# Patient Record
Sex: FEMALE | Race: WHITE | HISPANIC OR LATINO | Employment: FULL TIME | URBAN - METROPOLITAN AREA
[De-identification: names, ages, dates, MRNs, and addresses within clinical notes are randomized per-mention and may not be internally consistent; named-entity substitution may affect disease eponyms.]

---

## 2017-02-01 ENCOUNTER — OFFICE VISIT (OUTPATIENT)
Dept: URGENT CARE | Facility: CLINIC | Age: 57
End: 2017-02-01
Payer: COMMERCIAL

## 2017-02-01 VITALS
DIASTOLIC BLOOD PRESSURE: 80 MMHG | HEART RATE: 76 BPM | HEIGHT: 65 IN | OXYGEN SATURATION: 99 % | SYSTOLIC BLOOD PRESSURE: 120 MMHG | WEIGHT: 227.5 LBS | TEMPERATURE: 98 F | BODY MASS INDEX: 37.9 KG/M2 | RESPIRATION RATE: 18 BRPM

## 2017-02-01 DIAGNOSIS — R09.81 NASAL CONGESTION: ICD-10-CM

## 2017-02-01 DIAGNOSIS — J01.00 ACUTE NON-RECURRENT MAXILLARY SINUSITIS: Primary | ICD-10-CM

## 2017-02-01 PROCEDURE — 99999 PR PBB SHADOW E&M-EST. PATIENT-LVL III: CPT | Mod: PBBFAC,,, | Performed by: NURSE PRACTITIONER

## 2017-02-01 PROCEDURE — 99214 OFFICE O/P EST MOD 30 MIN: CPT | Mod: 25,S$GLB,, | Performed by: NURSE PRACTITIONER

## 2017-02-01 PROCEDURE — 96372 THER/PROPH/DIAG INJ SC/IM: CPT | Mod: S$GLB,,, | Performed by: NURSE PRACTITIONER

## 2017-02-01 RX ORDER — AMOXICILLIN 875 MG/1
875 TABLET, FILM COATED ORAL 2 TIMES DAILY
Qty: 20 TABLET | Refills: 0 | Status: SHIPPED | OUTPATIENT
Start: 2017-02-01 | End: 2017-02-11

## 2017-02-01 RX ORDER — LORATADINE 10 MG/1
10 TABLET ORAL DAILY
Qty: 30 TABLET | Refills: 0 | Status: SHIPPED | OUTPATIENT
Start: 2017-02-01 | End: 2017-04-18 | Stop reason: SDUPTHER

## 2017-02-01 RX ORDER — BETAMETHASONE SODIUM PHOSPHATE AND BETAMETHASONE ACETATE 3; 3 MG/ML; MG/ML
3 INJECTION, SUSPENSION INTRA-ARTICULAR; INTRALESIONAL; INTRAMUSCULAR; SOFT TISSUE
Status: COMPLETED | OUTPATIENT
Start: 2017-02-01 | End: 2017-02-01

## 2017-02-01 RX ADMIN — BETAMETHASONE SODIUM PHOSPHATE AND BETAMETHASONE ACETATE 3 MG: 3; 3 INJECTION, SUSPENSION INTRA-ARTICULAR; INTRALESIONAL; INTRAMUSCULAR; SOFT TISSUE at 05:02

## 2017-02-01 NOTE — PATIENT INSTRUCTIONS
Sinusitis (Antibiotic Treatment)    The sinuses are air-filled spaces within the bones of the face. They connect to the inside of the nose. Sinusitis is an inflammation of the tissue lining the sinus cavity. Sinus inflammation can occur during a cold. It can also be due to allergies to pollens and other particles in the air. Sinusitis can cause symptoms of sinus congestion and fullness. A sinus infection causes fever, headache and facial pain. There is often green or yellow drainage from the nose or into the back of the throat (post-nasal drip). You have been given antibiotics to treat this condition.  Home care:  · Take the full course of antibiotics as instructed. Do not stop taking them, even if you feel better.  · Drink plenty of water, hot tea, and other liquids. This may help thin mucus. It also may promote sinus drainage.  · Heat may help soothe painful areas of the face. Use a towel soaked in hot water. Or,  the shower and direct the hot spray onto your face. Using a vaporizer along with a menthol rub at night may also help.   · An expectorant containing guaifenesin may help thin the mucus and promote drainage from the sinuses.  · Over-the-counter decongestants may be used unless a similar medicine was prescribed. Nasal sprays work the fastest. Use one that contains phenylephrine or oxymetazoline. First blow the nose gently. Then use the spray. Do not use these medicines more often than directed on the label or symptoms may get worse. You may also use tablets containing pseudoephedrine. Avoid products that combine ingredients, because side effects may be increased. Read labels. You can also ask the pharmacist for help. (NOTE: Persons with high blood pressure should not use decongestants. They can raise blood pressure.)  · Over-the-counter antihistamines may help if allergies contributed to your sinusitis.    · Do not use nasal rinses or irrigation during an acute sinus infection, unless told to by  your health care provider. Rinsing may spread the infection to other sinuses.  · Use acetaminophen or ibuprofen to control pain, unless another pain medicine was prescribed. (If you have chronic liver or kidney disease or ever had a stomach ulcer, talk with your doctor before using these medicines. Aspirin should never be used in anyone under 18 years of age who is ill with a fever. It may cause severe liver damage.)  · Don't smoke. This can worsen symptoms.  Follow-up care  Follow up with your healthcare provider or our staff if you are not improving within the next week.  When to seek medical advice  Call your healthcare provider if any of these occur:  · Facial pain or headache becoming more severe  · Stiff neck  · Unusual drowsiness or confusion  · Swelling of the forehead or eyelids  · Vision problems, including blurred or double vision  · Fever of 100.4ºF (38ºC) or higher, or as directed by your healthcare provider  · Seizure  · Breathing problems  · Symptoms not resolving within 10 days  © 6559-2694 [employer] [address] [place] [address], [place] [address]. All rights reserved. This information is not intended as a substitute for professional medical care. Always follow your healthcare professional's instructions.

## 2017-02-01 NOTE — PROGRESS NOTES
Subjective:       Patient ID: Mary Patterson is a 57 y.o. female.    Chief Complaint: Sinusitis    HPI Comments: Pt is a 57 year old female to clinic today with complaints of nasal congestion, sinus pressure, and fever that began 3-4 days ago. No flu vacc this season.    Sinusitis   This is a new problem. The current episode started in the past 7 days (3-4 days ago). The problem has been gradually worsening since onset. The maximum temperature recorded prior to her arrival was 101 - 101.9 F (t max 101). She is experiencing no pain. Associated symptoms include chills, congestion, headaches, sinus pressure and sneezing. Pertinent negatives include no coughing, diaphoresis, ear pain, hoarse voice, neck pain, shortness of breath, sore throat or swollen glands. (PND  ) Treatments tried: kaya seltzer plus, flonase, alieve,  The treatment provided mild relief.     Review of Systems   Constitutional: Positive for chills and fever. Negative for diaphoresis and fatigue.   HENT: Positive for congestion, postnasal drip, sinus pressure and sneezing. Negative for ear discharge, ear pain, hoarse voice, rhinorrhea and sore throat.    Eyes: Negative for pain.   Respiratory: Negative for cough, chest tightness, shortness of breath and wheezing.    Cardiovascular: Negative for chest pain and palpitations.   Gastrointestinal: Negative for abdominal pain, diarrhea, nausea and vomiting.   Genitourinary: Negative for dysuria.   Musculoskeletal: Negative for back pain, myalgias and neck pain.   Skin: Negative for rash.   Neurological: Positive for headaches. Negative for dizziness and light-headedness.       Objective:      Physical Exam   Constitutional: She is oriented to person, place, and time. She appears well-developed and well-nourished. No distress.   HENT:   Head: Normocephalic.   Right Ear: Tympanic membrane, external ear and ear canal normal. No tenderness. Tympanic membrane is not bulging.   Left Ear: Tympanic membrane,  external ear and ear canal normal. No tenderness. Tympanic membrane is not bulging.   Nose: Mucosal edema present. No rhinorrhea. Right sinus exhibits maxillary sinus tenderness. Right sinus exhibits no frontal sinus tenderness. Left sinus exhibits maxillary sinus tenderness. Left sinus exhibits no frontal sinus tenderness.   Mouth/Throat: Uvula is midline, oropharynx is clear and moist and mucous membranes are normal. No oropharyngeal exudate, posterior oropharyngeal edema or posterior oropharyngeal erythema.   Eyes: Conjunctivae and EOM are normal. Pupils are equal, round, and reactive to light.   Neck: Normal range of motion. Neck supple.   Cardiovascular: Normal rate, regular rhythm, S1 normal, S2 normal and normal heart sounds.  Exam reveals no gallop and no friction rub.    No murmur heard.  Pulmonary/Chest: Effort normal and breath sounds normal. No accessory muscle usage. No apnea, no tachypnea and no bradypnea. No respiratory distress. She has no decreased breath sounds. She has no wheezes. She has no rhonchi. She has no rales.   Abdominal: Bowel sounds are normal.   Musculoskeletal: Normal range of motion.   Lymphadenopathy:        Head (right side): No submental, no submandibular and no tonsillar adenopathy present.        Head (left side): No submental, no submandibular and no tonsillar adenopathy present.     She has no cervical adenopathy.   Neurological: She is alert and oriented to person, place, and time.   Skin: Skin is warm and dry. No rash noted. She is not diaphoretic.   Psychiatric: She has a normal mood and affect. Her speech is normal and behavior is normal.   Nursing note and vitals reviewed.      Assessment:       1. Acute non-recurrent maxillary sinusitis    2. Nasal congestion        Plan:   Acute non-recurrent maxillary sinusitis  -     amoxicillin (AMOXIL) 875 MG tablet; Take 1 tablet (875 mg total) by mouth 2 (two) times daily.  Dispense: 20 tablet; Refill: 0  -     betamethasone  acetate-betamethasone sodium phosphate injection 3 mg; Inject 0.5 mLs (3 mg total) into the muscle one time.    Nasal congestion  -     loratadine (CLARITIN) 10 mg tablet; Take 1 tablet (10 mg total) by mouth once daily.  Dispense: 30 tablet; Refill: 0      Pt refused flu swab at this time.    Patient informed of potential side effects of steroid injection including elevating blood pressure, increased blood glucose levels, increased risk of opportunistic infections, peptic ulcer disease and GI bleeding, insomnia, tremors, suppression of ones own steroid production, avascular necrosis, osteoporosis, increased intraocular pressure, etc. Patient verbalizes risk/benefit profile and agrees to steroid injection.     · Rest and increase fluids.   · May apply warm compresses as needed.   · Saline nasal spray or saline irrigation (Neti pot) to loosen nasal congestion.  · Flonase or Nasacort to reduce inflammation in the sinus cavities.  · Take antibiotics exactly as prescribed. Make sure to complete the entire course of antibiotics even if you start feeling better. This will prevent recurrence of your infection and bacterial resistance.   · Do not drive, drink alcohol, or take any other sedating medications or substances while taking cough syrup.   · Follow up with your primary care provider or with ENT if not improved within a few days or sooner for any new or worsening symptoms.   · Go to the ER for any fever that does not improve with Tylenol/Ibuprofen, neck stiffness, rash, severe headache, vision changes, shortness of breath, chest pain, severe facial pain or swelling, or for any other new and concerning symptoms.

## 2017-02-01 NOTE — MR AVS SNAPSHOT
[facility]  [address] [address]  Phone: [phone]  Fax: [phone]                  Mary Patterson   2017 4:50 PM   Office Visit    Description:  Female : 1960   Provider:  Drew Kern NP   Department:  [facility]           Reason for Visit     Sinusitis           Diagnoses this Visit        Comments    Acute non-recurrent maxillary sinusitis    -  Primary     Nasal congestion                To Do List           Future Appointments        Provider Department Dept Phone    2017 4:00 PM DEE Pablo OD [facility] - Ophthalmology [phone]      Goals (5 Years of Data)     None      Follow-Up and Disposition     Return if symptoms worsen or fail to improve.    Follow-up and Disposition History       These Medications        Disp Refills Start End    amoxicillin (AMOXIL) 875 MG tablet 20 tablet 0 2017    Take 1 tablet (875 mg total) by mouth 2 (two) times daily. - Oral    [facility]acy[facility] [address] Ph #: [phone]       loratadine (CLARITIN) 10 mg tablet 30 tablet 0 2017 3/3/2017    Take 1 tablet (10 mg total) by mouth once daily. - Oral    Pharmacy: [facility] [address] Ph #: [phone]         [facility] On [facility]     [facility] On Call Nurse Care Line -  Assistance  Registered nurses in the [facility] provide clinical advisement, health education, appointment booking, and other advisory services.  Call for this free service at [phone].             Medications           Message regarding Medications     Verify the changes and/or additions to your medication regime listed below are the same as discussed with your clinician today.  If any of these changes or additions are incorrect, please notify your healthcare provider.        START taking these NEW  medications        Refills    amoxicillin (AMOXIL) 875 MG tablet 0    Sig: Take 1 tablet (875 mg total) by mouth 2 (two) times daily.    Class: Normal    Route: Oral    loratadine (CLARITIN) 10 mg tablet 0    Sig: Take 1 tablet (10 mg total) by mouth once daily.    Class: Normal    Route: Oral      These medications were administered today        Dose Freq    betamethasone acetate-betamethasone sodium phosphate injection 3 mg 3 mg Clinic/HOD 1 time    Sig: Inject 0.5 mLs (3 mg total) into the muscle one time.    Class: Normal    Route: Intramuscular           Verify that the below list of medications is an accurate representation of the medications you are currently taking.  If none reported, the list may be blank. If incorrect, please contact your healthcare provider. Carry this list with you in case of emergency.           Current Medications     blood sugar diagnostic (ACCU-CHEK SHARIF PLUS TEST STRP) Strp 1 strip by Misc.(Non-Drug; Combo Route) route 2 (two) times daily. Accu-chek Sharif Plus Strips    lancets (ACCU-CHEK SOFTCLIX LANCETS) Misc 1 lancet by Misc.(Non-Drug; Combo Route) route 2 (two) times daily.    levothyroxine (SYNTHROID) 25 MCG tablet Take 1 tablet (25 mcg total) by mouth before breakfast.    levothyroxine (SYNTHROID, LEVOTHROID) 175 MCG tablet Take 1 tablet (175 mcg total) by mouth once daily.    SYNTHROID 200 mcg tablet Take 1 tablet (200 mcg total) by mouth every morning.    amoxicillin (AMOXIL) 875 MG tablet Take 1 tablet (875 mg total) by mouth 2 (two) times daily.    cetirizine (ZYRTEC) 10 MG tablet Take 1 tablet (10 mg total) by mouth once daily.    loratadine (CLARITIN) 10 mg tablet Take 1 tablet (10 mg total) by mouth once daily.           Clinical Reference Information           Vital Signs - Last Recorded  Most recent update: 2/1/2017  5:20 PM by Telma Guo LPN    BP Pulse Temp Resp Ht Wt    120/80 (BP Location: Right arm, Patient Position: Sitting, BP Method: Manual) 76 98 °F  "(36.7 °C) (Oral) 18 5' 5" (1.651 m) 103.2 kg (227 lb 8.2 oz)    SpO2 BMI             99% 37.86 kg/m2         Blood Pressure          Most Recent Value    BP  120/80      Allergies as of 2/1/2017     No Known Drug Allergies      Immunizations Administered on Date of Encounter - 2/1/2017     None      Administrations This Visit     betamethasone acetate-betamethasone sodium phosphate injection 3 mg     Admin Date Action Dose Route Administered By             02/01/2017 Given 3 mg Intramuscular Telma Guo LPN                      Instructions      Sinusitis (Antibiotic Treatment)    The sinuses are air-filled spaces within the bones of the face. They connect to the inside of the nose. Sinusitis is an inflammation of the tissue lining the sinus cavity. Sinus inflammation can occur during a cold. It can also be due to allergies to pollens and other particles in the air. Sinusitis can cause symptoms of sinus congestion and fullness. A sinus infection causes fever, headache and facial pain. There is often green or yellow drainage from the nose or into the back of the throat (post-nasal drip). You have been given antibiotics to treat this condition.  Home care:  · Take the full course of antibiotics as instructed. Do not stop taking them, even if you feel better.  · Drink plenty of water, hot tea, and other liquids. This may help thin mucus. It also may promote sinus drainage.  · Heat may help soothe painful areas of the face. Use a towel soaked in hot water. Or,  the shower and direct the hot spray onto your face. Using a vaporizer along with a menthol rub at night may also help.   · An expectorant containing guaifenesin may help thin the mucus and promote drainage from the sinuses.  · Over-the-counter decongestants may be used unless a similar medicine was prescribed. Nasal sprays work the fastest. Use one that contains phenylephrine or oxymetazoline. First blow the nose gently. Then use the spray. Do not " use these medicines more often than directed on the label or symptoms may get worse. You may also use tablets containing pseudoephedrine. Avoid products that combine ingredients, because side effects may be increased. Read labels. You can also ask the pharmacist for help. (NOTE: Persons with high blood pressure should not use decongestants. They can raise blood pressure.)  · Over-the-counter antihistamines may help if allergies contributed to your sinusitis.    · Do not use nasal rinses or irrigation during an acute sinus infection, unless told to by your health care provider. Rinsing may spread the infection to other sinuses.  · Use acetaminophen or ibuprofen to control pain, unless another pain medicine was prescribed. (If you have chronic liver or kidney disease or ever had a stomach ulcer, talk with your doctor before using these medicines. Aspirin should never be used in anyone under 18 years of age who is ill with a fever. It may cause severe liver damage.)  · Don't smoke. This can worsen symptoms.  Follow-up care  Follow up with your healthcare provider or our staff if you are not improving within the next week.  When to seek medical advice  Call your healthcare provider if any of these occur:  · Facial pain or headache becoming more severe  · Stiff neck  · Unusual drowsiness or confusion  · Swelling of the forehead or eyelids  · Vision problems, including blurred or double vision  · Fever of 100.4ºF (38ºC) or higher, or as directed by your healthcare provider  · Seizure  · Breathing problems  · Symptoms not resolving within 10 days  © 2460-7176 [employer]. [address] [place] [address], [place] [address]. All rights reserved. This information is not intended as a substitute for professional medical care. Always follow your healthcare professional's instructions.

## 2017-02-03 DIAGNOSIS — E11.9 TYPE 2 DIABETES MELLITUS WITHOUT COMPLICATION: ICD-10-CM

## 2017-02-21 ENCOUNTER — OFFICE VISIT (OUTPATIENT)
Dept: URGENT CARE | Facility: CLINIC | Age: 57
End: 2017-02-21
Payer: COMMERCIAL

## 2017-02-21 VITALS
WEIGHT: 223.56 LBS | BODY MASS INDEX: 37.2 KG/M2 | HEART RATE: 98 BPM | TEMPERATURE: 101 F | SYSTOLIC BLOOD PRESSURE: 130 MMHG | OXYGEN SATURATION: 97 % | DIASTOLIC BLOOD PRESSURE: 80 MMHG

## 2017-02-21 DIAGNOSIS — J10.1 INFLUENZA A: Primary | ICD-10-CM

## 2017-02-21 DIAGNOSIS — M79.10 MYALGIA: ICD-10-CM

## 2017-02-21 DIAGNOSIS — R09.81 NASAL CONGESTION: ICD-10-CM

## 2017-02-21 DIAGNOSIS — R50.9 FEVER, UNSPECIFIED FEVER CAUSE: ICD-10-CM

## 2017-02-21 LAB
CTP QC/QA: YES
FLUAV AG NPH QL: POSITIVE
FLUBV AG NPH QL: NEGATIVE

## 2017-02-21 PROCEDURE — 99999 PR PBB SHADOW E&M-EST. PATIENT-LVL III: CPT | Mod: PBBFAC,,, | Performed by: NURSE PRACTITIONER

## 2017-02-21 PROCEDURE — 1160F RVW MEDS BY RX/DR IN RCRD: CPT | Mod: S$GLB,,, | Performed by: NURSE PRACTITIONER

## 2017-02-21 PROCEDURE — 99214 OFFICE O/P EST MOD 30 MIN: CPT | Mod: 25,S$GLB,, | Performed by: NURSE PRACTITIONER

## 2017-02-21 PROCEDURE — 87804 INFLUENZA ASSAY W/OPTIC: CPT | Mod: QW,S$GLB,, | Performed by: NURSE PRACTITIONER

## 2017-02-21 RX ORDER — OSELTAMIVIR PHOSPHATE 75 MG/1
75 CAPSULE ORAL 2 TIMES DAILY
Qty: 10 CAPSULE | Refills: 0 | Status: SHIPPED | OUTPATIENT
Start: 2017-02-21 | End: 2017-02-26

## 2017-02-21 NOTE — PATIENT INSTRUCTIONS
Influenza (Adult)    Influenza is also called the flu. It is a viral illness that affects the air passages of your lungs. It is different from the common cold. The flu can easily be passed from one to person to another. It may be spread through the air by coughing and sneezing. Or it can be spread by touching the sick person and then touching your own eyes, nose, or mouth.  The flu starts 1 to 3 days after you are exposed to the flu virus. It may last for 1 to 2 weeks. You usually dont need to take antibiotics unless you have a complication. This might be an ear or sinus infection or pneumonia.  Symptoms of the flu may be mild or severe. They can include extreme tiredness (wanting to stay in bed all day), chills, fevers, muscle aches, soreness with eye movement, headache, and a dry, hacking cough.  Home care  Follow these guidelines when caring for yourself at home:  · Avoid being around cigarette smoke, whether yours or other peoples.  · Acetaminophen or ibuprofen will help ease your fever, muscle aches, and headache. Dont give aspirin to anyone younger than 18 who has the flu. Aspirin can harm the liver.  · Nausea and loss of appetite are common with the flu. Eat light meals. Drink 6 to 8 glasses of liquids every day. Good choices are water, sport drinks, soft drinks without caffeine, juices, tea, and soup. Extra fluids will also help loosen secretions in your nose and lungs.  · Over-the-counter cold medicines will not make the flu go away faster. But the medicines may help with coughing, sore throat, and congestion in your nose and sinuses. Dont use a decongestant if you have high blood pressure.  · Stay home until your fever has been gone for at least 24 hours without using medicine to reduce fever.  Follow-up care  Follow up with your healthcare provider, or as advised, if you are not getting better over the next week.  If you are 65 or older, talk with your provider about getting a pneumococcal vaccine  every 5 years. You should also get this vaccine if you have chronic asthma or COPD. All adults should get a flu vaccine every fall. Ask your provider about this.  When to seek medical advice  Call your healthcare provider right away if any of these occur:  · Cough with lots of colored sputum (mucus) or blood in your sputum  · Chest pain, shortness of breath, wheezing, or difficulty breathing  · Severe headache, or face, neck, or ear pain  · New rash with fever  · Fever of 100.4°F (38°C) or higher, or as directed by your healthcare provider  · Confusion, behavior change, or seizure  · Severe weakness or dizziness  · You get a fever or cough after getting better for a few days  Date Last Reviewed: 12/23/2014  © 6517-1182 [employer]. [address] [place] [address], [place] [address]. All rights reserved. This information is not intended as a substitute for professional medical care. Always follow your healthcare professional's instructions.

## 2017-02-21 NOTE — LETTER
February 21, 2017      [facility]  [address] [place] [address]  Phone: [phone]  Fax: [phone]       Patient: Mary Patterson   YOB: 1960  Date of Visit: 02/21/2017    To Whom It May Concern:    Mary was at [facility] on 02/21/2017. She may return to work/school on 02/27/2017 with out restrictions. If you have any questions or concerns, or if I can be of further assistance, please do not hesitate to contact me.    Sincerely,    Devora Larios, NP

## 2017-02-21 NOTE — PROGRESS NOTES
Subjective:       Patient ID: Mary Patterson is a 57 y.o. female.    Chief Complaint: Fever    HPI Comments: 56 yo female presents to Urgent Care with reports of fever and body aches for 2 days.    Fever    This is a new problem. The current episode started yesterday. The problem occurs constantly. The problem has been unchanged. The maximum temperature noted was 100 to 100.9 F. The temperature was taken using an oral thermometer. Associated symptoms include congestion, coughing and muscle aches. Pertinent negatives include no abdominal pain, chest pain, diarrhea, ear pain, headaches, nausea, rash, sleepiness, sore throat, urinary pain, vomiting or wheezing. She has tried nothing for the symptoms. The treatment provided no relief.   Risk factors: sick contacts    Risk factors: no contaminated food, no contaminated water, no hx of cancer, no immunosuppression, no occupational exposure, no recent sickness and no recent travel      Review of Systems   Constitutional: Positive for fever. Negative for activity change and fatigue.   HENT: Positive for congestion. Negative for ear pain, rhinorrhea, sinus pressure, sore throat and trouble swallowing.    Eyes: Negative for pain, discharge and visual disturbance.   Respiratory: Positive for cough. Negative for shortness of breath and wheezing.    Cardiovascular: Negative for chest pain, palpitations and leg swelling.   Gastrointestinal: Negative for abdominal pain, constipation, diarrhea, nausea and vomiting.   Endocrine: Negative for cold intolerance.   Genitourinary: Negative for difficulty urinating, dysuria, flank pain, frequency and genital sores.   Musculoskeletal: Negative for arthralgias, back pain and gait problem.   Skin: Negative for rash and wound.   Allergic/Immunologic: Negative for environmental allergies and food allergies.   Neurological: Negative for dizziness, light-headedness, numbness and headaches.   Hematological: Negative for adenopathy.    Psychiatric/Behavioral: Negative for behavioral problems.   All other systems reviewed and are negative.      Objective:      Physical Exam   Constitutional: She is oriented to person, place, and time. She appears well-developed and well-nourished.   HENT:   Head: Normocephalic.   Nose: Mucosal edema and rhinorrhea present.   Mouth/Throat: Uvula is midline and mucous membranes are normal. Posterior oropharyngeal erythema present.   Cardiovascular: Normal rate and normal heart sounds.    Pulmonary/Chest: Effort normal and breath sounds normal.   Non productive cough   Neurological: She is alert and oriented to person, place, and time.   Skin: Skin is warm and dry.   Psychiatric: She has a normal mood and affect.   Nursing note and vitals reviewed.      Assessment:       1. Influenza A    2. Fever, unspecified fever cause    3. Myalgia    4. Nasal congestion        Plan:         Mary was seen today for fever.    Diagnoses and all orders for this visit:    Influenza A  -     oseltamivir (TAMIFLU) 75 MG capsule; Take 1 capsule (75 mg total) by mouth 2 (two) times daily.    Fever, unspecified fever cause  Comments:  Tylenol OTC      Myalgia  Comments:  Tylenol OTC    Orders:  -     POCT Influenza A/B    Nasal congestion  -     phenylephrine-DM-guaifenesin (DECONEX DMX) 10- mg Tab; Take 1 tablet by mouth every 12 (twelve) hours as needed.    Follow prescribed treatment plan as directed.  Stay hydrated and rest.  Report to ER if symptoms worsen.  Follow up with PCP in 2-3 days or sooner if symptoms do not improve.

## 2017-02-21 NOTE — MR AVS SNAPSHOT
[facility]  [address] [address]  Phone: [phone]  Fax: [phone]                  Mary Patterson   2017 11:00 AM   Office Visit    Description:  Female : 1960   Provider:  Devora Larios NP   Department:  [facility]           Reason for Visit     Fever           Diagnoses this Visit        Comments    Influenza A    -  Primary     Fever, unspecified fever cause     Tylenol OTC      Myalgia     Tylenol OTC      Nasal congestion                To Do List           Goals (5 Years of Data)     None      Follow-Up and Disposition     Return if symptoms worsen or fail to improve.       These Medications        Disp Refills Start End    oseltamivir (TAMIFLU) 75 MG capsule 10 capsule 0 2017    Take 1 capsule (75 mg total) by mouth 2 (two) times daily. - Oral    [facility] [address] Ph #: [phone]       phenylephrine-DM-guaifenesin (DECONEX DMX) 10- mg Tab 20 each 0 2017 3/3/2017    Take 1 tablet by mouth every 12 (twelve) hours as needed. - Oral    [facility] [address] Ph #: [phone]         [facility] On Call     [facility] On Call Nurse Care Line -  Assistance  Registered nurses in the [facility] provide clinical advisement, health education, appointment booking, and other advisory services.  Call for this free service at [phone].             Medications           Message regarding Medications     Verify the changes and/or additions to your medication regime listed below are the same as discussed with your clinician today.  If any of these changes or additions are incorrect, please notify your healthcare provider.        START taking these NEW medications        Refills    oseltamivir (TAMIFLU) 75 MG capsule 0    Sig: Take 1 capsule (75 mg  total) by mouth 2 (two) times daily.    Class: Normal    Route: Oral    phenylephrine-DM-guaifenesin (DECONEX DMX) 10- mg Tab 0    Sig: Take 1 tablet by mouth every 12 (twelve) hours as needed.    Class: Normal    Route: Oral           Verify that the below list of medications is an accurate representation of the medications you are currently taking.  If none reported, the list may be blank. If incorrect, please contact your healthcare provider. Carry this list with you in case of emergency.           Current Medications     blood sugar diagnostic (ACCU-CHEK SHARIF PLUS TEST STRP) Strp 1 strip by Misc.(Non-Drug; Combo Route) route 2 (two) times daily. Accu-chek Sharif Plus Strips    cetirizine (ZYRTEC) 10 MG tablet Take 1 tablet (10 mg total) by mouth once daily.    lancets (ACCU-CHEK SOFTCLIX LANCETS) Misc 1 lancet by Misc.(Non-Drug; Combo Route) route 2 (two) times daily.    levothyroxine (SYNTHROID) 25 MCG tablet Take 1 tablet (25 mcg total) by mouth before breakfast.    levothyroxine (SYNTHROID, LEVOTHROID) 175 MCG tablet Take 1 tablet (175 mcg total) by mouth once daily.    loratadine (CLARITIN) 10 mg tablet Take 1 tablet (10 mg total) by mouth once daily.    oseltamivir (TAMIFLU) 75 MG capsule Take 1 capsule (75 mg total) by mouth 2 (two) times daily.    phenylephrine-DM-guaifenesin (DECONEX DMX) 10- mg Tab Take 1 tablet by mouth every 12 (twelve) hours as needed.    SYNTHROID 200 mcg tablet Take 1 tablet (200 mcg total) by mouth every morning.           Clinical Reference Information           Your Vitals Were     BP Pulse Temp    130/80 (BP Location: Left arm, Patient Position: Sitting, BP Method: Manual) 98 100.8 °F (38.2 °C) (Tympanic)    Weight SpO2 BMI    101.4 kg (223 lb 8.7 oz) 97% 37.2 kg/m2      Blood Pressure          Most Recent Value    BP  130/80      Allergies as of 2/21/2017     No Known Drug Allergies      Immunizations Administered on Date of Encounter - 2/21/2017     None       Orders Placed During Today's Visit      Normal Orders This Visit    POCT Influenza A/B       Instructions      Influenza (Adult)    Influenza is also called the flu. It is a viral illness that affects the air passages of your lungs. It is different from the common cold. The flu can easily be passed from one to person to another. It may be spread through the air by coughing and sneezing. Or it can be spread by touching the sick person and then touching your own eyes, nose, or mouth.  The flu starts 1 to 3 days after you are exposed to the flu virus. It may last for 1 to 2 weeks. You usually dont need to take antibiotics unless you have a complication. This might be an ear or sinus infection or pneumonia.  Symptoms of the flu may be mild or severe. They can include extreme tiredness (wanting to stay in bed all day), chills, fevers, muscle aches, soreness with eye movement, headache, and a dry, hacking cough.  Home care  Follow these guidelines when caring for yourself at home:  · Avoid being around cigarette smoke, whether yours or other peoples.  · Acetaminophen or ibuprofen will help ease your fever, muscle aches, and headache. Dont give aspirin to anyone younger than 18 who has the flu. Aspirin can harm the liver.  · Nausea and loss of appetite are common with the flu. Eat light meals. Drink 6 to 8 glasses of liquids every day. Good choices are water, sport drinks, soft drinks without caffeine, juices, tea, and soup. Extra fluids will also help loosen secretions in your nose and lungs.  · Over-the-counter cold medicines will not make the flu go away faster. But the medicines may help with coughing, sore throat, and congestion in your nose and sinuses. Dont use a decongestant if you have high blood pressure.  · Stay home until your fever has been gone for at least 24 hours without using medicine to reduce fever.  Follow-up care  Follow up with your healthcare provider, or as advised, if you are not getting  better over the next week.  If you are 65 or older, talk with your provider about getting a pneumococcal vaccine every 5 years. You should also get this vaccine if you have chronic asthma or COPD. All adults should get a flu vaccine every fall. Ask your provider about this.  When to seek medical advice  Call your healthcare provider right away if any of these occur:  · Cough with lots of colored sputum (mucus) or blood in your sputum  · Chest pain, shortness of breath, wheezing, or difficulty breathing  · Severe headache, or face, neck, or ear pain  · New rash with fever  · Fever of 100.4°F (38°C) or higher, or as directed by your healthcare provider  · Confusion, behavior change, or seizure  · Severe weakness or dizziness  · You get a fever or cough after getting better for a few days  Date Last Reviewed: 12/23/2014  © 4764-5627 [employer] [address] [place] [address]. All rights reserved. This information is not intended as a substitute for professional medical care. Always follow your healthcare professional's instructions.             Language Assistance Services     ATTENTION: Language assistance services are available, free of charge. Please call [phone].      ATENCIÓN: Si habla español, tiene a kearns disposición servicios gratuitos de asistencia lingüística. Llame al [phone].     JACKIE Ý: N?u b?n nói Ti?ng Vi?t, có các d?ch v? h? tr? ngôn ng? mi?n phí dành cho b?n. G?i s? [phone].         [facility] complies with applicable Federal civil rights laws and does not discriminate on the basis of race, color, national origin, age, disability, or sex.

## 2017-04-18 ENCOUNTER — HOSPITAL ENCOUNTER (OUTPATIENT)
Dept: RADIOLOGY | Facility: HOSPITAL | Age: 57
Discharge: HOME | End: 2017-04-18
Attending: NURSE PRACTITIONER
Payer: COMMERCIAL

## 2017-04-18 ENCOUNTER — OFFICE VISIT (OUTPATIENT)
Dept: URGENT CARE | Facility: CLINIC | Age: 57
End: 2017-04-18
Payer: COMMERCIAL

## 2017-04-18 VITALS
WEIGHT: 227.94 LBS | DIASTOLIC BLOOD PRESSURE: 88 MMHG | HEART RATE: 64 BPM | SYSTOLIC BLOOD PRESSURE: 132 MMHG | OXYGEN SATURATION: 98 % | BODY MASS INDEX: 36.63 KG/M2 | HEIGHT: 66 IN | TEMPERATURE: 98 F

## 2017-04-18 DIAGNOSIS — R09.81 NASAL CONGESTION: ICD-10-CM

## 2017-04-18 DIAGNOSIS — J01.00 ACUTE NON-RECURRENT MAXILLARY SINUSITIS: Primary | ICD-10-CM

## 2017-04-18 DIAGNOSIS — B37.9 ANTIBIOTIC-INDUCED YEAST INFECTION: ICD-10-CM

## 2017-04-18 DIAGNOSIS — R51.9 FACIAL PAIN: ICD-10-CM

## 2017-04-18 DIAGNOSIS — T36.95XA ANTIBIOTIC-INDUCED YEAST INFECTION: ICD-10-CM

## 2017-04-18 PROCEDURE — 99999 PR PBB SHADOW E&M-EST. PATIENT-LVL IV: CPT | Mod: PBBFAC,,, | Performed by: NURSE PRACTITIONER

## 2017-04-18 PROCEDURE — 99214 OFFICE O/P EST MOD 30 MIN: CPT | Mod: S$GLB,,, | Performed by: NURSE PRACTITIONER

## 2017-04-18 PROCEDURE — 1160F RVW MEDS BY RX/DR IN RCRD: CPT | Mod: S$GLB,,, | Performed by: NURSE PRACTITIONER

## 2017-04-18 PROCEDURE — 70220 X-RAY EXAM OF SINUSES: CPT | Mod: TC,PO

## 2017-04-18 PROCEDURE — 70220 X-RAY EXAM OF SINUSES: CPT | Mod: 26,,, | Performed by: RADIOLOGY

## 2017-04-18 RX ORDER — LORATADINE 10 MG/1
10 TABLET ORAL DAILY
Qty: 30 TABLET | Refills: 0
Start: 2017-04-18 | End: 2017-09-12 | Stop reason: ALTCHOICE

## 2017-04-18 RX ORDER — AMOXICILLIN AND CLAVULANATE POTASSIUM 875; 125 MG/1; MG/1
1 TABLET, FILM COATED ORAL 2 TIMES DAILY
Qty: 20 TABLET | Refills: 0 | Status: SHIPPED | OUTPATIENT
Start: 2017-04-18 | End: 2017-04-28

## 2017-04-18 RX ORDER — FLUCONAZOLE 150 MG/1
150 TABLET ORAL ONCE
Qty: 2 TABLET | Refills: 0 | Status: SHIPPED | OUTPATIENT
Start: 2017-04-18 | End: 2017-04-18

## 2017-04-18 NOTE — MR AVS SNAPSHOT
[facility]  [address] [address]  Phone: [phone]  Fax: [phone]                  Mary Patterson   2017 11:10 AM   Office Visit    Description:  Female : 1960   Provider:  HOLGER Rondon   Department:  [place] [facility]           Reason for Visit     Facial Pain           Diagnoses this Visit        Comments    Acute non-recurrent maxillary sinusitis    -  Primary     Facial pain         Nasal congestion                To Do List           Goals (5 Years of Data)     None      Follow-Up and Disposition     Return if symptoms worsen or fail to improve.       These Medications        Disp Refills Start End    amoxicillin-clavulanate 875-125mg (AUGMENTIN) 875-125 mg per tablet 20 tablet 0 2017    Take 1 tablet by mouth 2 (two) times daily. - Oral    Pharmacy: [facility] [address] [place] [address] Ph #: [phone]       loratadine (CLARITIN) 10 mg tablet 30 tablet 0 2017    Take 1 tablet (10 mg total) by mouth once daily. - Oral    Pharmacy: [facility] [address] [place] [address] Ph #: [phone]         [facility] On Call     [facility] Nurse Care Line - 24 Assistance  Unless otherwise directed by your provider, please contact [facility], our nurse care line that is available for / assistance.     Registered nurses in the [facility] provide: appointment scheduling, clinical advisement, health education, and other advisory services.  Call: [phone] (toll free)               Medications           Message regarding Medications     Verify the changes and/or additions to your medication regime listed below are the same as discussed with your clinician today.  If any of these changes or additions are incorrect, please notify your healthcare provider.        START taking these NEW medications        Refills     "amoxicillin-clavulanate 875-125mg (AUGMENTIN) 875-125 mg per tablet 0    Sig: Take 1 tablet by mouth 2 (two) times daily.    Class: Normal    Route: Oral           Verify that the below list of medications is an accurate representation of the medications you are currently taking.  If none reported, the list may be blank. If incorrect, please contact your healthcare provider. Carry this list with you in case of emergency.           Current Medications     amoxicillin-clavulanate 875-125mg (AUGMENTIN) 875-125 mg per tablet Take 1 tablet by mouth 2 (two) times daily.    blood sugar diagnostic (ACCU-CHEK SHARIF PLUS TEST STRP) Strp 1 strip by Misc.(Non-Drug; Combo Route) route 2 (two) times daily. Accu-chek Sharif Plus Strips    cetirizine (ZYRTEC) 10 MG tablet Take 1 tablet (10 mg total) by mouth once daily.    lancets (ACCU-CHEK SOFTCLIX LANCETS) Misc 1 lancet by Misc.(Non-Drug; Combo Route) route 2 (two) times daily.    levothyroxine (SYNTHROID) 25 MCG tablet Take 1 tablet (25 mcg total) by mouth before breakfast.    levothyroxine (SYNTHROID, LEVOTHROID) 175 MCG tablet Take 1 tablet (175 mcg total) by mouth once daily.    loratadine (CLARITIN) 10 mg tablet Take 1 tablet (10 mg total) by mouth once daily.    SYNTHROID 200 mcg tablet Take 1 tablet (200 mcg total) by mouth every morning.           Clinical Reference Information           Your Vitals Were     BP Pulse Temp Height    132/88 (BP Location: Left arm, Patient Position: Sitting, BP Method: Manual) 64 98 °F (36.7 °C) (Tympanic) 5' 5.5" (1.664 m)    Weight SpO2 BMI    103.4 kg (227 lb 15.3 oz) 98% 37.36 kg/m2      Blood Pressure          Most Recent Value    BP  132/88      Allergies as of 4/18/2017     No Known Drug Allergies      Immunizations Administered on Date of Encounter - 4/18/2017     None      Orders Placed During Today's Visit     Future Labs/Procedures Expected by Expires    X-Ray Sinuses Min 3 Views  4/18/2017 4/18/2018      Instructions  "     Understanding Sinus Problems    You dont often think about your sinuses until theres a problem. One day you realize you cant smell dinner cooking. Or you find you often have headaches or problems breathing through your nose.  Symptoms of sinus problems  Sinus problems can cause uncomfortable symptoms. Your nose may run constantly. You might have trouble sleeping at night. You may even lose your sense of smell. Other symptoms can include:  · Nasal congestion  · Fullness in ears  · Green, yellow, or bloody drainage from the nose  · Trouble tasting food  · Frequent headaches  · Facial pain  · Cough  When sinuses are blocked  If something blocks the passages in the nose or sinuses, mucus cant drain. Mucus-filled sinuses often become infected.  · Colds cause the lining of the nose and sinuses to swell and make extra mucus. A buildup of mucus can lead to a more serious infection.  · Allergies irritate turbinates and other tissues. This causes swelling, which can cause a blockage. Over time, this irritation can also lead to sacs of swollen tissue (polyps).  · Polyps may form in both the sinuses and nose. Polyps can grow large enough to clog nasal passages and block drainage.  · A crooked (deviated) septum may block nasal passages. This is often the result of an injury.  Date Last Reviewed: 11/1/2016  © 9149-4885 [employer]. [address] [place] [address], [place] [address]. All rights reserved. This information is not intended as a substitute for professional medical care. Always follow your healthcare professional's instructions.    Follow prescribed treatment plan as directed.  Stay hydrated and rest.  Report to ER if symptoms worsen.  Follow up with PCP in 2-3 days or sooner if symptoms do not improve.             Language Assistance Services     ATTENTION: Language assistance services are available, free of charge. Please call [phone].      ATENCIÓN: Si silvia lorenzo a kearns disposición  servicios gratuitos de asistencia lingüística. Chris schwartz [phone].     [facility] Ý: N?u b?n nói Ti?ng Vi?t, có các d?ch v? h? tr? ngôn ng? mi?n phí dành cho b?n. G?i s? [phone].         [facility] complies with applicable Federal civil rights laws and does not discriminate on the basis of race, color, national origin, age, disability, or sex.

## 2017-04-18 NOTE — PATIENT INSTRUCTIONS
Understanding Sinus Problems    You dont often think about your sinuses until theres a problem. One day you realize you cant smell dinner cooking. Or you find you often have headaches or problems breathing through your nose.  Symptoms of sinus problems  Sinus problems can cause uncomfortable symptoms. Your nose may run constantly. You might have trouble sleeping at night. You may even lose your sense of smell. Other symptoms can include:  · Nasal congestion  · Fullness in ears  · Green, yellow, or bloody drainage from the nose  · Trouble tasting food  · Frequent headaches  · Facial pain  · Cough  When sinuses are blocked  If something blocks the passages in the nose or sinuses, mucus cant drain. Mucus-filled sinuses often become infected.  · Colds cause the lining of the nose and sinuses to swell and make extra mucus. A buildup of mucus can lead to a more serious infection.  · Allergies irritate turbinates and other tissues. This causes swelling, which can cause a blockage. Over time, this irritation can also lead to sacs of swollen tissue (polyps).  · Polyps may form in both the sinuses and nose. Polyps can grow large enough to clog nasal passages and block drainage.  · A crooked (deviated) septum may block nasal passages. This is often the result of an injury.  Date Last Reviewed: 11/1/2016  © 0855-5916 [employer]. [address] [place] [address], [place] [address]. All rights reserved. This information is not intended as a substitute for professional medical care. Always follow your healthcare professional's instructions.    Follow prescribed treatment plan as directed.  Stay hydrated and rest.  Report to ER if symptoms worsen.  Follow up with PCP in 2-3 days or sooner if symptoms do not improve.

## 2017-04-18 NOTE — PROGRESS NOTES
Subjective:       Patient ID: Mary Patterson is a 57 y.o. female.    Chief Complaint: Facial Pain    Facial Pain   This is a new problem. The current episode started in the past 7 days. The problem has been unchanged. Associated symptoms include congestion. Pertinent negatives include no abdominal pain, chest pain, chills, fever, nausea, numbness, rash, sore throat, vomiting or weakness. Associated symptoms comments: Sinus pressure Maxillary with pain that radiates behind ear. . Nothing aggravates the symptoms. She has tried nothing for the symptoms.     Review of Systems   Constitutional: Negative for appetite change, chills and fever.   HENT: Positive for congestion, ear pain and sinus pressure. Negative for dental problem, ear discharge, sore throat and trouble swallowing.    Eyes: Negative for visual disturbance.   Respiratory: Negative for shortness of breath.    Cardiovascular: Negative for chest pain.   Gastrointestinal: Negative for abdominal pain, diarrhea, nausea and vomiting.   Endocrine: Negative for cold intolerance, polyphagia and polyuria.   Genitourinary: Negative for decreased urine volume and dysuria.   Musculoskeletal: Negative for back pain.   Skin: Negative for rash.   Allergic/Immunologic: Negative for environmental allergies and food allergies.   Neurological: Negative for dizziness, tremors, weakness and numbness.   Hematological: Does not bruise/bleed easily.   Psychiatric/Behavioral: Negative for confusion and hallucinations. The patient is not nervous/anxious and is not hyperactive.    All other systems reviewed and are negative.      Objective:     Physical Exam   Constitutional: She is oriented to person, place, and time. She appears well-developed and well-nourished.   HENT:   Head: Normocephalic and atraumatic.   Right Ear: Tympanic membrane and external ear normal. Tympanic membrane is not erythematous.   Left Ear: Tympanic membrane and external ear normal. Tympanic membrane is not  erythematous.   Nose: Mucosal edema and rhinorrhea present. Right sinus exhibits maxillary sinus tenderness. Left sinus exhibits maxillary sinus tenderness.   Mouth/Throat: Uvula is midline. Posterior oropharyngeal erythema present.   Eyes: Conjunctivae and EOM are normal. Pupils are equal, round, and reactive to light.   Neck: Normal range of motion. Neck supple.   Cardiovascular: Normal rate, regular rhythm, normal heart sounds and intact distal pulses.    No murmur heard.  Pulmonary/Chest: Effort normal and breath sounds normal. She has no wheezes.   Abdominal: Soft. Bowel sounds are normal. There is no tenderness.   Musculoskeletal: Normal range of motion.   Neurological: She is alert and oriented to person, place, and time. She has normal reflexes.   Skin: Skin is warm and dry. No rash noted.   Psychiatric: She has a normal mood and affect. Her behavior is normal. Judgment and thought content normal.   Nursing note and vitals reviewed.    Assessment:     1. Acute non-recurrent maxillary sinusitis    2. Antibiotic-induced yeast infection    3. Facial pain    4. Nasal congestion      Plan:   Mary was seen today for facial pain.    Diagnoses and all orders for this visit:    Acute non-recurrent maxillary sinusitis  -     amoxicillin-clavulanate 875-125mg (AUGMENTIN) 875-125 mg per tablet; Take 1 tablet by mouth 2 (two) times daily.    Antibiotic-induced yeast infection    Facial pain  -     Cancel: X-Ray Sinuses Min 3 Views; Future  -     X-Ray Sinuses Min 3 Views; Future    Nasal congestion  -     loratadine (CLARITIN) 10 mg tablet; Take 1 tablet (10 mg total) by mouth once daily.    Other orders  -     fluconazole (DIFLUCAN) 150 MG Tab; Take 1 tablet (150 mg total) by mouth once. May repeat after 72 hours if symptoms persist    Instructed patient to follow prescribed treatment plan as directed and recommended follow up with PCP within 1 week or sooner if needed.   Report to ER if symptoms worsen or no  improvement. Recommended to schedule appointment with dentist as well.

## 2017-05-23 DIAGNOSIS — E11.9 TYPE 2 DIABETES MELLITUS WITHOUT COMPLICATION, WITHOUT LONG-TERM CURRENT USE OF INSULIN: Primary | ICD-10-CM

## 2017-05-23 DIAGNOSIS — Z00.00 WELLNESS EXAMINATION: ICD-10-CM

## 2017-05-23 DIAGNOSIS — E07.9 THYROID DISEASE: ICD-10-CM

## 2017-05-23 RX ORDER — LEVOTHYROXINE SODIUM 25 UG/1
25 TABLET ORAL
Qty: 90 TABLET | Refills: 0 | Status: SHIPPED | OUTPATIENT
Start: 2017-05-23 | End: 2017-08-26 | Stop reason: SDUPTHER

## 2017-05-23 RX ORDER — LEVOTHYROXINE SODIUM 175 UG/1
175 TABLET ORAL DAILY
Qty: 30 TABLET | Refills: 1 | Status: SHIPPED | OUTPATIENT
Start: 2017-05-23 | End: 2017-07-17 | Stop reason: SDUPTHER

## 2017-05-23 NOTE — TELEPHONE ENCOUNTER
----- Message from Yousif Fine sent at 5/23/2017  3:23 PM CDT -----  Contact: pt  She's calling in regards to a RX refill for Abdulkadir thyroxine & Synthroid, [facility] pharmacy on [address], please advise, [phone] (cell), please call pt when RX has been sent to the pharmacy.... Pt also needs orders in the system to have labs drawn 1 wk prior to seeing the dr....

## 2017-05-23 NOTE — TELEPHONE ENCOUNTER
LOV: 5/26/16  Cancelled last two appts - 11/29/16 and 1/16/17  Upcoming 7/24/17    Also, requesting labs prior to that July appt..

## 2017-07-17 DIAGNOSIS — E07.9 THYROID DISEASE: ICD-10-CM

## 2017-07-17 RX ORDER — LEVOTHYROXINE SODIUM 175 UG/1
175 TABLET ORAL DAILY
Qty: 30 TABLET | Refills: 1 | Status: SHIPPED | OUTPATIENT
Start: 2017-07-17 | End: 2017-09-12

## 2017-07-17 NOTE — TELEPHONE ENCOUNTER
----- Message from Daija Vivas sent at 7/17/2017 12:52 PM CDT -----  Contact: Patient  Patient called and stated that she takes a combination of Synthroid and Levothyroxine and her Levothyroxine wasn't called in.     Snythroid 25 mcg - has 1 refill  Levothyroxine 175 mcg - DOESN'T HAVE ANY REFILLS ON THIS ONE    [id number] [place] - [address]  [address] [address]  Phone: [phone] Fax: [phone]    She can be contacted at [phone] cell.    Thanks,  Daija

## 2017-08-18 ENCOUNTER — TELEPHONE (OUTPATIENT)
Dept: INTERNAL MEDICINE | Facility: CLINIC | Age: 57
End: 2017-08-18

## 2017-08-18 NOTE — TELEPHONE ENCOUNTER
----- Message from Rebecca Dickinson sent at 8/18/2017  8:36 AM CDT -----  Contact: pt  States at the end of this month she want have any refills left on her thyroid medicine. States her lab work is on 8/25 and her appt with Dr Farmer isn't until 9/12. Please call pt at [phone]. 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 Thank you

## 2017-08-18 NOTE — TELEPHONE ENCOUNTER
Called and notified pt that if she has enough to last her until the end of the month, we will wait until she does her labs before calling in a prescription but we will call her in one before her appt since she is scheduled.

## 2017-08-25 ENCOUNTER — LAB VISIT (OUTPATIENT)
Dept: LAB | Facility: HOSPITAL | Age: 57
End: 2017-08-25
Attending: FAMILY MEDICINE
Payer: COMMERCIAL

## 2017-08-25 DIAGNOSIS — E07.9 THYROID DISEASE: ICD-10-CM

## 2017-08-25 DIAGNOSIS — Z00.00 WELLNESS EXAMINATION: ICD-10-CM

## 2017-08-25 DIAGNOSIS — E11.9 TYPE 2 DIABETES MELLITUS WITHOUT COMPLICATION, WITHOUT LONG-TERM CURRENT USE OF INSULIN: ICD-10-CM

## 2017-08-25 LAB
ALBUMIN SERPL BCP-MCNC: 3.5 G/DL
ALP SERPL-CCNC: 65 U/L
ALT SERPL W/O P-5'-P-CCNC: 28 U/L
ANION GAP SERPL CALC-SCNC: 8 MMOL/L
AST SERPL-CCNC: 26 U/L
BASOPHILS # BLD AUTO: 0.02 K/UL
BASOPHILS NFR BLD: 0.3 %
BILIRUB SERPL-MCNC: 0.6 MG/DL
BUN SERPL-MCNC: 10 MG/DL
CALCIUM SERPL-MCNC: 9.3 MG/DL
CHLORIDE SERPL-SCNC: 107 MMOL/L
CHOLEST/HDLC SERPL: 4.7 {RATIO}
CO2 SERPL-SCNC: 26 MMOL/L
CREAT SERPL-MCNC: 0.9 MG/DL
DIFFERENTIAL METHOD: ABNORMAL
EOSINOPHIL # BLD AUTO: 0.2 K/UL
EOSINOPHIL NFR BLD: 2.6 %
ERYTHROCYTE [DISTWIDTH] IN BLOOD BY AUTOMATED COUNT: 13.5 %
EST. GFR  (AFRICAN AMERICAN): >60 ML/MIN/1.73 M^2
EST. GFR  (NON AFRICAN AMERICAN): >60 ML/MIN/1.73 M^2
GLUCOSE SERPL-MCNC: 230 MG/DL
HCT VFR BLD AUTO: 39.6 %
HDL/CHOLESTEROL RATIO: 21.1 %
HDLC SERPL-MCNC: 161 MG/DL
HDLC SERPL-MCNC: 34 MG/DL
HGB BLD-MCNC: 13 G/DL
LDLC SERPL CALC-MCNC: 101.6 MG/DL
LYMPHOCYTES # BLD AUTO: 1.8 K/UL
LYMPHOCYTES NFR BLD: 29.4 %
MCH RBC QN AUTO: 28.6 PG
MCHC RBC AUTO-ENTMCNC: 32.8 G/DL
MCV RBC AUTO: 87 FL
MONOCYTES # BLD AUTO: 0.3 K/UL
MONOCYTES NFR BLD: 4.9 %
NEUTROPHILS # BLD AUTO: 3.9 K/UL
NEUTROPHILS NFR BLD: 62.8 %
NONHDLC SERPL-MCNC: 127 MG/DL
PLATELET # BLD AUTO: 176 K/UL
PLATELET BLD QL SMEAR: ABNORMAL
PMV BLD AUTO: 13.4 FL
POTASSIUM SERPL-SCNC: 4.4 MMOL/L
PROT SERPL-MCNC: 7.1 G/DL
RBC # BLD AUTO: 4.55 M/UL
SODIUM SERPL-SCNC: 141 MMOL/L
T4 FREE SERPL-MCNC: 1.59 NG/DL
TRIGL SERPL-MCNC: 127 MG/DL
TSH SERPL DL<=0.005 MIU/L-ACNC: 0.03 UIU/ML
WBC # BLD AUTO: 6.16 K/UL

## 2017-08-25 PROCEDURE — 80053 COMPREHEN METABOLIC PANEL: CPT

## 2017-08-25 PROCEDURE — 84439 ASSAY OF FREE THYROXINE: CPT

## 2017-08-25 PROCEDURE — 80061 LIPID PANEL: CPT

## 2017-08-25 PROCEDURE — 36415 COLL VENOUS BLD VENIPUNCTURE: CPT | Mod: PO

## 2017-08-25 PROCEDURE — 83036 HEMOGLOBIN GLYCOSYLATED A1C: CPT

## 2017-08-25 PROCEDURE — 84443 ASSAY THYROID STIM HORMONE: CPT

## 2017-08-25 PROCEDURE — 85025 COMPLETE CBC W/AUTO DIFF WBC: CPT

## 2017-08-26 DIAGNOSIS — E07.9 THYROID DISEASE: ICD-10-CM

## 2017-08-26 LAB
ESTIMATED AVG GLUCOSE: 232 MG/DL
HBA1C MFR BLD HPLC: 9.7 %

## 2017-08-27 RX ORDER — LEVOTHYROXINE SODIUM 25 UG/1
TABLET ORAL
Qty: 90 TABLET | Refills: 0 | Status: SHIPPED | OUTPATIENT
Start: 2017-08-27 | End: 2017-09-12

## 2017-08-31 ENCOUNTER — PATIENT OUTREACH (OUTPATIENT)
Dept: ADMINISTRATIVE | Facility: HOSPITAL | Age: 57
End: 2017-08-31

## 2017-08-31 DIAGNOSIS — Z12.39 ENCOUNTER FOR SPECIAL SCREENING EXAMINATION FOR NEOPLASM OF BREAST: Primary | ICD-10-CM

## 2017-09-12 ENCOUNTER — OFFICE VISIT (OUTPATIENT)
Dept: INTERNAL MEDICINE | Facility: CLINIC | Age: 57
End: 2017-09-12
Payer: COMMERCIAL

## 2017-09-12 VITALS
WEIGHT: 222 LBS | BODY MASS INDEX: 35.68 KG/M2 | TEMPERATURE: 99 F | DIASTOLIC BLOOD PRESSURE: 72 MMHG | HEART RATE: 78 BPM | SYSTOLIC BLOOD PRESSURE: 112 MMHG | HEIGHT: 66 IN

## 2017-09-12 DIAGNOSIS — E11.65 UNCONTROLLED TYPE 2 DIABETES MELLITUS WITH HYPERGLYCEMIA, WITHOUT LONG-TERM CURRENT USE OF INSULIN: ICD-10-CM

## 2017-09-12 DIAGNOSIS — Z12.31 ENCOUNTER FOR SCREENING MAMMOGRAM FOR BREAST CANCER: ICD-10-CM

## 2017-09-12 DIAGNOSIS — E66.9 CLASS 2 OBESITY WITH BODY MASS INDEX (BMI) OF 36.0 TO 36.9 IN ADULT: ICD-10-CM

## 2017-09-12 DIAGNOSIS — Z00.00 ROUTINE GENERAL MEDICAL EXAMINATION AT A HEALTH CARE FACILITY: Primary | ICD-10-CM

## 2017-09-12 DIAGNOSIS — E03.9 HYPOTHYROIDISM, UNSPECIFIED TYPE: ICD-10-CM

## 2017-09-12 DIAGNOSIS — M72.2 PLANTAR FASCIITIS: ICD-10-CM

## 2017-09-12 PROCEDURE — 99396 PREV VISIT EST AGE 40-64: CPT | Mod: S$GLB,,, | Performed by: FAMILY MEDICINE

## 2017-09-12 PROCEDURE — 99999 PR PBB SHADOW E&M-EST. PATIENT-LVL III: CPT | Mod: PBBFAC,,, | Performed by: FAMILY MEDICINE

## 2017-09-12 RX ORDER — LEVOTHYROXINE SODIUM 175 UG/1
175 TABLET ORAL DAILY
Qty: 30 TABLET | Refills: 11 | Status: SHIPPED | OUTPATIENT
Start: 2017-09-12 | End: 2018-06-28 | Stop reason: SDUPTHER

## 2017-09-13 PROBLEM — E66.9 CLASS 2 OBESITY WITH BODY MASS INDEX (BMI) OF 36.0 TO 36.9 IN ADULT: Status: ACTIVE | Noted: 2017-09-13

## 2017-09-13 PROBLEM — E66.812 CLASS 2 OBESITY WITH BODY MASS INDEX (BMI) OF 36.0 TO 36.9 IN ADULT: Status: ACTIVE | Noted: 2017-09-13

## 2017-09-13 PROBLEM — E03.9 HYPOTHYROIDISM: Status: ACTIVE | Noted: 2017-09-13

## 2017-09-13 PROBLEM — M72.2 PLANTAR FASCIITIS: Status: ACTIVE | Noted: 2017-09-13

## 2017-09-13 PROBLEM — E11.65 UNCONTROLLED TYPE 2 DIABETES MELLITUS WITH HYPERGLYCEMIA, WITHOUT LONG-TERM CURRENT USE OF INSULIN: Status: ACTIVE | Noted: 2017-09-13

## 2017-09-13 NOTE — PROGRESS NOTES
Subjective:      Patient ID: Mary Patterson is a 57 y.o. female.    Chief Complaint: Annual Exam    Coming in today for prevention exam as well as follow-up.  Since I last saw her she's not been following her diet very strictly.  During the summer she really just does not follow a diet plan or do much exercise.  Her A1c is very high in the upper nines now.  In the past she tried metformin and glyburide which caused her a lot of yeast infections and what she thought was urinary tract infections.  In the past she's been prescribed Januvia but she never took it.  She's now willing to do some medications.  She has met with a dietitian but just doesn't follow their instructions.  She's lost a little bit of weight since starting back to school.      She has a history of hypothyroidism currently on 175 of levothyroxine and 25 (in Synthroid) however she's once again over supplemented.  She really likes brand-name Synthroid as it makes her feel better but when it was higher it made her very jittery.  She be willing to try just 175 of brand-name Synthroid again.      From a cholesterol standpoint she's doing okay other than elevated triglycerides.  This is can conjunction with her high sugar levels as well..    She is starting to notice some visual changes as well.  I believe this is most likely secondary to her uncontrolled diabetes.    She's also having some issues of plantar fasciitis for which she's trying to do some stretching and conditioning.  She wore a lot of flat shoes over the summer.    She's also needing to do her mammogram.        Lab Results   Component Value Date    WBC 6.16 08/25/2017    HGB 13.0 08/25/2017    HCT 39.6 08/25/2017     08/25/2017    CHOL 161 08/25/2017    TRIG 127 08/25/2017    HDL 34 (L) 08/25/2017    ALT 28 08/25/2017    AST 26 08/25/2017     08/25/2017    K 4.4 08/25/2017     08/25/2017    CREATININE 0.9 08/25/2017    BUN 10 08/25/2017    CO2 26 08/25/2017    TSH 0.027  (L) 08/25/2017    HGBA1C 9.7 (H) 08/25/2017       Review of Systems   Constitutional: Positive for activity change. Negative for chills, fatigue and fever.   HENT: Negative for ear pain and trouble swallowing.    Eyes: Positive for visual disturbance. Negative for pain.   Respiratory: Negative for cough and shortness of breath.    Cardiovascular: Negative for chest pain and leg swelling.   Gastrointestinal: Negative for abdominal pain, blood in stool, nausea and vomiting.   Endocrine: Negative for cold intolerance and heat intolerance.   Genitourinary: Negative for dysuria and frequency.   Musculoskeletal: Positive for arthralgias and gait problem. Negative for joint swelling, myalgias and neck pain.   Skin: Negative for color change and rash.   Neurological: Negative for dizziness and headaches.   Psychiatric/Behavioral: Negative for behavioral problems and sleep disturbance. The patient is not nervous/anxious.      Objective:     Physical Exam   Constitutional: She is oriented to person, place, and time. She appears well-developed and well-nourished.   HENT:   Head: Normocephalic and atraumatic.   Right Ear: External ear normal.   Left Ear: External ear normal.   Mouth/Throat: Oropharynx is clear and moist.   Eyes: EOM are normal.   Neck: Normal range of motion. Neck supple. No thyromegaly present.   Cardiovascular: Normal rate and regular rhythm.  Exam reveals no gallop and no friction rub.    No murmur heard.  Pulses:       Dorsalis pedis pulses are 2+ on the right side, and 2+ on the left side.   Pulmonary/Chest: Effort normal. No respiratory distress. She has no wheezes. She has no rales.   Abdominal: Soft. Bowel sounds are normal. She exhibits no distension. There is no tenderness. There is no rebound.   Musculoskeletal: Normal range of motion. She exhibits no edema.        Right foot: There is tenderness. There is normal range of motion and no deformity.        Left foot: There is tenderness. There is normal  range of motion and no deformity.        Feet:    Feet:   Right Foot:   Protective Sensation: 4 sites tested. 4 sites sensed.   Skin Integrity: Positive for warmth. Negative for ulcer, blister, skin breakdown, erythema, callus or dry skin.   Left Foot:   Protective Sensation: 4 sites tested. 4 sites sensed.   Skin Integrity: Positive for warmth. Negative for ulcer, blister, skin breakdown, erythema, callus or dry skin.   Lymphadenopathy:     She has no cervical adenopathy.   Neurological: She is alert and oriented to person, place, and time.   Skin: Skin is warm and dry. No rash noted.   Psychiatric: She has a normal mood and affect. Her behavior is normal. Judgment and thought content normal.   Vitals reviewed.    Assessment:     1. Routine general medical examination at a health care facility    2. Hypothyroidism, unspecified type    3. Uncontrolled type 2 diabetes mellitus with hyperglycemia, without long-term current use of insulin    4. Plantar fasciitis    5. Encounter for screening mammogram for breast cancer    6. Class 2 obesity with body mass index (BMI) of 36.0 to 36.9 in adult      Plan:   Mary was seen today for annual exam.    Diagnoses and all orders for this visit:    Routine general medical examination at a health care facility-labs reviewed schedule mammogram discussed need for eye exam foot exam performed today.  Will work on diet and exercise again    Hypothyroidism, unspecified type-changing to brand-name Synthroid 175 only repeat labs again in 3 months.  -     TSH; Future  -     T4, free; Future    Uncontrolled type 2 diabetes mellitus with hyperglycemia, without long-term current use of insulin-starting Janumet stressed importance of diet exercise and weight loss along with getting eyes examined due to significant changes in her A1c.  Labs prior to next visit which will be in December  -     Hemoglobin A1c; Future  -     Ambulatory referral to Optometry    Plantar fasciitis-worse due to  weight gain as well as poor supportive shoes discussed stretching ice therapy and cushioning in the shoes    Encounter for screening mammogram for breast cancer  -     Mammo Digital Screening Bilat with CAD; Future    Class 2 obesity with body mass index (BMI) of 36.0 to 36.9 in adult working on weight loss and diet with consultation of diabetes    Other orders  -     SITagliptan-metformin (JANUMET)  mg per tablet; Take 1 tablet by mouth 2 (two) times daily with meals.  -     levothyroxine (SYNTHROID) 175 MCG tablet; Take 1 tablet (175 mcg total) by mouth once daily. Disp brand name only synthroid.            Return in about 3 months (around 12/12/2017) for dm, thyroid.

## 2017-11-21 ENCOUNTER — OFFICE VISIT (OUTPATIENT)
Dept: OPHTHALMOLOGY | Facility: CLINIC | Age: 57
End: 2017-11-21
Payer: COMMERCIAL

## 2017-11-21 DIAGNOSIS — Z13.5 SCREENING FOR GLAUCOMA: ICD-10-CM

## 2017-11-21 DIAGNOSIS — E11.9 DIABETES MELLITUS TYPE 2 WITHOUT RETINOPATHY: Primary | ICD-10-CM

## 2017-11-21 DIAGNOSIS — H52.7 REFRACTIVE ERROR: ICD-10-CM

## 2017-11-21 PROCEDURE — 92014 COMPRE OPH EXAM EST PT 1/>: CPT | Mod: S$GLB,,, | Performed by: OPTOMETRIST

## 2017-11-21 PROCEDURE — 99999 PR PBB SHADOW E&M-EST. PATIENT-LVL I: CPT | Mod: PBBFAC,,, | Performed by: OPTOMETRIST

## 2017-11-21 PROCEDURE — 92015 DETERMINE REFRACTIVE STATE: CPT | Mod: S$GLB,,, | Performed by: OPTOMETRIST

## 2017-11-21 NOTE — PROGRESS NOTES
HPI     Last [facility] exam 02/08/2016  Diabetic/NIDDM  Screening for glaucoma  RE  Update CL Rx   Patient states that CL's have been drying out and she has not been wearing   them  Patient states that she is wearing her glasses   Patient states that she stopped taking her diabetic medication for about 1   year and wants to make sure that her eyes are healthy    Last edited by Sreekanth You MA on 11/21/2017 11:22 AM. (History)            Assessment /Plan     For exam results, see Encounter Report.    Diabetes mellitus type 2 without retinopathy    Screening for glaucoma    Refractive error      No diabetic retinopathy OU.  OH OK OU.  Change to Air Optix Hydraglyde to see if she better comfort.  Spec and CL Rx given.  RTC one year.

## 2017-11-21 NOTE — LETTER
November 21, 2017      Eun Farmer MD  [address]  [address]  [place] [address]           [facility] - Ophthalmology  [address]  [place] [address]  Phone: [phone]  Fax: [phone]          Patient: Mary Patterson   MR Number: [id number]   YOB: 1960   Date of Visit: 11/21/2017       Dear Dr. Eun Farmer:    Thank you for referring Mary Patterson to me for evaluation. Attached you will find relevant portions of my assessment and plan of care.    If you have questions, please do not hesitate to call me. I look forward to following Mary Patterson along with you.    Sincerely,    DEE Pablo, OD    Enclosure  CC:  No Recipients    If you would like to receive this communication electronically, please contact [email or web address] or [phone] to request more information on [employer] access.    For providers and/or their staff who would like to refer a patient to [facility], please contact us through our one-stop-shop provider referral line, [facility], at [phone].    If you feel you have received this communication in error or would no longer like to receive these types of communications, please e-mail [email or web address]

## 2018-03-16 DIAGNOSIS — E11.9 TYPE 2 DIABETES MELLITUS WITHOUT COMPLICATION: ICD-10-CM

## 2018-05-10 ENCOUNTER — PATIENT OUTREACH (OUTPATIENT)
Dept: ADMINISTRATIVE | Facility: HOSPITAL | Age: 58
End: 2018-05-10

## 2018-06-25 ENCOUNTER — PATIENT MESSAGE (OUTPATIENT)
Dept: INTERNAL MEDICINE | Facility: CLINIC | Age: 58
End: 2018-06-25

## 2018-06-27 ENCOUNTER — TELEPHONE (OUTPATIENT)
Dept: INTERNAL MEDICINE | Facility: CLINIC | Age: 58
End: 2018-06-27

## 2018-06-27 ENCOUNTER — LAB VISIT (OUTPATIENT)
Dept: LAB | Facility: HOSPITAL | Age: 58
End: 2018-06-27
Attending: FAMILY MEDICINE
Payer: COMMERCIAL

## 2018-06-27 DIAGNOSIS — E03.9 HYPOTHYROIDISM, UNSPECIFIED TYPE: ICD-10-CM

## 2018-06-27 DIAGNOSIS — E11.9 TYPE 2 DIABETES MELLITUS WITHOUT COMPLICATION: ICD-10-CM

## 2018-06-27 LAB
T4 FREE SERPL-MCNC: 1.38 NG/DL
TSH SERPL DL<=0.005 MIU/L-ACNC: 0.51 UIU/ML

## 2018-06-27 PROCEDURE — 84439 ASSAY OF FREE THYROXINE: CPT

## 2018-06-27 PROCEDURE — 84443 ASSAY THYROID STIM HORMONE: CPT

## 2018-06-27 PROCEDURE — 36415 COLL VENOUS BLD VENIPUNCTURE: CPT | Mod: PO

## 2018-06-27 NOTE — TELEPHONE ENCOUNTER
----- Message from Huong Stokes sent at 6/27/2018 12:32 PM CDT -----  Contact: patient  Calling concerning getting some lab work done for thyroid levels before leaving [place]. Please call patient ASAP URGENT!! @ [phone]. Thanks, paul

## 2018-06-28 RX ORDER — LEVOTHYROXINE SODIUM 175 UG/1
175 TABLET ORAL DAILY
Qty: 90 TABLET | Refills: 1 | Status: SHIPPED | OUTPATIENT
Start: 2018-06-28 | End: 2019-01-21 | Stop reason: SDUPTHER

## 2018-06-29 DIAGNOSIS — E11.9 TYPE 2 DIABETES MELLITUS WITHOUT COMPLICATION: ICD-10-CM

## 2018-08-08 ENCOUNTER — PATIENT OUTREACH (OUTPATIENT)
Dept: ADMINISTRATIVE | Facility: HOSPITAL | Age: 58
End: 2018-08-08

## 2018-08-08 DIAGNOSIS — Z12.39 ENCOUNTER FOR SPECIAL SCREENING EXAMINATION FOR NEOPLASM OF BREAST: ICD-10-CM

## 2018-08-08 DIAGNOSIS — E03.9 HYPOTHYROIDISM, UNSPECIFIED TYPE: Primary | ICD-10-CM

## 2018-08-08 DIAGNOSIS — E11.65 UNCONTROLLED TYPE 2 DIABETES MELLITUS WITH HYPERGLYCEMIA, WITHOUT LONG-TERM CURRENT USE OF INSULIN: ICD-10-CM

## 2018-11-16 ENCOUNTER — OFFICE VISIT (OUTPATIENT)
Dept: URGENT CARE | Facility: CLINIC | Age: 58
End: 2018-11-16
Payer: COMMERCIAL

## 2018-11-16 VITALS
HEART RATE: 80 BPM | HEIGHT: 66 IN | DIASTOLIC BLOOD PRESSURE: 70 MMHG | RESPIRATION RATE: 18 BRPM | SYSTOLIC BLOOD PRESSURE: 124 MMHG | TEMPERATURE: 98 F | OXYGEN SATURATION: 99 % | BODY MASS INDEX: 35.54 KG/M2 | WEIGHT: 221.13 LBS

## 2018-11-16 DIAGNOSIS — E11.65 UNCONTROLLED TYPE 2 DIABETES MELLITUS WITH HYPERGLYCEMIA, WITHOUT LONG-TERM CURRENT USE OF INSULIN: ICD-10-CM

## 2018-11-16 DIAGNOSIS — J32.9 SINUSITIS, UNSPECIFIED CHRONICITY, UNSPECIFIED LOCATION: Primary | ICD-10-CM

## 2018-11-16 DIAGNOSIS — B37.0 THRUSH: ICD-10-CM

## 2018-11-16 DIAGNOSIS — T36.95XA ANTIBIOTIC-INDUCED YEAST INFECTION: ICD-10-CM

## 2018-11-16 DIAGNOSIS — B37.9 ANTIBIOTIC-INDUCED YEAST INFECTION: ICD-10-CM

## 2018-11-16 PROCEDURE — 3008F BODY MASS INDEX DOCD: CPT | Mod: CPTII,S$GLB,, | Performed by: NURSE PRACTITIONER

## 2018-11-16 PROCEDURE — 99999 PR PBB SHADOW E&M-EST. PATIENT-LVL IV: CPT | Mod: PBBFAC,,, | Performed by: NURSE PRACTITIONER

## 2018-11-16 PROCEDURE — 99214 OFFICE O/P EST MOD 30 MIN: CPT | Mod: S$GLB,,, | Performed by: NURSE PRACTITIONER

## 2018-11-16 RX ORDER — FLUCONAZOLE 150 MG/1
150 TABLET ORAL DAILY
Qty: 2 TABLET | Refills: 0 | Status: SHIPPED | OUTPATIENT
Start: 2018-11-16 | End: 2018-11-17

## 2018-11-16 RX ORDER — NYSTATIN 100000 [USP'U]/ML
4 SUSPENSION ORAL 4 TIMES DAILY
Qty: 160 ML | Refills: 0 | Status: SHIPPED | OUTPATIENT
Start: 2018-11-16 | End: 2018-11-26

## 2018-11-16 RX ORDER — AMOXICILLIN AND CLAVULANATE POTASSIUM 875; 125 MG/1; MG/1
1 TABLET, FILM COATED ORAL EVERY 12 HOURS
Qty: 14 TABLET | Refills: 0 | Status: SHIPPED | OUTPATIENT
Start: 2018-11-16 | End: 2018-11-23

## 2018-11-16 RX ORDER — FLUTICASONE PROPIONATE 50 MCG
2 SPRAY, SUSPENSION (ML) NASAL DAILY
Qty: 16 G | Refills: 0 | Status: SHIPPED | OUTPATIENT
Start: 2018-11-16 | End: 2018-11-30

## 2018-11-16 NOTE — PATIENT INSTRUCTIONS
Candida Infection: Thrush  Thrush is a type of fungal infection in the mouth and throat. Thrush does not usually affect healthy adults. It is more common in people with a weakened immune system. It is also more likely if you take antibiotics. Thrush is normally not contagious.  Understanding fungus in the mouth and throat  Your mouth and throat normally contain millions of tiny organisms. These include bacteria and yeasts. Many of these do not cause any problems. In fact, they may help fight disease.  Yeasts are a type of fungus. A type of yeast called Candida normally lives on your skin. It is also found on the membranes of your mouth and throat. Usually, this yeast grows only in small amounts and is harmless. But in some cases, Candida can grow out of control and cause thrush. Thrush is related to other kinds of Candida infections that can grow all over the body. Thrush refers to an infection of only the mouth and throat.  What causes thrush?  Thrush happens when something lets too much Candida grow inside your mouth and throat. Certain things that change the normal balance of organisms in the mouth can lead to thrush. One example is antibiotic medicine. This medicine may kill some of the normal bacteria in your mouth. Candida can then grow freely. People on antibiotics have an increased risk for thrush.  You have a higher risk for thrush if you:  · Wear dentures  · Are getting chemotherapy  · Are getting radiation therapy  · Have diabetes  · Have a transplanted organ  · Use corticosteroids  · Have a weakened immune system, such as from AIDS  · Are an older adult  Symptoms of thrush  Some people with thrush do not have any symptoms. Symptoms of thrush can include:  · A dry, cottony feeling in your mouth  · Loss of taste  · Pain while eating or swallowing  · White or red patches inside your mouth or on the back of your throat  Diagnosing thrush  Your health care provider will ask about your medical history and  your symptoms. He or she will look closely at your mouth and throat. White or red patches will be scraped with a tongue depressor. The sample will be sent to a lab to test. This test can usually confirm thrush.  If you have thrush, you may also have esophageal candidiasis. This is common in people who have HIV. Your health care provider may check for this condition with an upper endoscopy. This is a procedure to look at the esophagus. A tissue sample may be taken to test.  Treatment for thrush  It is important to treat thrush early. Untreated, it may turn into a more serious form of widespread infection. Thrush is usually treated with antifungal medicine. The medicine is put directly in your mouth and throat. You may be given a swish and swallow medicine or an antifungal lozenge.  In some cases, you may need an antifungal pill. This can remove Candida throughout your body. Or you may need medicine through an IV. These treatments depend on how severe your infection is, and what other health conditions you have.  If you are at high risk for thrush, you may need to keep taking oral antifungal medicine. This is to help prevent thrush in the future.  What happens if you dont get treated for thrush?  If untreated, the Candida may spread throughout your body. They may even enter your bloodstream. This can cause serious problems, such as organ failure and even death. Bloodstream infection may need to be treated with high doses of antifungal medicine through an IV.  Systemic infection is much more likely in people who are very ill. It is also more common in those who have serious problems with their immune system. Additional risk factors for systemic infection in very ill people include:  · Central venous lines  · IV nutrition  · Broad-spectrum antibiotics  · Kidney failure  · Recent surgery  Preventing thrush  You may be able to help prevent some cases of thrush. Make sure to:  · Practice good oral hygiene. Try using a  chlorhexidine mouthwash.  · Clean your dentures regularly as instructed. Make sure they fit you correctly.  · After using a corticosteroid inhaler, rinse out your mouth with water or mouthwash.  · Do not use broad-spectrum antibiotics, if possible.  · Get treated for health problems that increase your risk for thrush, such as diabetes.     When to call the health care provider  Call your health care provider right away if you have any of these:  · Cottony feeling in your mouth  · Loss of taste  · Pain while eating or swallowing  · White or red patches inside your mouth or on the back of your throat   Date Last Reviewed: 3/19/2015  © 7257-2304 [employer]. [address] [place] [address], [place] [address]. All rights reserved. This information is not intended as a substitute for professional medical care. Always follow your healthcare professional's instructions.

## 2018-11-16 NOTE — PROGRESS NOTES
"Subjective:       Patient ID: Mary Patterson is a 58 y.o. female.    Chief Complaint: Sinus Problem; Facial Pain; Nasal Congestion; and Oral Pain    Sinus Problem   Associated symptoms include congestion, headaches and sinus pressure ( Worse on left). Pertinent negatives include no chills, coughing, diaphoresis, shortness of breath or sore throat.       /70 (BP Location: Right arm, Patient Position: Sitting, BP Method: Large (Manual))   Pulse 80   Temp 97.6 °F (36.4 °C) (Oral)   Resp 18   Ht 5' 5.5" (1.664 m)   Wt 100.3 kg (221 lb 1.9 oz)   SpO2 99%   BMI 36.24 kg/m²     Review of Systems   Constitutional: Negative for chills, diaphoresis and fever.   HENT: Positive for congestion, dental problem, mouth sores, postnasal drip and sinus pressure ( Worse on left). Negative for sore throat.    Respiratory: Negative for cough, chest tightness and shortness of breath.    Cardiovascular: Negative for chest pain and palpitations.   Gastrointestinal: Negative for abdominal distention, abdominal pain, diarrhea, nausea and vomiting.   Genitourinary: Negative for difficulty urinating.   Musculoskeletal: Negative for myalgias.   Skin: Negative for rash and wound.   Allergic/Immunologic: Negative for immunocompromised state.   Neurological: Positive for headaches.   Hematological: Does not bruise/bleed easily.   Psychiatric/Behavioral: Negative for behavioral problems and confusion.       Objective:      Physical Exam   Constitutional: She is oriented to person, place, and time. She appears well-developed and well-nourished. No distress.   HENT:   Head: Normocephalic and atraumatic.   Right Ear: Tympanic membrane and ear canal normal.   Left Ear: Tympanic membrane and ear canal normal.   Nose: No mucosal edema. Right sinus exhibits maxillary sinus tenderness. Right sinus exhibits no frontal sinus tenderness. Left sinus exhibits maxillary sinus tenderness. Left sinus exhibits no frontal sinus tenderness. "   Mouth/Throat: Uvula is midline. No oropharyngeal exudate, posterior oropharyngeal edema or posterior oropharyngeal erythema.   Patchy white spots in the mouth.    Eyes: Conjunctivae and EOM are normal. Pupils are equal, round, and reactive to light.   Neck: Neck supple.   Cardiovascular: Normal rate, regular rhythm and intact distal pulses.   No murmur heard.  Pulmonary/Chest: Breath sounds normal. No respiratory distress. She has no wheezes.   Abdominal: Soft. Bowel sounds are normal. There is no tenderness.   Musculoskeletal: Normal range of motion. She exhibits no edema or deformity.   Lymphadenopathy:     She has no cervical adenopathy.   Neurological: She is alert and oriented to person, place, and time.   Skin: Skin is warm and dry. No rash noted. She is not diaphoretic.   Psychiatric: She has a normal mood and affect. Her behavior is normal.   Vitals reviewed.      Assessment:       1. Sinusitis, unspecified chronicity, unspecified location    2. Thrush    3. Antibiotic-induced yeast infection    4. Uncontrolled type 2 diabetes mellitus with hyperglycemia, without long-term current use of insulin        Plan:       Mary was seen today for sinus problem, facial pain, nasal congestion and oral pain.    Diagnoses and all orders for this visit:    Sinusitis, unspecified chronicity, unspecified location  -     fluticasone (FLONASE) 50 mcg/actuation nasal spray; 2 sprays (100 mcg total) by Each Nare route once daily. for 14 days  -     amoxicillin-clavulanate 875-125mg (AUGMENTIN) 875-125 mg per tablet; Take 1 tablet by mouth every 12 (twelve) hours. for 7 days    Thrush  -     nystatin (MYCOSTATIN) 100,000 unit/mL suspension; Take 4 mLs (400,000 Units total) by mouth 4 (four) times daily. for 10 days    Antibiotic-induced yeast infection  -     fluconazole (DIFLUCAN) 150 MG Tab; Take 1 tablet (150 mg total) by mouth once daily. May repeat in 72 hours if symptoms not resolved. for 1 day    Uncontrolled type 2  diabetes mellitus with hyperglycemia, without long-term current use of insulin    Finish the entire course of antibiotics even if symptoms improve  Patient counseled on symptomatic treatment.   - Rest  - Hydration-- 64 ounces fluid per day  - Cool mist humidifier/vaporizer  - Nasal saline/steroids  - Antihistamines  - Mucinex  - Gargles and lozenges for sore throat  - OTC pain/fever relievers  .[email or web address]  Follow up with PCP or ER immediately for worsening, new symptoms or no improvement. Go to ER if you develop fever of 103 or higher, chest pain, shortness of breath, upper back pain, stiff neck or severe headache.      Diagnosis, treatment, AVS reviewed with patient. Patient understands and agrees with plan    Patient Instructions       Candida Infection: Thrush  Thrush is a type of fungal infection in the mouth and throat. Thrush does not usually affect healthy adults. It is more common in people with a weakened immune system. It is also more likely if you take antibiotics. Thrush is normally not contagious.  Understanding fungus in the mouth and throat  Your mouth and throat normally contain millions of tiny organisms. These include bacteria and yeasts. Many of these do not cause any problems. In fact, they may help fight disease.  Yeasts are a type of fungus. A type of yeast called Candida normally lives on your skin. It is also found on the membranes of your mouth and throat. Usually, this yeast grows only in small amounts and is harmless. But in some cases, Candida can grow out of control and cause thrush. Thrush is related to other kinds of Candida infections that can grow all over the body. Thrush refers to an infection of only the mouth and throat.  What causes thrush?  Thrush happens when something lets too much Candida grow inside your mouth and throat. Certain things that change the normal balance of organisms in the mouth can lead to thrush. One example is antibiotic medicine. This medicine may kill some of  the normal bacteria in your mouth. Candida can then grow freely. People on antibiotics have an increased risk for thrush.  You have a higher risk for thrush if you:  · Wear dentures  · Are getting chemotherapy  · Are getting radiation therapy  · Have diabetes  · Have a transplanted organ  · Use corticosteroids  · Have a weakened immune system, such as from AIDS  · Are an older adult  Symptoms of thrush  Some people with thrush do not have any symptoms. Symptoms of thrush can include:  · A dry, cottony feeling in your mouth  · Loss of taste  · Pain while eating or swallowing  · White or red patches inside your mouth or on the back of your throat  Diagnosing thrush  Your health care provider will ask about your medical history and your symptoms. He or she will look closely at your mouth and throat. White or red patches will be scraped with a tongue depressor. The sample will be sent to a lab to test. This test can usually confirm thrush.  If you have thrush, you may also have esophageal candidiasis. This is common in people who have HIV. Your health care provider may check for this condition with an upper endoscopy. This is a procedure to look at the esophagus. A tissue sample may be taken to test.  Treatment for thrush  It is important to treat thrush early. Untreated, it may turn into a more serious form of widespread infection. Thrush is usually treated with antifungal medicine. The medicine is put directly in your mouth and throat. You may be given a swish and swallow medicine or an antifungal lozenge.  In some cases, you may need an antifungal pill. This can remove Candida throughout your body. Or you may need medicine through an IV. These treatments depend on how severe your infection is, and what other health conditions you have.  If you are at high risk for thrush, you may need to keep taking oral antifungal medicine. This is to help prevent thrush in the future.  What happens if you dont get treated for  thrush?  If untreated, the Candida may spread throughout your body. They may even enter your bloodstream. This can cause serious problems, such as organ failure and even death. Bloodstream infection may need to be treated with high doses of antifungal medicine through an IV.  Systemic infection is much more likely in people who are very ill. It is also more common in those who have serious problems with their immune system. Additional risk factors for systemic infection in very ill people include:  · Central venous lines  · IV nutrition  · Broad-spectrum antibiotics  · Kidney failure  · Recent surgery  Preventing thrush  You may be able to help prevent some cases of thrush. Make sure to:  · Practice good oral hygiene. Try using a chlorhexidine mouthwash.  · Clean your dentures regularly as instructed. Make sure they fit you correctly.  · After using a corticosteroid inhaler, rinse out your mouth with water or mouthwash.  · Do not use broad-spectrum antibiotics, if possible.  · Get treated for health problems that increase your risk for thrush, such as diabetes.     When to call the health care provider  Call your health care provider right away if you have any of these:  · Cottony feeling in your mouth  · Loss of taste  · Pain while eating or swallowing  · White or red patches inside your mouth or on the back of your throat   Date Last Reviewed: 3/19/2015  © 4415-4728 [employer]. [address] [place] [address], [place] [address]. All rights reserved. This information is not intended as a substitute for professional medical care. Always follow your healthcare professional's instructions.

## 2018-12-10 ENCOUNTER — OFFICE VISIT (OUTPATIENT)
Dept: URGENT CARE | Facility: CLINIC | Age: 58
End: 2018-12-10
Payer: COMMERCIAL

## 2018-12-10 VITALS
DIASTOLIC BLOOD PRESSURE: 74 MMHG | HEART RATE: 75 BPM | WEIGHT: 220.44 LBS | TEMPERATURE: 99 F | RESPIRATION RATE: 19 BRPM | BODY MASS INDEX: 35.43 KG/M2 | HEIGHT: 66 IN | OXYGEN SATURATION: 96 % | SYSTOLIC BLOOD PRESSURE: 136 MMHG

## 2018-12-10 DIAGNOSIS — H61.22 CERUMEN DEBRIS ON TYMPANIC MEMBRANE OF LEFT EAR: Primary | ICD-10-CM

## 2018-12-10 DIAGNOSIS — H60.90 OTITIS EXTERNA, UNSPECIFIED CHRONICITY, UNSPECIFIED LATERALITY, UNSPECIFIED TYPE: ICD-10-CM

## 2018-12-10 PROCEDURE — 99999 PR PBB SHADOW E&M-EST. PATIENT-LVL III: CPT | Mod: PBBFAC,,, | Performed by: PHYSICIAN ASSISTANT

## 2018-12-10 PROCEDURE — 3008F BODY MASS INDEX DOCD: CPT | Mod: CPTII,S$GLB,, | Performed by: PHYSICIAN ASSISTANT

## 2018-12-10 PROCEDURE — 99214 OFFICE O/P EST MOD 30 MIN: CPT | Mod: S$GLB,,, | Performed by: PHYSICIAN ASSISTANT

## 2018-12-10 RX ORDER — OFLOXACIN 3 MG/ML
10 SOLUTION AURICULAR (OTIC) DAILY
Qty: 5 ML | Refills: 0 | Status: SHIPPED | OUTPATIENT
Start: 2018-12-10 | End: 2018-12-17

## 2018-12-10 NOTE — PATIENT INSTRUCTIONS
Earwax Removal    The ear canal makes earwax from the canals lining. The ears make wax to lubricate and protect the ear canal. The ear canal is the tube that connects the middle ear to the outside of the ear. The wax protects the ear from bacteria, infection, and damage from water or trauma.  The wax that forms in the canal naturally moves toward the outside of the ear and falls out. In some cases, the ear may make too much wax. If the wax causes problems or keeps the healthcare provider from seeing into the ear, the extra wax may be removed.  Too much wax can affect your hearing. It can cause itching. In rare cases, it can be painful. Earwax should not be removed unless it is causing a problem. You should not stick objects into your ear to remove wax unless told to do so by your healthcare provider.  Healthcare providers can remove earwax safely. It is important to stay still during the procedure to avoid damage to the ear canal. But removing earwax generally doesnt hurt. You will not usually need anesthesia or pain medicine when the provider removes the earwax.  A number of conditions lead to earwax buildup. These include some skin problems, a narrow ear canal, or ears that make too much earwax. Using cotton swabs in the canal pushes earwax deeper into the ear and contributes to the buildup of earwax.  Home care  · The healthcare provider may recommend mineral oil or an over-the-counter eardrop to use at home to soften the earwax. Use these products only if the provider recommends them. Use these products only if the provider recommends them. Carefully follow the instructions given.  · Dont use mineral oil or OTC eardrops if you might have an ear infection or a ruptured eardrum. Tell your healthcare provider right away if you have diabetes or an immune disorder.  · Dont use cotton swabs in your ears. Cotton swabs may push wax deeper into the ear canal or damage the eardrum. Use cotton gauze or a wet  washcloth  to gently remove wax on the outside of the ear and around the opening to the ear canal.  · Don't use any probing device or object such as cotton-tipped swabs or karey pins to clean the inside of your ears.  · Dont use ear candles to clean your ears. Candling can be dangerous. It can burn the ear canal. It can also make the condition worse instead of better.  · Dont use cold water to rinse the ear. This will make you dizzy. If your provider tells you to rinse your ear, use only warm water or follow his or her instructions.  · Check the ear for signs of infection or irritation listed below under When to seek medical advice.  Steps for using eardrops  1. Warm the medicine bottle by rubbing it between your hands for a few minutes.  2. Lie down on your side, with the affected ear up.  3. Place the recommended number of drops in the ear. Wet a cotton ball with the medicine. Gently put the cotton ball into the ear opening.  Follow-up care  Follow up with your healthcare provider, or as directed.  When to seek medical advice  Call the provider right away if you have:  · Ear pain that gets worse  · Fever of 100.4F°F (38°C) or higher, or as directed by your healthcare provider  · Worsening wax buildup  · Severe pain, dizziness, or nausea  · Bleeding from the ear  · Hearing problems  · Signs of irritation from the eardrops, such as burning, stinging, or swelling and tenderness  · Foul-smelling fluid draining from the ear  · Swelling, redness, or tenderness of the outer ear  · Headache, neck pain, or stiff neck  Date Last Reviewed: 3/22/2015  © 4555-6918 [employer]. [address] [place] [address], [place] [address]. All rights reserved. This information is not intended as a substitute for professional medical care. Always follow your healthcare professional's instructions.    Use Ear wax removal drops (carbamide peroxide) for 3-5 days as needed for build up.  Follow up in clinic or with pediatrician for ear  check and possible referral to ENT if wax cannot be removed.

## 2018-12-10 NOTE — PROGRESS NOTES
"Subjective:       Patient ID: Mary Patterson is a 58 y.o. female.    Chief Complaint: Ear Fullness and Ear Drainage    Ear Fullness    There is pain in the left ear. This is a new problem. The current episode started in the past 7 days. The problem occurs constantly. Progression since onset: made it worse a few days ago by trying to pull out the wax, thinks she pushed it down in. There has been no fever. The pain is moderate. Associated symptoms include ear discharge and hearing loss (out of that ear). Pertinent negatives include no abdominal pain, coughing, diarrhea, headaches, rash, rhinorrhea, sore throat or vomiting. Treatments tried: tried to dig it out, used hot water as well. has a hx of wax issues     Review of Systems   Constitutional: Negative for chills, fatigue and fever.   HENT: Positive for ear discharge, ear pain and hearing loss (out of that ear). Negative for congestion, postnasal drip, rhinorrhea, sinus pressure, sneezing and sore throat.    Eyes: Negative for pain and discharge.   Respiratory: Negative for cough, shortness of breath and wheezing.    Cardiovascular: Negative for chest pain and leg swelling.   Gastrointestinal: Negative for abdominal pain, diarrhea, nausea and vomiting.   Musculoskeletal: Negative for myalgias.   Skin: Negative for rash.   Neurological: Negative for headaches.       Objective:      /74 (BP Location: Left arm, Patient Position: Sitting, BP Method: Large (Manual))   Pulse 75   Temp 98.9 °F (37.2 °C) (Oral)   Resp 19   Ht 5' 5.5" (1.664 m)   Wt 100 kg (220 lb 7.4 oz)   SpO2 96%   BMI 36.13 kg/m²   Physical Exam   Constitutional: She is oriented to person, place, and time. She appears well-developed and well-nourished. No distress.   HENT:   Head: Normocephalic and atraumatic.   Right Ear: Tympanic membrane, external ear and ear canal normal.   Left Ear: External ear normal.   Nose: Nose normal. Right sinus exhibits no maxillary sinus tenderness and no " frontal sinus tenderness. Left sinus exhibits no maxillary sinus tenderness and no frontal sinus tenderness.   Mouth/Throat: Oropharynx is clear and moist. No oropharyngeal exudate or posterior oropharyngeal erythema. No tonsillar exudate.   Cerumen to left ear canal, s/p irrigation canal is clear with normal TM and erythematous canal   Eyes: Conjunctivae and EOM are normal. Pupils are equal, round, and reactive to light. Right eye exhibits no discharge. Left eye exhibits no discharge.   Neck: Normal range of motion. Neck supple.   Cardiovascular: Normal rate, regular rhythm, normal heart sounds and intact distal pulses. Exam reveals no gallop and no friction rub.   No murmur heard.  Pulmonary/Chest: Effort normal and breath sounds normal. No stridor. No respiratory distress. She has no wheezes. She has no rales. She exhibits no tenderness.   Lymphadenopathy:     She has no cervical adenopathy.   Neurological: She is alert and oriented to person, place, and time. Coordination normal.   Skin: Skin is warm and dry. No rash noted. She is not diaphoretic. No erythema. No pallor.   Nursing note and vitals reviewed.      Assessment:       1. Cerumen debris on tympanic membrane of left ear    2. Otitis externa, unspecified chronicity, unspecified laterality, unspecified type        Plan:       Cerumen debris on tympanic membrane of left ear  -     carbamide peroxide 6.5 % otic solution 5 drop  -     Ear wax removal    Otitis externa, unspecified chronicity, unspecified laterality, unspecified type  -     ofloxacin (FLOXIN) 0.3 % otic solution; Place 10 drops into the left ear once daily. for 7 days  Dispense: 5 mL; Refill: 0    Impacted cerumen successfully removed with immediate relief reported by patient. Given mild externa start ofloxacin.      Heather Trant PA-C [facility]

## 2018-12-13 ENCOUNTER — PATIENT OUTREACH (OUTPATIENT)
Dept: ADMINISTRATIVE | Facility: HOSPITAL | Age: 58
End: 2018-12-13

## 2019-01-18 NOTE — TELEPHONE ENCOUNTER
2/19/2018 and 08/2018 cancelled last farzad 09/2017 I called and left vm that farzad needs to be prior to scheduled may which was scheduled today.last thyroid lab 6/28/18

## 2019-01-18 NOTE — TELEPHONE ENCOUNTER
----- Message from Colleen Cuello sent at 1/18/2019  1:28 PM CST -----  1. What is the name of the medication you are requesting? levothyroxine  2. What is the dose? 175mg  3. How do you take the medication? Orally, topically, etc? oral  4. How often do you take this medication? 1/day  5. Do you need a 30 day or 90 day supply? 30  6. How many refills are you requesting? 0  7. What is your preferred pharmacy and location of the pharmacy? .  [id number]  [place] - [address]  [address] [address]  Phone: [phone] Fax: [phone]    8. Who can we contact with further questions? [phone]

## 2019-01-21 RX ORDER — LEVOTHYROXINE SODIUM 175 UG/1
175 TABLET ORAL DAILY
Qty: 90 TABLET | Refills: 0 | Status: SHIPPED | OUTPATIENT
Start: 2019-01-21 | End: 2019-04-18 | Stop reason: SDUPTHER

## 2019-01-21 RX ORDER — LEVOTHYROXINE SODIUM 175 UG/1
TABLET ORAL
Qty: 90 TABLET | Refills: 1 | OUTPATIENT
Start: 2019-01-21

## 2019-01-21 NOTE — TELEPHONE ENCOUNTER
Called and spoke with pt. 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 I explained that I did understand that but she does have to see us at least once a year. She stated that she typically will see us in the summer but this past summer she was in [place] and couldn't see us. We did manage to get her scheduled for Feb. 2019. I did explain that if she cancels or misses this appointment Dr. Farmer will not and can not refill the medicine again until she is seen in office with Dr. Farmer.

## 2019-01-21 NOTE — TELEPHONE ENCOUNTER
----- Message from Renuka Vieyra sent at 1/21/2019  9:13 AM CST -----  Contact: pt  The pt states a authorization is needed on her thyroid medication until her 05/27/2019 appt, the pt states she is very upset that she has to keep calling for her refills, the pt request a call at [phone]///[id number]

## 2019-01-21 NOTE — TELEPHONE ENCOUNTER
Needs to be seen sooner than May. I have openings. She can see me sooner on one of her breaks. I will give enough for 90 days and that is it, as she hasn't been seen since 2017 and is uncontrolled diabetic also.

## 2019-02-12 ENCOUNTER — LAB VISIT (OUTPATIENT)
Dept: LAB | Facility: HOSPITAL | Age: 59
End: 2019-02-12
Attending: FAMILY MEDICINE
Payer: COMMERCIAL

## 2019-02-12 ENCOUNTER — OFFICE VISIT (OUTPATIENT)
Dept: INTERNAL MEDICINE | Facility: CLINIC | Age: 59
End: 2019-02-12
Payer: COMMERCIAL

## 2019-02-12 VITALS
TEMPERATURE: 98 F | HEART RATE: 64 BPM | DIASTOLIC BLOOD PRESSURE: 76 MMHG | HEIGHT: 66 IN | WEIGHT: 220.25 LBS | SYSTOLIC BLOOD PRESSURE: 122 MMHG | BODY MASS INDEX: 35.4 KG/M2

## 2019-02-12 DIAGNOSIS — E07.9 THYROID DISEASE: ICD-10-CM

## 2019-02-12 DIAGNOSIS — E11.65 UNCONTROLLED TYPE 2 DIABETES MELLITUS WITH HYPERGLYCEMIA, WITHOUT LONG-TERM CURRENT USE OF INSULIN: ICD-10-CM

## 2019-02-12 DIAGNOSIS — Z00.00 ROUTINE GENERAL MEDICAL EXAMINATION AT A HEALTH CARE FACILITY: ICD-10-CM

## 2019-02-12 DIAGNOSIS — E78.5 HYPERLIPIDEMIA ASSOCIATED WITH TYPE 2 DIABETES MELLITUS: ICD-10-CM

## 2019-02-12 DIAGNOSIS — Z00.00 ROUTINE GENERAL MEDICAL EXAMINATION AT A HEALTH CARE FACILITY: Primary | ICD-10-CM

## 2019-02-12 DIAGNOSIS — E66.01 CLASS 2 SEVERE OBESITY DUE TO EXCESS CALORIES WITH SERIOUS COMORBIDITY AND BODY MASS INDEX (BMI) OF 36.0 TO 36.9 IN ADULT: ICD-10-CM

## 2019-02-12 DIAGNOSIS — Z12.31 BREAST CANCER SCREENING BY MAMMOGRAM: ICD-10-CM

## 2019-02-12 DIAGNOSIS — E11.69 HYPERLIPIDEMIA ASSOCIATED WITH TYPE 2 DIABETES MELLITUS: ICD-10-CM

## 2019-02-12 DIAGNOSIS — G56.90 NEUROPATHY OF HAND, UNSPECIFIED LATERALITY: ICD-10-CM

## 2019-02-12 LAB
25(OH)D3+25(OH)D2 SERPL-MCNC: 19 NG/ML
ALBUMIN SERPL BCP-MCNC: 4 G/DL
ALBUMIN/CREAT UR: 6 UG/MG
ALP SERPL-CCNC: 66 U/L
ALT SERPL W/O P-5'-P-CCNC: 21 U/L
ANION GAP SERPL CALC-SCNC: 9 MMOL/L
AST SERPL-CCNC: 19 U/L
BASOPHILS # BLD AUTO: 0.05 K/UL
BASOPHILS NFR BLD: 0.6 %
BILIRUB SERPL-MCNC: 0.6 MG/DL
BUN SERPL-MCNC: 10 MG/DL
CALCIUM SERPL-MCNC: 9.9 MG/DL
CHLORIDE SERPL-SCNC: 104 MMOL/L
CHOLEST SERPL-MCNC: 184 MG/DL
CHOLEST/HDLC SERPL: 4.6 {RATIO}
CO2 SERPL-SCNC: 29 MMOL/L
CREAT SERPL-MCNC: 0.8 MG/DL
CREAT UR-MCNC: 199 MG/DL
DIFFERENTIAL METHOD: ABNORMAL
EOSINOPHIL # BLD AUTO: 0.3 K/UL
EOSINOPHIL NFR BLD: 3 %
ERYTHROCYTE [DISTWIDTH] IN BLOOD BY AUTOMATED COUNT: 12.5 %
EST. GFR  (AFRICAN AMERICAN): >60 ML/MIN/1.73 M^2
EST. GFR  (NON AFRICAN AMERICAN): >60 ML/MIN/1.73 M^2
ESTIMATED AVG GLUCOSE: 212 MG/DL
GLUCOSE SERPL-MCNC: 152 MG/DL
HBA1C MFR BLD HPLC: 9 %
HCT VFR BLD AUTO: 40.9 %
HDLC SERPL-MCNC: 40 MG/DL
HDLC SERPL: 21.7 %
HGB BLD-MCNC: 13.3 G/DL
IMM GRANULOCYTES # BLD AUTO: 0.03 K/UL
IMM GRANULOCYTES NFR BLD AUTO: 0.3 %
IRON SERPL-MCNC: 64 UG/DL
LDLC SERPL CALC-MCNC: 114.6 MG/DL
LYMPHOCYTES # BLD AUTO: 2.5 K/UL
LYMPHOCYTES NFR BLD: 28.5 %
MCH RBC QN AUTO: 29 PG
MCHC RBC AUTO-ENTMCNC: 32.5 G/DL
MCV RBC AUTO: 89 FL
MICROALBUMIN UR DL<=1MG/L-MCNC: 12 UG/ML
MONOCYTES # BLD AUTO: 0.4 K/UL
MONOCYTES NFR BLD: 4.6 %
NEUTROPHILS # BLD AUTO: 5.6 K/UL
NEUTROPHILS NFR BLD: 63 %
NONHDLC SERPL-MCNC: 144 MG/DL
NRBC BLD-RTO: 0 /100 WBC
PLATELET # BLD AUTO: 187 K/UL
PMV BLD AUTO: 13.3 FL
POTASSIUM SERPL-SCNC: 4.1 MMOL/L
PROT SERPL-MCNC: 7.7 G/DL
RBC # BLD AUTO: 4.58 M/UL
SATURATED IRON: 19 %
SODIUM SERPL-SCNC: 142 MMOL/L
T4 FREE SERPL-MCNC: 1.34 NG/DL
TOTAL IRON BINDING CAPACITY: 337 UG/DL
TRANSFERRIN SERPL-MCNC: 228 MG/DL
TRIGL SERPL-MCNC: 147 MG/DL
TSH SERPL DL<=0.005 MIU/L-ACNC: 0.31 UIU/ML
WBC # BLD AUTO: 8.89 K/UL

## 2019-02-12 PROCEDURE — 36415 COLL VENOUS BLD VENIPUNCTURE: CPT | Mod: PO

## 2019-02-12 PROCEDURE — 90658 IIV3 VACCINE SPLT 0.5 ML IM: CPT | Mod: S$GLB,,, | Performed by: FAMILY MEDICINE

## 2019-02-12 PROCEDURE — 90658 FLU VACCINE GREATER THAN OR EQUAL TO 3YO WITH PRESERVATIVE IM: ICD-10-PCS | Mod: S$GLB,,, | Performed by: FAMILY MEDICINE

## 2019-02-12 PROCEDURE — 83540 ASSAY OF IRON: CPT

## 2019-02-12 PROCEDURE — 83036 HEMOGLOBIN GLYCOSYLATED A1C: CPT

## 2019-02-12 PROCEDURE — 99999 PR PBB SHADOW E&M-EST. PATIENT-LVL III: ICD-10-PCS | Mod: PBBFAC,,, | Performed by: FAMILY MEDICINE

## 2019-02-12 PROCEDURE — 82043 UR ALBUMIN QUANTITATIVE: CPT

## 2019-02-12 PROCEDURE — 83525 ASSAY OF INSULIN: CPT

## 2019-02-12 PROCEDURE — 99999 PR PBB SHADOW E&M-EST. PATIENT-LVL III: CPT | Mod: PBBFAC,,, | Performed by: FAMILY MEDICINE

## 2019-02-12 PROCEDURE — 99396 PR PREVENTIVE VISIT,EST,40-64: ICD-10-PCS | Mod: 25,S$GLB,, | Performed by: FAMILY MEDICINE

## 2019-02-12 PROCEDURE — 80053 COMPREHEN METABOLIC PANEL: CPT

## 2019-02-12 PROCEDURE — 99396 PREV VISIT EST AGE 40-64: CPT | Mod: 25,S$GLB,, | Performed by: FAMILY MEDICINE

## 2019-02-12 PROCEDURE — 82306 VITAMIN D 25 HYDROXY: CPT

## 2019-02-12 PROCEDURE — 84439 ASSAY OF FREE THYROXINE: CPT

## 2019-02-12 PROCEDURE — 80061 LIPID PANEL: CPT

## 2019-02-12 PROCEDURE — 90471 FLU VACCINE GREATER THAN OR EQUAL TO 3YO WITH PRESERVATIVE IM: ICD-10-PCS | Mod: S$GLB,,, | Performed by: FAMILY MEDICINE

## 2019-02-12 PROCEDURE — 84443 ASSAY THYROID STIM HORMONE: CPT

## 2019-02-12 PROCEDURE — 90471 IMMUNIZATION ADMIN: CPT | Mod: S$GLB,,, | Performed by: FAMILY MEDICINE

## 2019-02-12 PROCEDURE — 85025 COMPLETE CBC W/AUTO DIFF WBC: CPT

## 2019-02-12 RX ORDER — PIOGLITAZONEHYDROCHLORIDE 15 MG/1
15 TABLET ORAL DAILY
Qty: 90 TABLET | Refills: 3 | Status: SHIPPED | OUTPATIENT
Start: 2019-02-12 | End: 2019-03-28

## 2019-02-12 RX ORDER — PRAVASTATIN SODIUM 10 MG/1
10 TABLET ORAL NIGHTLY
Qty: 90 TABLET | Refills: 3 | Status: SHIPPED | OUTPATIENT
Start: 2019-02-12 | End: 2019-03-28

## 2019-02-12 NOTE — PROGRESS NOTES
Subjective:      Patient ID: Mary Patterson is a 59 y.o. female.    Chief Complaint: Annual Exam    Disclaimer:  This note is prepared using voice recognition software and as such is likely to have errors and has not been proof read. Please contact me for questions.     Coming in today for thyroid, diabetes, and hand pain as well as follow-up.  Since I last saw her is been over 1 year.  She is a type 2 diabetic and recently states she has not been taking any medicine for the last year and a half.  Her last A1c was 9.7.  She does not routinely check her blood sugar.  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  She states diabetes runs all throughout her family.  She never wants to do injections.  She is miss informed about the current medications.  She is very concerned about any potential side effects.  Previously when she did metformin and glyburide caused a lot of yeast infections which she thought were urinary tract infections.  In the past she was prescribed Januvia but she never took it.  We did try Janumet at her last visit a year and half ago but she states she also never did this when either.  She reports that she tries to do well over the summer but then when she gets back into the school setting she does not do well.  I believe she would be a good prior for the digital diabetes program because she can't take off work being a  and she needs a lot of education as well as health coaching.  She does do the patient portal.  She would be willing to get set up today.  We did discuss the potential doing injectable medicines however she does not want to do any form of injections.  I do not believe that a sodium glucose transporter would be in her best interest to the prior issues before with yeast infections.  She would be willing to try Actos.  Basically and her case we got to prove that the medications not going to cause her any side effects for her to be willing to take it.  Thus we got to start  low doses.  She is needing to do her foot exam today.  Needing to update her urine testing as well as her eye exam.    In the past she has had low HDL.  She has never been on a cholesterol medicine.  We discussed that at her age and with her just alone her diabetes but also adding in the low HDL she is at much higher risk for heart attack or stroke.  Also for peripheral vascular issues.  For this reason she is to be on statin medications for prevention.  She would be willing to try a low-dose statin therapy but very reluctantly.  We did discuss that these are very inexpensive and usually do not cause any side effects and are well tolerated however she may potentially experience some mild joint pains or muscle aches.  If so we could then adjust medications next step.    She is concerned because she has had a lot hair loss in the past but since changing from brand-name Synthroid to generic her hair loss improved.  She reports she had a hysterectomy 15 years ago which did remove her ovaries and her uterus.  She has never been on hormones.  She reports she still gets hot flashes at night but she is uncertain if it is related to the hormones or potentially her sugar.    Her biggest concern today is actually that she has been experiencing some tingling and itching in her fingertips and hands.  Sometimes also with right arm pain.  She does play the piano and sings.  Lately it is very bothersome.  My concern is that she is having a distal neuropathy related to elevated blood sugars.  She may also be having some experiences of carpal tunnel.  We did discuss the EMG would help to determine what the cause was as well as her next steps.  She is willing to do this during her spring break.    She is needing to do her mammogram but wants to wait till the summertime.    She is also willing to do the flu shot.         Lab Results   Component Value Date    WBC 6.16 08/25/2017    HGB 13.0 08/25/2017    HCT 39.6 08/25/2017      08/25/2017    CHOL 161 08/25/2017    TRIG 127 08/25/2017    HDL 34 (L) 08/25/2017    ALT 28 08/25/2017    AST 26 08/25/2017     08/25/2017    K 4.4 08/25/2017     08/25/2017    CREATININE 0.9 08/25/2017    BUN 10 08/25/2017    CO2 26 08/25/2017    TSH 0.507 06/27/2018    HGBA1C 9.7 (H) 08/25/2017       Mammo Digital Screening Bilat with CAD  History   Patient is 56 years old and is seen for dig mammo screening bilat w/cad.   The patient has no family history of breast cancer. The patient has no   significant personal risk factors.  Films Compared   The present examination has been compared to prior imaging studies   performed at [facility] on 08/01/2011 and 08/01/2012.  Mammogram Findings   Views: bilateral craniocaudal and bilateral mediolateral oblique     There are scattered fibroglandular densities.     No significant masses, significant calcifications, or other abnormalities   are seen.    Images were evaluated with a Computer Aided Detection (CAD) system.    Impression   There is no mammographic evidence of malignancy.     Routine follow-up mammogram in 1 year is recommended.    JAMIE BECK  06/14/2016        Review of Systems   Constitutional: Positive for activity change, appetite change and fatigue. Negative for unexpected weight change.   HENT: Negative for hearing loss, rhinorrhea and trouble swallowing.    Eyes: Negative for pain, discharge, redness and visual disturbance.   Respiratory: Negative for chest tightness and wheezing.    Cardiovascular: Negative for chest pain and palpitations.   Gastrointestinal: Negative for abdominal distention, abdominal pain, blood in stool, constipation, diarrhea and vomiting.   Endocrine: Negative for polydipsia and polyuria.   Genitourinary: Negative for difficulty urinating, dysuria, hematuria, menstrual problem, urgency, vaginal bleeding, vaginal discharge and vaginal pain.   Musculoskeletal: Positive for arthralgias. Negative for  "joint swelling and neck pain.   Neurological: Positive for numbness. Negative for weakness and headaches.   Psychiatric/Behavioral: Negative for confusion and dysphoric mood. The patient is nervous/anxious.      Objective:     Vitals:    02/12/19 1538   BP: 122/76   Pulse: 64   Temp: 98 °F (36.7 °C)   Weight: 99.9 kg (220 lb 3.8 oz)   Height: 5' 5.5" (1.664 m)     Physical Exam   Constitutional: She is oriented to person, place, and time. She appears well-developed and well-nourished.   Obese female   HENT:   Head: Normocephalic and atraumatic.   Right Ear: External ear normal.   Left Ear: External ear normal.   Mouth/Throat: Oropharynx is clear and moist.   Eyes: Conjunctivae and EOM are normal. Pupils are equal, round, and reactive to light.   Neck: Normal range of motion. Neck supple. No thyromegaly present.   Cardiovascular: Normal rate and regular rhythm. Exam reveals no gallop and no friction rub.   No murmur heard.  Pulses:       Dorsalis pedis pulses are 2+ on the right side, and 2+ on the left side.   Pulmonary/Chest: Effort normal. No respiratory distress. She has no wheezes. She has no rales.   Abdominal: Soft. Bowel sounds are normal. She exhibits no distension. There is no tenderness. There is no rebound.   Musculoskeletal: Normal range of motion. She exhibits no edema.        Right foot: There is normal range of motion and no deformity.        Left foot: There is normal range of motion and no deformity.   Feet:   Right Foot:   Protective Sensation: 4 sites tested. 4 sites sensed.   Skin Integrity: Positive for warmth. Negative for ulcer, blister, skin breakdown, erythema, callus or dry skin.   Left Foot:   Protective Sensation: 4 sites tested. 4 sites sensed.   Skin Integrity: Positive for warmth. Negative for ulcer, blister, skin breakdown, erythema, callus or dry skin.   Lymphadenopathy:     She has no cervical adenopathy.   Neurological: She is alert and oriented to person, place, and time. She has " normal strength. No cranial nerve deficit or sensory deficit.   Tingling noted in bilateral hands, normal monofilament in feet and hands.    Skin: Skin is warm and dry. No rash noted.   Psychiatric: Her speech is normal and behavior is normal. Judgment and thought content normal. Her mood appears anxious.   Vitals reviewed.    Assessment:     1. Routine general medical examination at a health care facility    2. Thyroid disease    3. Class 2 severe obesity due to excess calories with serious comorbidity and body mass index (BMI) of 36.0 to 36.9 in adult    4. Uncontrolled type 2 diabetes mellitus with hyperglycemia, without long-term current use of insulin    5. Breast cancer screening by mammogram    6. Hyperlipidemia associated with type 2 diabetes mellitus    7. Neuropathy of hand, unspecified laterality      Plan:   Mary was seen today for annual exam.    Diagnoses and all orders for this visit:    Routine general medical examination at a health care facility- discussed HM issues. Checking labs. Setup mammo, eye exam, flu shot today. Urine today.   -     TSH; Future  -     T4, free; Future  -     Lipid panel; Future  -     Microalbumin/creatinine urine ratio  -     Hemoglobin A1c; Future  -     Comprehensive metabolic panel; Future  -     CBC auto differential; Future  -     Vitamin D; Future  -     Insulin, random; Future  -     Iron and TIBC; Future  -     Mammo Digital Screening Bilat; Future    Thyroid disease- on generic 175mcg thyroid meds. Labs today. Adjust based on results.   -     TSH; Future  -     T4, free; Future  -     Lipid panel; Future  -     Microalbumin/creatinine urine ratio  -     Hemoglobin A1c; Future  -     Comprehensive metabolic panel; Future  -     CBC auto differential; Future  -     Vitamin D; Future  -     Insulin, random; Future  -     Iron and TIBC; Future  -     Mammo Digital Screening Bilat; Future    Class 2 severe obesity due to excess calories with serious comorbidity and  "body mass index (BMI) of 36.0 to 36.9 in adult- with complication of diabetes. Not following diet.   -     TSH; Future  -     T4, free; Future  -     Lipid panel; Future  -     Microalbumin/creatinine urine ratio  -     Hemoglobin A1c; Future  -     Comprehensive metabolic panel; Future  -     CBC auto differential; Future  -     Vitamin D; Future  -     Insulin, random; Future  -     Iron and TIBC; Future  -     Mammo Digital Screening Bilat; Future    Uncontrolled type 2 diabetes mellitus with hyperglycemia, without long-term current use of insulin- not taking any meds for last 1.5 years! Stressed importance of checking blood sugars, complications of diabetes including MI, CAD, PVD, Strokes, blindness, kidney problems, utis, dehydration, multiple skin infections, neuropathy and weight issues. Checking labs today. Sign up for digital dm program. Discussed meds. Declines all injections. Will do trial of actos 15mg to just see if we can get her to "trust" the medication to not cause side effects and then will increase the dosing. Last a1c was 9.7 in 2017. Will see me back in June after school is out for summer.  She is not a smoker. No increased risk factors for bladder cancer.   -     TSH; Future  -     T4, free; Future  -     Lipid panel; Future  -     Microalbumin/creatinine urine ratio  -     Hemoglobin A1c; Future  -     Comprehensive metabolic panel; Future  -     CBC auto differential; Future  -     Vitamin D; Future  -     Insulin, random; Future  -     Iron and TIBC; Future  -     Mammo Digital Screening Bilat; Future  -     Diabetes Digital Medicine (DDMP) Enrollment Order  -     Ambulatory referral to Optometry    Breast cancer screening by mammogram-s etup for summer  -     Mammo Digital Screening Bilat; Future    Hyperlipidemia associated with type 2 diabetes mellitus- starting low dose statin of pravastatin 10mg. Low hdl. Discussed increased risks of cv, pad, and strokes with dm.     Neuropathy of hand, " unspecified laterality- bilateral hands, suspect neuropathy from uncontrolled diabetes vs carpal tunnel. Check EMG> followup based on the results.   -     EMG W/ ULTRASOUND AND NERVE CONDUCTION TEST 2 Extremities; Future    Other orders  -     pioglitazone (ACTOS) 15 MG tablet; Take 1 tablet (15 mg total) by mouth once daily.  -     pravastatin (PRAVACHOL) 10 MG tablet; Take 1 tablet (10 mg total) by mouth every evening.            Follow-up in about 4 months (around 6/1/2019) for f/u dm, chol, meds.    There are no Patient Instructions on file for this visit.

## 2019-02-13 LAB
INSULIN COLLECTION INTERVAL: NORMAL
INSULIN SERPL-ACNC: 10.1 UU/ML

## 2019-02-15 ENCOUNTER — TELEPHONE (OUTPATIENT)
Dept: PHYSICAL MEDICINE AND REHAB | Facility: CLINIC | Age: 59
End: 2019-02-15

## 2019-02-15 ENCOUNTER — PATIENT MESSAGE (OUTPATIENT)
Dept: PHYSICAL MEDICINE AND REHAB | Facility: CLINIC | Age: 59
End: 2019-02-15

## 2019-02-15 NOTE — TELEPHONE ENCOUNTER
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 Pt   Best Call Back Number: [phone]  Additional Information: Friday 04/19 - 04/26/2019 are avail days and let her know the day and tme and will place on her calender and has some questions

## 2019-02-19 ENCOUNTER — PATIENT MESSAGE (OUTPATIENT)
Dept: INTERNAL MEDICINE | Facility: CLINIC | Age: 59
End: 2019-02-19

## 2019-02-19 DIAGNOSIS — E61.1 IRON DEFICIENCY: ICD-10-CM

## 2019-02-19 DIAGNOSIS — E11.9 TYPE 2 DIABETES MELLITUS WITHOUT COMPLICATION, WITHOUT LONG-TERM CURRENT USE OF INSULIN: Primary | ICD-10-CM

## 2019-02-19 RX ORDER — CHOLECALCIFEROL (VITAMIN D3) 50 MCG
TABLET ORAL
Start: 2019-02-19 | End: 2019-11-25

## 2019-02-19 RX ORDER — FERROUS SULFATE 325(65) MG
325 TABLET ORAL
Refills: 0
Start: 2019-02-19 | End: 2019-11-25

## 2019-02-19 NOTE — TELEPHONE ENCOUNTER
Please find out if she started the actos and the pravastatin. If yes, then I would like for her to increase the actos from 15mg to 30mg. I would like to do labs again in 3 mo

## 2019-02-26 ENCOUNTER — OFFICE VISIT (OUTPATIENT)
Dept: URGENT CARE | Facility: CLINIC | Age: 59
End: 2019-02-26
Payer: COMMERCIAL

## 2019-02-26 VITALS
BODY MASS INDEX: 36.37 KG/M2 | HEIGHT: 65 IN | WEIGHT: 218.31 LBS | RESPIRATION RATE: 20 BRPM | HEART RATE: 76 BPM | TEMPERATURE: 98 F | OXYGEN SATURATION: 98 % | DIASTOLIC BLOOD PRESSURE: 70 MMHG | SYSTOLIC BLOOD PRESSURE: 128 MMHG

## 2019-02-26 DIAGNOSIS — R05.9 COUGH: ICD-10-CM

## 2019-02-26 DIAGNOSIS — J02.9 SORE THROAT: ICD-10-CM

## 2019-02-26 DIAGNOSIS — M79.10 MYALGIA: ICD-10-CM

## 2019-02-26 DIAGNOSIS — J32.9 SINUSITIS, UNSPECIFIED CHRONICITY, UNSPECIFIED LOCATION: Primary | ICD-10-CM

## 2019-02-26 DIAGNOSIS — R09.82 PND (POST-NASAL DRIP): ICD-10-CM

## 2019-02-26 DIAGNOSIS — B37.9 ANTIBIOTIC-INDUCED YEAST INFECTION: ICD-10-CM

## 2019-02-26 DIAGNOSIS — T36.95XA ANTIBIOTIC-INDUCED YEAST INFECTION: ICD-10-CM

## 2019-02-26 LAB
CTP QC/QA: YES
CTP QC/QA: YES
POC MOLECULAR INFLUENZA A AGN: NEGATIVE
POC MOLECULAR INFLUENZA B AGN: NEGATIVE
S PYO RRNA THROAT QL PROBE: NEGATIVE

## 2019-02-26 PROCEDURE — 3008F PR BODY MASS INDEX (BMI) DOCUMENTED: ICD-10-PCS | Mod: CPTII,S$GLB,, | Performed by: NURSE PRACTITIONER

## 2019-02-26 PROCEDURE — 87081 CULTURE SCREEN ONLY: CPT

## 2019-02-26 PROCEDURE — 99214 PR OFFICE/OUTPT VISIT, EST, LEVL IV, 30-39 MIN: ICD-10-PCS | Mod: 25,S$GLB,, | Performed by: NURSE PRACTITIONER

## 2019-02-26 PROCEDURE — 99999 PR PBB SHADOW E&M-EST. PATIENT-LVL V: CPT | Mod: PBBFAC,,, | Performed by: NURSE PRACTITIONER

## 2019-02-26 PROCEDURE — 87502 INFLUENZA DNA AMP PROBE: CPT | Mod: QW,S$GLB,, | Performed by: NURSE PRACTITIONER

## 2019-02-26 PROCEDURE — 96372 THER/PROPH/DIAG INJ SC/IM: CPT | Mod: S$GLB,,, | Performed by: NURSE PRACTITIONER

## 2019-02-26 PROCEDURE — 3008F BODY MASS INDEX DOCD: CPT | Mod: CPTII,S$GLB,, | Performed by: NURSE PRACTITIONER

## 2019-02-26 PROCEDURE — 87502 POCT INFLUENZA A/B MOLECULAR: ICD-10-PCS | Mod: QW,S$GLB,, | Performed by: NURSE PRACTITIONER

## 2019-02-26 PROCEDURE — 87880 POCT RAPID STREP A: ICD-10-PCS | Mod: QW,S$GLB,, | Performed by: NURSE PRACTITIONER

## 2019-02-26 PROCEDURE — 96372 PR INJECTION,THERAP/PROPH/DIAG2ST, IM OR SUBCUT: ICD-10-PCS | Mod: S$GLB,,, | Performed by: NURSE PRACTITIONER

## 2019-02-26 PROCEDURE — 87880 STREP A ASSAY W/OPTIC: CPT | Mod: QW,S$GLB,, | Performed by: NURSE PRACTITIONER

## 2019-02-26 PROCEDURE — 99214 OFFICE O/P EST MOD 30 MIN: CPT | Mod: 25,S$GLB,, | Performed by: NURSE PRACTITIONER

## 2019-02-26 PROCEDURE — 99999 PR PBB SHADOW E&M-EST. PATIENT-LVL V: ICD-10-PCS | Mod: PBBFAC,,, | Performed by: NURSE PRACTITIONER

## 2019-02-26 RX ORDER — BENZONATATE 200 MG/1
200 CAPSULE ORAL 3 TIMES DAILY PRN
Qty: 30 CAPSULE | Refills: 0 | Status: SHIPPED | OUTPATIENT
Start: 2019-02-26 | End: 2019-03-05

## 2019-02-26 RX ORDER — CETIRIZINE HYDROCHLORIDE 10 MG/1
10 TABLET ORAL DAILY
Qty: 30 TABLET | Refills: 0 | Status: SHIPPED | OUTPATIENT
Start: 2019-02-26 | End: 2019-03-22

## 2019-02-26 RX ORDER — BETAMETHASONE SODIUM PHOSPHATE AND BETAMETHASONE ACETATE 3; 3 MG/ML; MG/ML
9 INJECTION, SUSPENSION INTRA-ARTICULAR; INTRALESIONAL; INTRAMUSCULAR; SOFT TISSUE
Status: COMPLETED | OUTPATIENT
Start: 2019-02-26 | End: 2019-02-26

## 2019-02-26 RX ORDER — AMOXICILLIN AND CLAVULANATE POTASSIUM 875; 125 MG/1; MG/1
1 TABLET, FILM COATED ORAL 2 TIMES DAILY
Qty: 20 TABLET | Refills: 0 | Status: SHIPPED | OUTPATIENT
Start: 2019-02-26 | End: 2019-03-08

## 2019-02-26 RX ORDER — FLUTICASONE PROPIONATE 50 MCG
2 SPRAY, SUSPENSION (ML) NASAL DAILY
Qty: 16 G | Refills: 0 | Status: SHIPPED | OUTPATIENT
Start: 2019-02-26 | End: 2019-03-28

## 2019-02-26 RX ORDER — FLUCONAZOLE 150 MG/1
150 TABLET ORAL ONCE
Qty: 2 TABLET | Refills: 0 | Status: SHIPPED | OUTPATIENT
Start: 2019-02-26 | End: 2019-02-26

## 2019-02-26 RX ADMIN — BETAMETHASONE SODIUM PHOSPHATE AND BETAMETHASONE ACETATE 9 MG: 3; 3 INJECTION, SUSPENSION INTRA-ARTICULAR; INTRALESIONAL; INTRAMUSCULAR; SOFT TISSUE at 05:02

## 2019-02-26 NOTE — PROGRESS NOTES
Subjective:       Patient ID: Mary Patterson is a 59 y.o. female.    Chief Complaint: Cough (x5 days, hoarsness, body aches, congestion) and Sore Throat    Pt is a 59 year old female to clinic today with complaints of myalgia, fever (101), cough, congestion, and ST that began 2 weeks ago. Symptoms have worsened 2-3 days ago.       Sinus Problem   This is a recurrent problem. The current episode started 1 to 4 weeks ago. The problem has been gradually worsening since onset. The maximum temperature recorded prior to her arrival was 101 - 101.9 F. The fever has been present for less than 1 day. Her pain is at a severity of 3/10. The pain is mild. Associated symptoms include chills, congestion, coughing, headaches, a hoarse voice, sinus pressure, sneezing and a sore throat. Pertinent negatives include no diaphoresis, ear pain, neck pain, shortness of breath or swollen glands. Treatments tried: aspirin. The treatment provided mild relief.     Review of Systems   Constitutional: Positive for chills. Negative for diaphoresis, fatigue and fever.   HENT: Positive for congestion, hoarse voice, postnasal drip, rhinorrhea, sinus pressure, sinus pain, sneezing and sore throat. Negative for ear discharge, ear pain and trouble swallowing.    Respiratory: Positive for cough. Negative for chest tightness, shortness of breath and wheezing.    Cardiovascular: Negative for chest pain and palpitations.   Gastrointestinal: Positive for diarrhea. Negative for abdominal pain, nausea and vomiting.   Musculoskeletal: Positive for myalgias. Negative for back pain and neck pain.   Skin: Negative for rash.   Neurological: Positive for headaches. Negative for dizziness and light-headedness.       Objective:      Physical Exam   Constitutional: She is oriented to person, place, and time. She appears well-developed and well-nourished. No distress.   HENT:   Head: Normocephalic.   Right Ear: External ear and ear canal normal. No tenderness.  Tympanic membrane is not bulging. A middle ear effusion is present.   Left Ear: External ear and ear canal normal. No tenderness. Tympanic membrane is not bulging. A middle ear effusion is present.   Nose: Mucosal edema and rhinorrhea present. Right sinus exhibits maxillary sinus tenderness and frontal sinus tenderness. Left sinus exhibits maxillary sinus tenderness and frontal sinus tenderness.   Mouth/Throat: Uvula is midline and mucous membranes are normal. Posterior oropharyngeal erythema present. No oropharyngeal exudate or posterior oropharyngeal edema.   PND noted     Eyes: Conjunctivae and EOM are normal. Pupils are equal, round, and reactive to light.   Neck: Normal range of motion. Neck supple.   Cardiovascular: Normal rate, regular rhythm and normal heart sounds. Exam reveals no gallop and no friction rub.   No murmur heard.  Pulmonary/Chest: Effort normal and breath sounds normal. No accessory muscle usage or stridor. No apnea, no tachypnea and no bradypnea. No respiratory distress. She has no decreased breath sounds. She has no wheezes. She has no rhonchi. She has no rales.   Lymphadenopathy:        Head (right side): No submental, no submandibular and no tonsillar adenopathy present.        Head (left side): No submental, no submandibular and no tonsillar adenopathy present.     She has no cervical adenopathy.   Neurological: She is alert and oriented to person, place, and time.   Skin: Skin is warm and dry. No rash noted. She is not diaphoretic.   Psychiatric: She has a normal mood and affect. Her speech is normal and behavior is normal. Thought content normal.   Nursing note and vitals reviewed.      Assessment:       1. Sinusitis, unspecified chronicity, unspecified location    2. Sore throat    3. Myalgia    4. Cough    5. PND (post-nasal drip)    6. Antibiotic-induced yeast infection        Plan:   Sinusitis, unspecified chronicity, unspecified location  -     amoxicillin-clavulanate 875-125mg  (AUGMENTIN) 875-125 mg per tablet; Take 1 tablet by mouth 2 (two) times daily. for 10 days  Dispense: 20 tablet; Refill: 0  -     betamethasone acetate-betamethasone sodium phosphate injection 9 mg    Sore throat  -     POCT RAPID STREP A  -     Strep A culture, throat  -     benzonatate (TESSALON) 200 MG capsule; Take 1 capsule (200 mg total) by mouth 3 (three) times daily as needed for Cough.  Dispense: 30 capsule; Refill: 0  -     cetirizine (ZYRTEC) 10 MG tablet; Take 1 tablet (10 mg total) by mouth once daily.  Dispense: 30 tablet; Refill: 0  -     betamethasone acetate-betamethasone sodium phosphate injection 9 mg    Myalgia  -     POCT RAPID STREP A  -     Strep A culture, throat  -     POCT Influenza A/B Molecular    Cough  -     POCT Influenza A/B Molecular  -     benzonatate (TESSALON) 200 MG capsule; Take 1 capsule (200 mg total) by mouth 3 (three) times daily as needed for Cough.  Dispense: 30 capsule; Refill: 0  -     fluticasone (FLONASE) 50 mcg/actuation nasal spray; 2 sprays (100 mcg total) by Each Nare route once daily.  Dispense: 16 g; Refill: 0    PND (post-nasal drip)  -     cetirizine (ZYRTEC) 10 MG tablet; Take 1 tablet (10 mg total) by mouth once daily.  Dispense: 30 tablet; Refill: 0    Antibiotic-induced yeast infection  -     fluconazole (DIFLUCAN) 150 MG Tab; Take 1 tablet (150 mg total) by mouth once. May repeat after 72 hours if symptoms persist for 1 dose  Dispense: 2 tablet; Refill: 0      Patient informed of potential side effects of steroid injection including elevating blood pressure, increased blood glucose levels, increased risk of opportunistic infections, peptic ulcer disease and GI bleeding, insomnia, tremors, suppression of ones own steroid production, avascular necrosis, osteoporosis, increased intraocular pressure, etc. Patient verbalizes risk/benefit profile and agrees to steroid injection.     · Rest and increase fluids.   · May apply warm compresses as needed.   · Saline  nasal spray or saline irrigation (Neti pot) to loosen nasal congestion.  · Flonase or Nasacort to reduce inflammation in the sinus cavities.  · Take antibiotics exactly as prescribed. Make sure to complete the entire course of antibiotics even if you start feeling better. This will prevent recurrence of your infection and bacterial resistance.   · Do not drive, drink alcohol, or take any other sedating medications or substances while taking cough syrup.   · Follow up with your primary care provider or with ENT if not improved within a few days or sooner for any new or worsening symptoms.   · Go to the ER for any fever that does not improve with Tylenol/Ibuprofen, neck stiffness, rash, severe headache, vision changes, shortness of breath, chest pain, severe facial pain or swelling, or for any other new and concerning symptoms.

## 2019-02-26 NOTE — PATIENT INSTRUCTIONS
Sinusitis (Antibiotic Treatment)    The sinuses are air-filled spaces within the bones of the face. They connect to the inside of the nose. Sinusitis is an inflammation of the tissue lining the sinus cavity. Sinus inflammation can occur during a cold. It can also be due to allergies to pollens and other particles in the air. Sinusitis can cause symptoms of sinus congestion and fullness. A sinus infection causes fever, headache and facial pain. There is often green or yellow drainage from the nose or into the back of the throat (post-nasal drip). You have been given antibiotics to treat this condition.  Home care:  · Take the full course of antibiotics as instructed. Do not stop taking them, even if you feel better.  · Drink plenty of water, hot tea, and other liquids. This may help thin mucus. It also may promote sinus drainage.  · Heat may help soothe painful areas of the face. Use a towel soaked in hot water. Or,  the shower and direct the hot spray onto your face. Using a vaporizer along with a menthol rub at night may also help.   · An expectorant containing guaifenesin may help thin the mucus and promote drainage from the sinuses.  · Over-the-counter decongestants may be used unless a similar medicine was prescribed. Nasal sprays work the fastest. Use one that contains phenylephrine or oxymetazoline. First blow the nose gently. Then use the spray. Do not use these medicines more often than directed on the label or symptoms may get worse. You may also use tablets containing pseudoephedrine. Avoid products that combine ingredients, because side effects may be increased. Read labels. You can also ask the pharmacist for help. (NOTE: Persons with high blood pressure should not use decongestants. They can raise blood pressure.)  · Over-the-counter antihistamines may help if allergies contributed to your sinusitis.    · Do not use nasal rinses or irrigation during an acute sinus infection, unless told to by  your health care provider. Rinsing may spread the infection to other sinuses.  · Use acetaminophen or ibuprofen to control pain, unless another pain medicine was prescribed. (If you have chronic liver or kidney disease or ever had a stomach ulcer, talk with your doctor before using these medicines. Aspirin should never be used in anyone under 18 years of age who is ill with a fever. It may cause severe liver damage.)  · Don't smoke. This can worsen symptoms.  Follow-up care  Follow up with your healthcare provider or our staff if you are not improving within the next week.  When to seek medical advice  Call your healthcare provider if any of these occur:  · Facial pain or headache becoming more severe  · Stiff neck  · Unusual drowsiness or confusion  · Swelling of the forehead or eyelids  · Vision problems, including blurred or double vision  · Fever of 100.4ºF (38ºC) or higher, or as directed by your healthcare provider  · Seizure  · Breathing problems  · Symptoms not resolving within 10 days  Date Last Reviewed: 4/13/2015  © 4438-5868 [email or web address]. [address] [place] [address], [place] [address]. All rights reserved. This information is not intended as a substitute for professional medical care. Always follow your healthcare professional's instructions.

## 2019-03-02 LAB — BACTERIA THROAT CULT: NORMAL

## 2019-03-22 ENCOUNTER — OFFICE VISIT (OUTPATIENT)
Dept: URGENT CARE | Facility: CLINIC | Age: 59
End: 2019-03-22
Payer: COMMERCIAL

## 2019-03-22 VITALS
OXYGEN SATURATION: 98 % | HEART RATE: 75 BPM | WEIGHT: 216.25 LBS | BODY MASS INDEX: 36.03 KG/M2 | HEIGHT: 65 IN | SYSTOLIC BLOOD PRESSURE: 142 MMHG | DIASTOLIC BLOOD PRESSURE: 102 MMHG | TEMPERATURE: 99 F | RESPIRATION RATE: 16 BRPM

## 2019-03-22 DIAGNOSIS — E07.9 THYROID DISEASE: ICD-10-CM

## 2019-03-22 DIAGNOSIS — R07.0 THROAT BURNING: ICD-10-CM

## 2019-03-22 DIAGNOSIS — R59.0 SUBMANDIBULAR LYMPHADENOPATHY: Primary | ICD-10-CM

## 2019-03-22 LAB
CTP QC/QA: YES
S PYO RRNA THROAT QL PROBE: NEGATIVE

## 2019-03-22 PROCEDURE — 87880 STREP A ASSAY W/OPTIC: CPT | Mod: QW,S$GLB,, | Performed by: FAMILY MEDICINE

## 2019-03-22 PROCEDURE — 99213 PR OFFICE/OUTPT VISIT, EST, LEVL III, 20-29 MIN: ICD-10-PCS | Mod: S$GLB,,, | Performed by: FAMILY MEDICINE

## 2019-03-22 PROCEDURE — 99999 PR PBB SHADOW E&M-EST. PATIENT-LVL III: ICD-10-PCS | Mod: PBBFAC,,, | Performed by: FAMILY MEDICINE

## 2019-03-22 PROCEDURE — 87880 POCT RAPID STREP A: ICD-10-PCS | Mod: QW,S$GLB,, | Performed by: FAMILY MEDICINE

## 2019-03-22 PROCEDURE — 3008F BODY MASS INDEX DOCD: CPT | Mod: CPTII,S$GLB,, | Performed by: FAMILY MEDICINE

## 2019-03-22 PROCEDURE — 99999 PR PBB SHADOW E&M-EST. PATIENT-LVL III: CPT | Mod: PBBFAC,,, | Performed by: FAMILY MEDICINE

## 2019-03-22 PROCEDURE — 3008F PR BODY MASS INDEX (BMI) DOCUMENTED: ICD-10-PCS | Mod: CPTII,S$GLB,, | Performed by: FAMILY MEDICINE

## 2019-03-22 PROCEDURE — 87081 CULTURE SCREEN ONLY: CPT

## 2019-03-22 PROCEDURE — 99213 OFFICE O/P EST LOW 20 MIN: CPT | Mod: S$GLB,,, | Performed by: FAMILY MEDICINE

## 2019-03-22 PROCEDURE — 87147 CULTURE TYPE IMMUNOLOGIC: CPT

## 2019-03-22 RX ORDER — ACETAMINOPHEN 500 MG
500 TABLET ORAL
COMMUNITY
End: 2019-03-28

## 2019-03-22 RX ORDER — ASPIRIN 325 MG
650 TABLET, DELAYED RELEASE (ENTERIC COATED) ORAL
COMMUNITY
End: 2019-03-28

## 2019-03-22 RX ORDER — FLUCONAZOLE 150 MG/1
150 TABLET ORAL
COMMUNITY
End: 2019-03-28

## 2019-03-22 NOTE — PROGRESS NOTES
Subjective:       Patient ID: Mary Patterson is a 59 y.o. female.    Chief Complaint: Sore Throat and Hoarse (dry mouth)    Pt has had steroids recently, then had augmentin for 10d.  Complaining of burning throat and LA.      URI    This is a new problem. Episode onset: 2 months. The problem has been gradually worsening. There has been no fever. Associated symptoms include a sore throat and swollen glands. Pertinent negatives include no abdominal pain, chest pain, congestion, coughing, headaches, rhinorrhea, sinus pain, sneezing, vomiting or wheezing. Treatments tried: steroids and abx. The treatment provided no relief.     Review of Systems   HENT: Positive for sore throat. Negative for congestion, rhinorrhea, sinus pain and sneezing.    Respiratory: Negative for cough and wheezing.    Cardiovascular: Negative for chest pain.   Gastrointestinal: Negative for abdominal pain and vomiting.   Neurological: Negative for headaches.       Objective:      Physical Exam   Constitutional: She appears well-developed and well-nourished. She appears distressed.   HENT:   Head: Normocephalic and atraumatic.   Nose: Nose normal.   Mouth/Throat: Posterior oropharyngeal erythema present. No oropharyngeal exudate.   Neck:   Submandibular LA   Pulmonary/Chest: Effort normal and breath sounds normal. No respiratory distress. She has no wheezes.   Lymphadenopathy:     She has no cervical adenopathy.   Skin: Skin is warm and dry. No rash noted. She is not diaphoretic. No erythema.   Psychiatric: Her mood appears anxious.   Nursing note and vitals reviewed.      Assessment:       1. Submandibular lymphadenopathy    2. Throat burning    3. Thyroid disease        Plan:     Problem List Items Addressed This Visit        ENT    Throat burning       Endocrine    Thyroid disease    Relevant Orders    TSH       Other    Submandibular lymphadenopathy - Primary    Relevant Orders    CBC auto differential    Comprehensive metabolic panel     HETEROPHILE AB SCREEN    Ambulatory Referral to ENT    POCT rapid strep A (Completed)    Strep A culture, throat

## 2019-03-25 LAB — BACTERIA THROAT CULT: NORMAL

## 2019-03-28 ENCOUNTER — OFFICE VISIT (OUTPATIENT)
Dept: OTOLARYNGOLOGY | Facility: CLINIC | Age: 59
End: 2019-03-28
Payer: COMMERCIAL

## 2019-03-28 VITALS
HEART RATE: 81 BPM | BODY MASS INDEX: 35.66 KG/M2 | TEMPERATURE: 99 F | WEIGHT: 214.31 LBS | DIASTOLIC BLOOD PRESSURE: 87 MMHG | SYSTOLIC BLOOD PRESSURE: 145 MMHG

## 2019-03-28 DIAGNOSIS — E04.9 ENLARGED THYROID: ICD-10-CM

## 2019-03-28 DIAGNOSIS — R79.89 ABNORMAL TSH: Primary | ICD-10-CM

## 2019-03-28 PROCEDURE — 31575 PR LARYNGOSCOPY, FLEXIBLE; DIAGNOSTIC: ICD-10-PCS | Mod: S$GLB,,, | Performed by: OTOLARYNGOLOGY

## 2019-03-28 PROCEDURE — 99999 PR PBB SHADOW E&M-EST. PATIENT-LVL III: CPT | Mod: PBBFAC,,, | Performed by: OTOLARYNGOLOGY

## 2019-03-28 PROCEDURE — 99999 PR PBB SHADOW E&M-EST. PATIENT-LVL III: ICD-10-PCS | Mod: PBBFAC,,, | Performed by: OTOLARYNGOLOGY

## 2019-03-28 PROCEDURE — 99244 PR OFFICE CONSULTATION,LEVEL IV: ICD-10-PCS | Mod: 25,S$GLB,, | Performed by: OTOLARYNGOLOGY

## 2019-03-28 PROCEDURE — 99244 OFF/OP CNSLTJ NEW/EST MOD 40: CPT | Mod: 25,S$GLB,, | Performed by: OTOLARYNGOLOGY

## 2019-03-28 PROCEDURE — 31575 DIAGNOSTIC LARYNGOSCOPY: CPT | Mod: S$GLB,,, | Performed by: OTOLARYNGOLOGY

## 2019-03-28 RX ORDER — CLINDAMYCIN HYDROCHLORIDE 300 MG/1
300 CAPSULE ORAL EVERY 8 HOURS
Qty: 30 CAPSULE | Refills: 0 | Status: SHIPPED | OUTPATIENT
Start: 2019-03-28 | End: 2019-04-07

## 2019-03-28 RX ORDER — FLUCONAZOLE 150 MG/1
150 TABLET ORAL DAILY
Qty: 1 TABLET | Refills: 0 | Status: SHIPPED | OUTPATIENT
Start: 2019-03-28 | End: 2019-03-29

## 2019-03-28 RX ORDER — LEVOCETIRIZINE DIHYDROCHLORIDE 5 MG/1
5 TABLET, FILM COATED ORAL NIGHTLY
Qty: 30 TABLET | Refills: 11 | Status: SHIPPED | OUTPATIENT
Start: 2019-03-28 | End: 2019-11-25

## 2019-03-28 NOTE — LETTER
April 1, 2019      Sravan Vargas MD  [address] [address] [address]           [facility]  [address] [facility] [address]  Phone: [phone]  Fax: [phone]          Patient: Mary Patterson   MR Number: [id number]   YOB: 1960   Date of Visit: 3/28/2019       Dear Dr. Sravan Vargas:    Thank you for referring Mary Patterson to me for evaluation. Attached you will find relevant portions of my assessment and plan of care.    If you have questions, please do not hesitate to call me. I look forward to following Mary Patterson along with you.    Sincerely,    Zion Andrews MD    Enclosure  CC:  No Recipients    If you would like to receive this communication electronically, please contact [email or web address] or [phone] to request more information on [employer] Link access.    For providers and/or their staff who would like to refer a patient to [facility], please contact us through our one-stop-shop provider referral line, [employer] , at [phone].    If you feel you have received this communication in error or would no longer like to receive these types of communications, please e-mail [email or web address]

## 2019-03-29 ENCOUNTER — PATIENT MESSAGE (OUTPATIENT)
Dept: OTOLARYNGOLOGY | Facility: CLINIC | Age: 59
End: 2019-03-29

## 2019-03-29 ENCOUNTER — HOSPITAL ENCOUNTER (OUTPATIENT)
Dept: RADIOLOGY | Facility: HOSPITAL | Age: 59
Discharge: HOME | End: 2019-03-29
Attending: PHYSICIAN ASSISTANT
Payer: COMMERCIAL

## 2019-03-29 DIAGNOSIS — E04.9 ENLARGED THYROID: ICD-10-CM

## 2019-03-29 DIAGNOSIS — R79.89 ABNORMAL TSH: ICD-10-CM

## 2019-03-29 PROCEDURE — 76536 US SOFT TISSUE HEAD NECK THYROID: ICD-10-PCS | Mod: 26,,, | Performed by: RADIOLOGY

## 2019-03-29 PROCEDURE — 76536 US EXAM OF HEAD AND NECK: CPT | Mod: TC

## 2019-03-29 PROCEDURE — 76536 US EXAM OF HEAD AND NECK: CPT | Mod: 26,,, | Performed by: RADIOLOGY

## 2019-03-29 NOTE — PROGRESS NOTES
"Referring Provider:    Sravan Vargas Md  [address] [address]  Subjective:   Patient: Mary Patterson [id number], :1960   Visit date:3/28/2019 1:09 PM    Chief Complaint:  Other (pt was referred by Dr Vagras. Her ssx started after she recieved a flu shot on 2019 the morning after.)    HPI:  Mary is a 59 y.o. female who I was asked to see in consultation for evaluation of the following issue(s):     Patient first presented to clinic on 19 referred by her PCP, Dr. Vargas, for submandibular gland swelling. She was seen in  in February for sinusitis and tx with Augmentin and oral steroid. Pt reported this is when the swelling along bilateral cheeks and neck started and at the time was so severe she felt as though she could not swallow. After completing abx, swelling improved significantly but she feels she is still swollen under her chin/ and sides of her neck. She denied dry mouth, denied salivary stones. W/u with labs and Flu/strep swabs were negative. Pt is an  and c/o weakened and horse voice x 2-3 weeks. Denied dysphagia. Denied any pain in her mouth or neck. No fever(s). No cough. No GERD. 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 She has never smoked. No other relieving factors.     Patient also c/o hx of thyroid "issues", stated last US was around :    COMPRESSIVE SYMPTOMS OR MINOR RISK FACTORS FOR THYROID CANCER:  Increasing in size over the past 6 months:  unknown  Dysphagia: No  Dyspnea on exertion:  No  Orthopnea:  No  Hemoptysis:  No  Voice changes:  Yes  Pain:  No  AGE >45  Yes   FEMALE Yes    HIGH RISK HISTORY FOR THYROID CANCER:  Thyroid cancer in 1 or more first degree relatives:  No  History of radiation:  No  Prior thyroidectomy with dx of thyroid cancer:  No  PET positive nodule:  No  Multiple Endocrine Neoplasia:  No  Familial Medullary Thyroid Cancer:  No    (2009 REVISED AMERICAN THYROID " ASSOCIATION MANAGEMENT GUIDELINES FOR PATIENTS WITH THYROID NODULES AND DIFFERENTIATED THYROID CANCER)      TSH: decreased      Review of Systems:  -     Allergic/Immunologic: has No Known Allergies..  -     Constitutional: Current temp: 98.7 °F (37.1 °C) (Tympanic)      Her meds, allergies, medical, surgical, social & family histories were reviewed & updated:  -     She has a current medication list which includes the following prescription(s): blood sugar diagnostic, cholecalciferol (vitamin d3), ferrous sulfate, levothyroxine, clindamycin, fluconazole, and levocetirizine.  -     She  has a past medical history of Diabetes mellitus, type 2, Obesity, Thyroid disease, and Type II or unspecified type diabetes mellitus without mention of complication, not stated as uncontrolled.   -     She does not have any pertinent problems on file.   -     She  has a past surgical history that includes Hysterectomy; Bladder suspension; Cholecystectomy; and Knee surgery (Right).  -     She  reports that she has never smoked. Her smokeless tobacco use includes chew. She reports that she does not drink alcohol or use drugs.  -     Her family history includes Diabetes in her father, paternal grandfather, and paternal uncle; Obesity in her father, paternal grandfather, and sister; Rheumatologic disease in her sister.  -     She has No Known Allergies.    Objective:     Physical Exam:  Vitals:  BP (!) 145/87   Pulse 81   Temp 98.7 °F (37.1 °C) (Tympanic)   Wt 97.2 kg (214 lb 4.6 oz)   BMI 35.66 kg/m²   General appearance:  Well developed, well nourished    Eyes:  Extraocular motions intact, PERRL    Communication:  no hoarseness, no dysphonia    Ears:  Otoscopy of external auditory canals and tympanic membranes was normal, clinical speech reception thresholds grossly intact, no mass/lesion of auricle.  Nose:  No masses/lesions of external nose, nasal mucosa, septum, and turbinates were within normal limits.  Mouth:  No mass/lesion  of lips, teeth, gums, hard/soft palate, tongue, tonsils, or oropharynx.    Cardiovascular:  No pedal edema; Radial Pulses +2     Neck & Lymphatics:  No cervical lymphadenopathy, no neck mass/crepitus/ asymmetry, trachea is midline, no thyroid enlargement/tenderness/mass. Mild-moderate swelling along bilat submandibular glands, no mass, no warmth. Mild TTP.     Psych: Oriented x3,  Alert with normal mood and affect.     Respiration/Chest:  Symmetric expansion during respiration, normal respiratory effort.    Skin:  Warm and intact. No ulcerations of face, scalp, neck.      Assessment & Plan:   Abnormal TSH  -     US Soft Tissue Head Neck Thyroid; Future; Expected date: 2019    Enlarged thyroid  -     US Soft Tissue Head Neck Thyroid; Future; Expected date: 2019    Other orders  -     clindamycin (CLEOCIN) 300 MG capsule; Take 1 capsule (300 mg total) by mouth every 8 (eight) hours. for 10 days  Dispense: 30 capsule; Refill: 0  -     levocetirizine (XYZAL) 5 MG tablet; Take 1 tablet (5 mg total) by mouth every evening.  Dispense: 30 tablet; Refill: 11  -     fluconazole (DIFLUCAN) 150 MG Tab; Take 1 tablet (150 mg total) by mouth once daily. for 1 day  Dispense: 1 tablet; Refill: 0    Clindamycin x 10 days - advised taking probiotic with abx.   Laryngoscopic exam insignificant.   US Thyroid    We discussed her medical conditions, treatments and plan.  Mary should return to clinic if any issues arise (symptoms worsen or persist), otherwise we will see her back in the clinic In 3 weeks.      Thank you for allowing me to participate in the care of Mary.    Zion Andrews MD      Patient: Mary Patterson [id number], :1960  Procedure date:3/28/2019  Patient's medications, allergies, past medical, surgical, social and family histories were reviewed and updated as appropriate.  Chief Complaint:  Other (pt was referred by Dr Vargas. Her ssx started after she recieved a flu shot on 2019 the morning  after.)    HPI:  Mary is a 59 y.o. female with the history of present illness as discussed in the clinic note from today.    Procedure: Risks, benefits, and alternatives of the procedure were discussed with the patient, and the patient consented to the fiberoptic examination.  We applied a topical nasal decongestant and analgesic.  After adequate anesthesia was obtained, the flexible fiberoptic scope was passed into each nostril independently.  Each nasal cavity, the entire pharynx (nasopharynx to hypopharynx) and the larynx were visualized. At the end of the examination, the scope was removed. The patient tolerated the procedure well with no complications.     Findings:  -     Laryngeal mucosa is normal  -     Posterior commissure has no hypertrophy  -     Lingual tonsils have no hypertrophy  -     Adenoids have no  hypertrophy  -     Right vocal fold: normal mobility     mass/lesion: none  -     Left vocal fold: normal mobility     mass/lesion: none  -     Other findings: none    Assessment & Plan:  - see today's clinic note

## 2019-04-18 RX ORDER — LEVOTHYROXINE SODIUM 175 UG/1
TABLET ORAL
Qty: 90 TABLET | Refills: 0 | Status: SHIPPED | OUTPATIENT
Start: 2019-04-18 | End: 2019-07-22 | Stop reason: SDUPTHER

## 2019-04-18 RX ORDER — LEVOTHYROXINE SODIUM 175 UG/1
175 TABLET ORAL DAILY
Qty: 90 TABLET | Refills: 0 | OUTPATIENT
Start: 2019-04-18 | End: 2020-04-17

## 2019-04-18 NOTE — TELEPHONE ENCOUNTER
----- Message from Carlos Hess sent at 4/18/2019  9:04 AM CDT -----  Contact: pt   Type:  RX Refill Request    Who Called: BELKISGLORIA U   Refill or New Rx: refill   RX Name and Strength:  Levothyroxine 175 MCG   How is the patient currently taking it? (ex. 1XDay):  1 daily  Is this a 30 day or 90 day RX: pt wants a 6 month supply   Preferred Pharmacy with phone number:     [id number] [place] [address] [facility]  [address] [facility]  [place] [address]  Phone: [phone] Fax: [phone]    Local or Mail Order: local   Ordering Provider: love   Would the patient rather a call back or a response via [facility]? Call   Best Call Back Number: [phone]  Additional Information:

## 2019-04-22 ENCOUNTER — TELEPHONE (OUTPATIENT)
Dept: INTERNAL MEDICINE | Facility: CLINIC | Age: 59
End: 2019-04-22

## 2019-04-22 NOTE — TELEPHONE ENCOUNTER
----- Message from Charles José sent at 4/22/2019  9:01 AM CDT -----  Contact: [phone]   Pt would like refills called in on rx medication Levothyroxine 175 mg.  Pharmacy waiting on authorization to be sent, pt is completely out of medication, please call back at [phone]. x-

## 2019-04-22 NOTE — TELEPHONE ENCOUNTER
----- Message from Charles José sent at 4/22/2019  9:01 AM CDT -----  Contact: [phone]   Pt would like refills called in on rx medication Levothyroxine 175 mg.  Pharmacy waiting on authorization to be sent, pt is completely out of medication, please call back at [phone]. x-

## 2019-06-07 ENCOUNTER — PATIENT OUTREACH (OUTPATIENT)
Dept: ADMINISTRATIVE | Facility: HOSPITAL | Age: 59
End: 2019-06-07

## 2019-07-22 RX ORDER — LEVOTHYROXINE SODIUM 175 UG/1
175 TABLET ORAL DAILY
Qty: 90 TABLET | Refills: 0 | OUTPATIENT
Start: 2019-07-22

## 2019-07-22 RX ORDER — LEVOTHYROXINE SODIUM 175 UG/1
TABLET ORAL
Qty: 90 TABLET | Refills: 0 | Status: SHIPPED | OUTPATIENT
Start: 2019-07-22 | End: 2019-10-18 | Stop reason: SDUPTHER

## 2019-07-22 NOTE — TELEPHONE ENCOUNTER
----- Message from Kelsey White sent at 7/22/2019  1:15 PM CDT -----  Contact: pt  She's calling in regards to meds refill ;levothyroxine (SYNTHROID, LEVOTHROID) 175 MCG tablet    [id number] - [place] - [address]   [address] [place] [address]  Phone: [phone] Fax: [phone]        pls call pt back at [phone] (home)

## 2019-07-23 ENCOUNTER — TELEPHONE (OUTPATIENT)
Dept: INTERNAL MEDICINE | Facility: CLINIC | Age: 59
End: 2019-07-23

## 2019-07-23 DIAGNOSIS — E55.9 VITAMIN D DEFICIENCY: ICD-10-CM

## 2019-07-23 DIAGNOSIS — E11.9 TYPE 2 DIABETES MELLITUS WITHOUT COMPLICATION, WITHOUT LONG-TERM CURRENT USE OF INSULIN: Primary | ICD-10-CM

## 2019-07-23 DIAGNOSIS — E03.9 HYPOTHYROIDISM, UNSPECIFIED TYPE: ICD-10-CM

## 2019-07-23 DIAGNOSIS — E66.01 CLASS 2 SEVERE OBESITY DUE TO EXCESS CALORIES WITH SERIOUS COMORBIDITY AND BODY MASS INDEX (BMI) OF 36.0 TO 36.9 IN ADULT: ICD-10-CM

## 2019-07-23 DIAGNOSIS — E61.1 IRON DEFICIENCY: ICD-10-CM

## 2019-07-23 DIAGNOSIS — E07.9 THYROID DISEASE: ICD-10-CM

## 2019-07-23 DIAGNOSIS — Z00.00 ROUTINE GENERAL MEDICAL EXAMINATION AT A HEALTH CARE FACILITY: ICD-10-CM

## 2019-07-23 NOTE — TELEPHONE ENCOUNTER
----- Message from Mira Dunbar sent at 2019 10:04 AM CDT -----  Contact: self [phone]  Pt would like return call from nurse regarding labs, pt would like to know if labs are needed for upcoming appt on10/01/19;, labs on file  19.  Please call back at [phone].  Md Corona

## 2019-09-27 ENCOUNTER — TELEPHONE (OUTPATIENT)
Dept: INTERNAL MEDICINE | Facility: CLINIC | Age: 59
End: 2019-09-27

## 2019-09-27 NOTE — TELEPHONE ENCOUNTER
----- Message from Kristin Ramachandran sent at 9/27/2019 12:23 PM CDT -----  Contact: pt  Type:  Sooner Apoointment Request    Caller is requesting a sooner appointment.  Caller declined first available appointment listed below.  Caller will not accept being placed on the waitlist and is requesting a message be sent to doctor.  Name of Caller:Patient  When is the first available appointment?12/2019  Symptoms:follow up for medication, pt will be out of meds before next available appt,  Would the patient rather a call back or a response via [facility]? Call back  Best Call Back Number:[phone]  Additional Information: pt will be off for break on 11/25thru 11/29 or 10/, pt is a teacher

## 2019-09-27 NOTE — TELEPHONE ENCOUNTER
I returned call and left detailed vm for patient to confirm farzad 10/1/19 as well as added her to wait list and see which meds she needed.wilmar

## 2019-10-15 ENCOUNTER — LAB VISIT (OUTPATIENT)
Dept: LAB | Facility: HOSPITAL | Age: 59
End: 2019-10-15
Attending: FAMILY MEDICINE
Payer: COMMERCIAL

## 2019-10-15 DIAGNOSIS — E55.9 VITAMIN D DEFICIENCY: ICD-10-CM

## 2019-10-15 DIAGNOSIS — Z00.00 ROUTINE GENERAL MEDICAL EXAMINATION AT A HEALTH CARE FACILITY: ICD-10-CM

## 2019-10-15 DIAGNOSIS — E07.9 THYROID DISEASE: ICD-10-CM

## 2019-10-15 DIAGNOSIS — E03.9 HYPOTHYROIDISM, UNSPECIFIED TYPE: ICD-10-CM

## 2019-10-15 DIAGNOSIS — E66.01 CLASS 2 SEVERE OBESITY DUE TO EXCESS CALORIES WITH SERIOUS COMORBIDITY AND BODY MASS INDEX (BMI) OF 36.0 TO 36.9 IN ADULT: ICD-10-CM

## 2019-10-15 DIAGNOSIS — E61.1 IRON DEFICIENCY: ICD-10-CM

## 2019-10-15 DIAGNOSIS — E11.9 TYPE 2 DIABETES MELLITUS WITHOUT COMPLICATION, WITHOUT LONG-TERM CURRENT USE OF INSULIN: ICD-10-CM

## 2019-10-15 LAB — 25(OH)D3+25(OH)D2 SERPL-MCNC: 35 NG/ML (ref 30–96)

## 2019-10-15 PROCEDURE — 83540 ASSAY OF IRON: CPT

## 2019-10-15 PROCEDURE — 36415 COLL VENOUS BLD VENIPUNCTURE: CPT | Mod: PO

## 2019-10-15 PROCEDURE — 80061 LIPID PANEL: CPT

## 2019-10-15 PROCEDURE — 84439 ASSAY OF FREE THYROXINE: CPT

## 2019-10-15 PROCEDURE — 84443 ASSAY THYROID STIM HORMONE: CPT

## 2019-10-15 PROCEDURE — 82306 VITAMIN D 25 HYDROXY: CPT

## 2019-10-16 LAB
CHOLEST SERPL-MCNC: 165 MG/DL (ref 120–199)
CHOLEST/HDLC SERPL: 4.7 {RATIO} (ref 2–5)
HDLC SERPL-MCNC: 35 MG/DL (ref 40–75)
HDLC SERPL: 21.2 % (ref 20–50)
IRON SERPL-MCNC: 86 UG/DL (ref 30–160)
LDLC SERPL CALC-MCNC: 95 MG/DL (ref 63–159)
NONHDLC SERPL-MCNC: 130 MG/DL
SATURATED IRON: 28 % (ref 20–50)
T4 FREE SERPL-MCNC: 1.41 NG/DL (ref 0.71–1.51)
TOTAL IRON BINDING CAPACITY: 309 UG/DL (ref 250–450)
TRANSFERRIN SERPL-MCNC: 209 MG/DL (ref 200–375)
TRIGL SERPL-MCNC: 175 MG/DL (ref 30–150)
TSH SERPL DL<=0.005 MIU/L-ACNC: 0.12 UIU/ML (ref 0.4–4)

## 2019-10-17 NOTE — PROGRESS NOTES
Please schedule ov to review labs, as appt was never made for routine followup. Also there is no a1c so I would like to get that added on as well. A1c, cmet, and cbc was ordered but not drawn. Can we find out why please? Thanks. Eun Farmer MD

## 2019-10-18 RX ORDER — LEVOTHYROXINE SODIUM 175 UG/1
TABLET ORAL
Qty: 90 TABLET | Refills: 0 | Status: SHIPPED | OUTPATIENT
Start: 2019-10-18 | End: 2019-11-25 | Stop reason: SDUPTHER

## 2019-10-18 NOTE — TELEPHONE ENCOUNTER
----- Message from Camila Banks sent at 10/18/2019 12:08 PM CDT -----  Contact: Pt   PT needs refill on prescription     levothyroxine (SYNTHROID, LEVOTHROID) 175 MCG tablet    She needs this done today because she is going out of town so she needs to bring it with her.     [facility] .    Please contact PT at [phone]    Thanks

## 2019-11-25 ENCOUNTER — OFFICE VISIT (OUTPATIENT)
Dept: INTERNAL MEDICINE | Facility: CLINIC | Age: 59
End: 2019-11-25
Payer: COMMERCIAL

## 2019-11-25 ENCOUNTER — LAB VISIT (OUTPATIENT)
Dept: LAB | Facility: HOSPITAL | Age: 59
End: 2019-11-25
Attending: FAMILY MEDICINE
Payer: COMMERCIAL

## 2019-11-25 VITALS
HEART RATE: 62 BPM | BODY MASS INDEX: 35.63 KG/M2 | HEIGHT: 65 IN | WEIGHT: 213.88 LBS | DIASTOLIC BLOOD PRESSURE: 84 MMHG | SYSTOLIC BLOOD PRESSURE: 126 MMHG

## 2019-11-25 DIAGNOSIS — E78.5 HYPERLIPIDEMIA ASSOCIATED WITH TYPE 2 DIABETES MELLITUS: ICD-10-CM

## 2019-11-25 DIAGNOSIS — E11.69 HYPERLIPIDEMIA ASSOCIATED WITH TYPE 2 DIABETES MELLITUS: ICD-10-CM

## 2019-11-25 DIAGNOSIS — Z00.00 ROUTINE GENERAL MEDICAL EXAMINATION AT A HEALTH CARE FACILITY: Primary | ICD-10-CM

## 2019-11-25 DIAGNOSIS — E11.65 UNCONTROLLED TYPE 2 DIABETES MELLITUS WITH HYPERGLYCEMIA, WITHOUT LONG-TERM CURRENT USE OF INSULIN: ICD-10-CM

## 2019-11-25 DIAGNOSIS — E03.9 HYPOTHYROIDISM, UNSPECIFIED TYPE: ICD-10-CM

## 2019-11-25 DIAGNOSIS — Z12.31 ENCOUNTER FOR SCREENING MAMMOGRAM FOR BREAST CANCER: ICD-10-CM

## 2019-11-25 LAB
ALBUMIN SERPL BCP-MCNC: 3.9 G/DL (ref 3.5–5.2)
ALP SERPL-CCNC: 60 U/L (ref 55–135)
ALT SERPL W/O P-5'-P-CCNC: 19 U/L (ref 10–44)
ANION GAP SERPL CALC-SCNC: 7 MMOL/L (ref 8–16)
AST SERPL-CCNC: 20 U/L (ref 10–40)
BASOPHILS # BLD AUTO: 0.03 K/UL (ref 0–0.2)
BASOPHILS NFR BLD: 0.4 % (ref 0–1.9)
BILIRUB SERPL-MCNC: 0.6 MG/DL (ref 0.1–1)
BUN SERPL-MCNC: 11 MG/DL (ref 6–20)
CALCIUM SERPL-MCNC: 9.5 MG/DL (ref 8.7–10.5)
CHLORIDE SERPL-SCNC: 105 MMOL/L (ref 95–110)
CO2 SERPL-SCNC: 29 MMOL/L (ref 23–29)
CREAT SERPL-MCNC: 0.8 MG/DL (ref 0.5–1.4)
DIFFERENTIAL METHOD: ABNORMAL
EOSINOPHIL # BLD AUTO: 0.3 K/UL (ref 0–0.5)
EOSINOPHIL NFR BLD: 3.6 % (ref 0–8)
ERYTHROCYTE [DISTWIDTH] IN BLOOD BY AUTOMATED COUNT: 12.5 % (ref 11.5–14.5)
EST. GFR  (AFRICAN AMERICAN): >60 ML/MIN/1.73 M^2
EST. GFR  (NON AFRICAN AMERICAN): >60 ML/MIN/1.73 M^2
GLUCOSE SERPL-MCNC: 166 MG/DL (ref 70–110)
HCT VFR BLD AUTO: 41.9 % (ref 37–48.5)
HGB BLD-MCNC: 13.1 G/DL (ref 12–16)
IMM GRANULOCYTES # BLD AUTO: 0.02 K/UL (ref 0–0.04)
IMM GRANULOCYTES NFR BLD AUTO: 0.3 % (ref 0–0.5)
LYMPHOCYTES # BLD AUTO: 2.2 K/UL (ref 1–4.8)
LYMPHOCYTES NFR BLD: 27.9 % (ref 18–48)
MCH RBC QN AUTO: 28.7 PG (ref 27–31)
MCHC RBC AUTO-ENTMCNC: 31.3 G/DL (ref 32–36)
MCV RBC AUTO: 92 FL (ref 82–98)
MONOCYTES # BLD AUTO: 0.4 K/UL (ref 0.3–1)
MONOCYTES NFR BLD: 5.3 % (ref 4–15)
NEUTROPHILS # BLD AUTO: 5 K/UL (ref 1.8–7.7)
NEUTROPHILS NFR BLD: 62.5 % (ref 38–73)
NRBC BLD-RTO: 0 /100 WBC
PLATELET # BLD AUTO: 192 K/UL (ref 150–350)
PMV BLD AUTO: 13.8 FL (ref 9.2–12.9)
POTASSIUM SERPL-SCNC: 4.3 MMOL/L (ref 3.5–5.1)
PROT SERPL-MCNC: 7.5 G/DL (ref 6–8.4)
RBC # BLD AUTO: 4.57 M/UL (ref 4–5.4)
SODIUM SERPL-SCNC: 141 MMOL/L (ref 136–145)
WBC # BLD AUTO: 7.95 K/UL (ref 3.9–12.7)

## 2019-11-25 PROCEDURE — 99396 PREV VISIT EST AGE 40-64: CPT | Mod: S$GLB,,, | Performed by: FAMILY MEDICINE

## 2019-11-25 PROCEDURE — 80053 COMPREHEN METABOLIC PANEL: CPT

## 2019-11-25 PROCEDURE — 83036 HEMOGLOBIN GLYCOSYLATED A1C: CPT

## 2019-11-25 PROCEDURE — 36415 COLL VENOUS BLD VENIPUNCTURE: CPT | Mod: PO

## 2019-11-25 PROCEDURE — 99999 PR PBB SHADOW E&M-EST. PATIENT-LVL III: ICD-10-PCS | Mod: PBBFAC,,, | Performed by: FAMILY MEDICINE

## 2019-11-25 PROCEDURE — 99999 PR PBB SHADOW E&M-EST. PATIENT-LVL III: CPT | Mod: PBBFAC,,, | Performed by: FAMILY MEDICINE

## 2019-11-25 PROCEDURE — 85025 COMPLETE CBC W/AUTO DIFF WBC: CPT

## 2019-11-25 PROCEDURE — 99396 PR PREVENTIVE VISIT,EST,40-64: ICD-10-PCS | Mod: S$GLB,,, | Performed by: FAMILY MEDICINE

## 2019-11-25 RX ORDER — LEVOTHYROXINE SODIUM 175 UG/1
175 TABLET ORAL DAILY
Qty: 90 TABLET | Refills: 1 | Status: SHIPPED | OUTPATIENT
Start: 2019-11-25 | End: 2020-07-16 | Stop reason: SDUPTHER

## 2019-11-25 NOTE — PROGRESS NOTES
Subjective:      Patient ID: Mary Patterson is a 59 y.o. female.    Chief Complaint: Annual Exam    Disclaimer:  This note is prepared using voice recognition software and as such is likely to have errors and has not been proof read. Please contact me for questions.     Coming in today for thyroid, diabetes, and annual exam.     Lab Results       Component                Value               Date                       HGBA1C                   9.0 (H)             02/12/2019                 HGBA1C                   9.7 (H)             08/25/2017                 HGBA1C                   8.0 (H)             07/21/2016             Lab Results       Component                Value               Date                       CHOL                     165                 10/15/2019                 CHOL                     184                 02/12/2019                 CHOL                     161                 08/25/2017            Lab Results       Component                Value               Date                       LDLCALC                  95.0                10/15/2019                 LDLCALC                  114.6               02/12/2019                 LDLCALC                  101.6               08/25/2017              Wt Readings from Last 10 Encounters:  11/25/19 : 97 kg (213 lb 13.5 oz)  03/28/19 : 97.2 kg (214 lb 4.6 oz)  03/22/19 : 98.1 kg (216 lb 4.3 oz)  02/26/19 : 99 kg (218 lb 4.8 oz)  02/12/19 : 99.9 kg (220 lb 3.8 oz)  12/10/18 : 100 kg (220 lb 7.4 oz)  11/16/18 : 100.3 kg (221 lb 1.9 oz)  09/12/17 : 100.7 kg (222 lb)  04/18/17 : 103.4 kg (227 lb 15.3 oz)  02/21/17 : 101.4 kg (223 lb 8.7 oz)    Was not able to get the A1c or electrolyte studies performed with her last the lab work.  Did get cholesterol profile.  Triglycerides are up some and HDL is lower.  She is not on any medicine other than a multivitamin and her levothyroxine.  She feels good with the levothyroxine 175 mcg.  Also since starting the  multivitamin she feels like it has helped a lot with her hair not falling out.  Energy why she is doing well also.  She is not on a vitamin-D supplement or iron at this time either.    She is adamant that she does not want to go on any medicines for diabetes nor cholesterol at this time.  She has been trying to work on her weight loss.  She is willing to do lab work today.  Needs to update an eye exam as well.  Needing to update a mammogram as well.  Heavily discussed her doing the digital diabetes program.  Her biggest issue usually is taking off from work because she is a  and cannot leave work until 4:00 pm..  She must to all visits on vacation times as well.  She reports that ultimately she plans on retiring in the next few years.    Of note she does not want get a flu shot today.  She believes that last time she battled a lot of ear nose and throat issues right afterwards.    We did discuss the new shingles vaccine but she is going to hold off on it as well.        Lab Results   Component Value Date    WBC 8.89 02/12/2019    HGB 13.3 02/12/2019    HCT 40.9 02/12/2019     02/12/2019    CHOL 165 10/15/2019    TRIG 175 (H) 10/15/2019    HDL 35 (L) 10/15/2019    ALT 21 02/12/2019    AST 19 02/12/2019     02/12/2019    K 4.1 02/12/2019     02/12/2019    CREATININE 0.8 02/12/2019    BUN 10 02/12/2019    CO2 29 02/12/2019    TSH 0.117 (L) 10/15/2019    HGBA1C 9.0 (H) 02/12/2019       US Soft Tissue Head Neck Thyroid  Narrative: EXAMINATION:  US SOFT TISSUE HEAD NECK THYROID    CLINICAL HISTORY:  Other specified abnormal findings of blood chemistry    TECHNIQUE:  Ultrasound of the thyroid and cervical lymph nodes was performed.    COMPARISON:  None    FINDINGS:  The thyroid gland demonstrates a heterogeneous echotexture.  The isthmus measures 2 mm in thickness.  The right lobe of the thyroid gland measures 4.7 cm in length.  The left lobe of the thyroid gland measures 3.8 cm in  "length.    No discrete thyroid nodules.  Impression: 1. Atrophied heterogeneous thyroid gland.  No discrete thyroid nodules.    Electronically signed by: Jeremiah Aburto DO  Date:    03/29/2019  Time:    14:37        Review of Systems   Constitutional: Negative for activity change, appetite change, chills, fatigue and fever.   HENT: Negative for ear pain and trouble swallowing.    Eyes: Negative for pain and visual disturbance.   Respiratory: Negative for cough and shortness of breath.    Cardiovascular: Negative for chest pain and leg swelling.   Gastrointestinal: Negative for abdominal pain, blood in stool, nausea and vomiting.   Endocrine: Negative for cold intolerance and heat intolerance.   Genitourinary: Negative for dysuria and frequency.   Musculoskeletal: Negative for joint swelling, myalgias and neck pain.   Skin: Negative for color change and rash.   Neurological: Negative for dizziness and headaches.   Psychiatric/Behavioral: Negative for behavioral problems and sleep disturbance.     Objective:     Vitals:    11/25/19 1242   BP: 126/84   Pulse: 62   Weight: 97 kg (213 lb 13.5 oz)   Height: 5' 5" (1.651 m)     Physical Exam   Constitutional: She is oriented to person, place, and time. She appears well-developed and well-nourished.   O HF   HENT:   Head: Normocephalic and atraumatic.   Right Ear: External ear normal.   Left Ear: External ear normal.   Mouth/Throat: Oropharynx is clear and moist.   Eyes: Pupils are equal, round, and reactive to light. Conjunctivae and EOM are normal.   Neck: Normal range of motion. Neck supple. No thyromegaly present.   Cardiovascular: Normal rate and regular rhythm. Exam reveals no gallop and no friction rub.   No murmur heard.  Pulmonary/Chest: Effort normal. No respiratory distress. She has no wheezes. She has no rales.   Abdominal: Soft. Bowel sounds are normal. She exhibits no distension. There is no tenderness. There is no rebound.   Musculoskeletal: Normal range of " motion. She exhibits no edema.   Lymphadenopathy:     She has no cervical adenopathy.   Neurological: She is alert and oriented to person, place, and time.   Skin: Skin is warm and dry. No rash noted.   Psychiatric: She has a normal mood and affect. Her behavior is normal. Judgment and thought content normal.   Vitals reviewed.    Assessment:     1. Routine general medical examination at a health care facility    2. Hyperlipidemia associated with type 2 diabetes mellitus    3. Encounter for screening mammogram for breast cancer    4. Hypothyroidism, unspecified type    5. Uncontrolled type 2 diabetes mellitus with hyperglycemia, without long-term current use of insulin      Plan:   Mary was seen today for annual exam.    Diagnoses and all orders for this visit:    Routine general medical examination at a health care facility-reviewed labs update additional labs discussed health maintenance discussed digital diabetes program discussed eye exam.  Discussed mammogram.  Declines flu vaccine at this time.    Hyperlipidemia associated with type 2 diabetes mellitus-discussed dietary changes discussed HDL improvement.  Discussed further weight loss  -     Hemoglobin A1c; Future  -     Comprehensive metabolic panel; Future  -     CBC auto differential; Future    Encounter for screening mammogram for breast cancer scheduling December for the patient while she is on [place] break  -     Mammo Digital Screening Bilat; Future    Hypothyroidism, unspecified type-labs reviewed benefitting from generic levothyroxine 175  -     Hemoglobin A1c; Future  -     Comprehensive metabolic panel; Future  -     CBC auto differential; Future  -     levothyroxine (SYNTHROID, LEVOTHROID) 175 MCG tablet; Take 1 tablet (175 mcg total) by mouth once daily.    Uncontrolled type 2 diabetes mellitus with hyperglycemia, without long-term current use of insulin not controlled at this time does not want to do medications obtain A1c highly encouraged  her to do the digital diabetes program  -     Hemoglobin A1c; Future  -     Comprehensive metabolic panel; Future .  -     CBC auto differential; Future  -     Ambulatory referral to Optometry  -     Diabetes Digital Medicine (DDMP) Enrollment Order            Follow up in about 6 months (around 5/25/2020) for chronic issues Dr Farmer.    Patient Instructions   You can get a shingles vaccine, but currently the old one is not recommended anymore. Shingrix vaccine is the new shingles vaccine. You can ask at your local pharmacy.  It is 2 shots, and is not a live vaccine. It is usually  covered by insurance. It is 90 % effective against Shingles. You can start it now at age 50.  It is currently on backorder at some pharmacies and thus it may be difficult to get the 2nd shot. We will have it at our clinics around Jan and Feb of 2019 once the back-order has been dealt with by the . You do not need a prescription to receive it.     Please let me know if you have further questions.    Sincerely,   Eun Farmer MD

## 2019-11-25 NOTE — PATIENT INSTRUCTIONS
You can get a shingles vaccine, but currently the old one is not recommended anymore. Shingrix vaccine is the new shingles vaccine. You can ask at your local pharmacy.  It is 2 shots, and is not a live vaccine. It is usually  covered by insurance. It is 90 % effective against Shingles. You can start it now at age 50.  It is currently on backorder at some pharmacies and thus it may be difficult to get the 2nd shot. We will have it at our clinics around Jan and Feb of 2019 once the back-order has been dealt with by the . You do not need a prescription to receive it.     Please let me know if you have further questions.    Sincerely,   Eun Farmer MD

## 2019-11-26 LAB
ESTIMATED AVG GLUCOSE: 194 MG/DL (ref 68–131)
HBA1C MFR BLD HPLC: 8.4 % (ref 4–5.6)

## 2019-11-27 ENCOUNTER — PATIENT MESSAGE (OUTPATIENT)
Dept: INTERNAL MEDICINE | Facility: CLINIC | Age: 59
End: 2019-11-27

## 2020-01-20 ENCOUNTER — PATIENT OUTREACH (OUTPATIENT)
Dept: ADMINISTRATIVE | Facility: HOSPITAL | Age: 60
End: 2020-01-20

## 2020-01-20 ENCOUNTER — TELEPHONE (OUTPATIENT)
Dept: OPHTHALMOLOGY | Facility: CLINIC | Age: 60
End: 2020-01-20

## 2020-01-20 ENCOUNTER — TELEPHONE (OUTPATIENT)
Dept: INTERNAL MEDICINE | Facility: CLINIC | Age: 60
End: 2020-01-20

## 2020-01-20 NOTE — TELEPHONE ENCOUNTER
----- Message from Renuka Vieyra sent at 1/20/2020 11:23 AM CST -----  Contact: pt  The pt request a call to reschedule her 12/27/2019 appt, no additional info given and can be reached at [phone]///[email or web address]

## 2020-01-20 NOTE — TELEPHONE ENCOUNTER
----- Message from Renuka Vieyra sent at 1/20/2020 11:19 AM CST -----  Contact: pt  1. What is the name of the medication you are requesting? Thyroid mediation   2. What is the dose? 175mg  3. How do you take the medication? Orally, topically, etc? Orally  4. How often do you take this medication? n/a  5. Do you need a 30 day or 90 day supply? n/a  6. How many refills are you requesting? n/a  7. What is your preferred pharmacy and location of the pharmacy? [facility] on [address]  8. Who can we contact with further questions? The pt at [phone]

## 2020-06-26 DIAGNOSIS — E11.9 TYPE 2 DIABETES MELLITUS WITHOUT COMPLICATION: ICD-10-CM

## 2020-06-30 ENCOUNTER — PATIENT OUTREACH (OUTPATIENT)
Dept: ADMINISTRATIVE | Facility: OTHER | Age: 60
End: 2020-06-30

## 2020-06-30 NOTE — PROGRESS NOTES
Requested updates within Care Everywhere.  Patient's chart was reviewed for overdue ISAURA topics.  Immunizations reconciled.    Orders previously placed: Hgb A1C, mammo  Eye exam scheduled 7/1/20.

## 2020-07-01 ENCOUNTER — OFFICE VISIT (OUTPATIENT)
Dept: OPHTHALMOLOGY | Facility: CLINIC | Age: 60
End: 2020-07-01
Payer: COMMERCIAL

## 2020-07-01 DIAGNOSIS — H25.13 NUCLEAR SCLEROSIS, BILATERAL: ICD-10-CM

## 2020-07-01 DIAGNOSIS — H52.4 MYOPIA WITH PRESBYOPIA, BILATERAL: ICD-10-CM

## 2020-07-01 DIAGNOSIS — H00.12 CHALAZION OF RIGHT LOWER EYELID: ICD-10-CM

## 2020-07-01 DIAGNOSIS — E11.9 TYPE 2 DIABETES MELLITUS WITHOUT RETINOPATHY: Primary | ICD-10-CM

## 2020-07-01 DIAGNOSIS — H52.13 MYOPIA WITH PRESBYOPIA, BILATERAL: ICD-10-CM

## 2020-07-01 LAB
LEFT EYE DM RETINOPATHY: NEGATIVE
RIGHT EYE DM RETINOPATHY: NEGATIVE

## 2020-07-01 PROCEDURE — 99999 PR PBB SHADOW E&M-EST. PATIENT-LVL II: CPT | Mod: PBBFAC,,, | Performed by: OPTOMETRIST

## 2020-07-01 PROCEDURE — 92014 PR EYE EXAM, EST PATIENT,COMPREHESV: ICD-10-PCS | Mod: S$GLB,,, | Performed by: OPTOMETRIST

## 2020-07-01 PROCEDURE — 99999 PR PBB SHADOW E&M-EST. PATIENT-LVL II: ICD-10-PCS | Mod: PBBFAC,,, | Performed by: OPTOMETRIST

## 2020-07-01 PROCEDURE — 92014 COMPRE OPH EXAM EST PT 1/>: CPT | Mod: S$GLB,,, | Performed by: OPTOMETRIST

## 2020-07-01 PROCEDURE — 92015 DETERMINE REFRACTIVE STATE: CPT | Mod: S$GLB,,, | Performed by: OPTOMETRIST

## 2020-07-01 PROCEDURE — 92015 PR REFRACTION: ICD-10-PCS | Mod: S$GLB,,, | Performed by: OPTOMETRIST

## 2020-07-01 NOTE — PROGRESS NOTES
HPI     Diabetic Eye Exam     Comments: yearly              Comments     NP to DNL  Lasts een by [facility] on 11/21/17 for yearly DM eye exam  Patient here today for yearly DM eye exam  Diabetic eye exam  Diagnosed with diabees 10 years ago  Recent vision fluctuations No  Lab Results       Component                Value               Date                       HGBA1C                   8.4 (H)             11/25/2019              HPI    Any vision changes since last exam: No   Eye pain: No  Other ocular symptoms: Stye bottom OD lid has been present for about 10   days Using warm compresses no make-up    Do you wear currently wear glasses or contacts? CTL OS    Interested in contacts today? Yes    Do you plan on getting new glasses today? If needed                Last edited by Chiara Banks, PCT on 7/1/2020  9:56 AM. (History)            Assessment /Plan     For exam results, see Encounter Report.    Type 2 diabetes mellitus without retinopathy  No diabetic retinopathy in either eye  Continue close care with PCP   Monitor 12 months    Nuclear sclerosis, bilateral  Surgery is not indicated at this time.   Monitor 12 months.    Myopia with presbyopia, bilateral  Eyeglass Final Rx     Eyeglass Final Rx       Sphere    Right -1.75    Left -2.00    Expiration Date: 7/2/2021              Contact Lens Prescription (7/1/2020)        Brand Base Curve Diameter Sphere    Right No lens       Left Air Optix HydraGlyde 8.60 14.2 -2.00    Expiration Date: 7/2/2021    Replacement: Monthly    Wearing Schedule: Daily wear          Chalazion of right lower eyelid  Doing well, improving per pt  Continue warm compresses tid  RTC PRN with any worsening    Otherwise, RTC 1 yr for dilated eye exam or PRN   Discussed above and answered questions.

## 2020-07-16 DIAGNOSIS — E11.65 TYPE 2 DIABETES MELLITUS WITH HYPERGLYCEMIA, WITHOUT LONG-TERM CURRENT USE OF INSULIN: Primary | ICD-10-CM

## 2020-07-16 DIAGNOSIS — E07.9 THYROID DISEASE: ICD-10-CM

## 2020-07-16 DIAGNOSIS — E03.9 HYPOTHYROIDISM, UNSPECIFIED TYPE: ICD-10-CM

## 2020-07-16 DIAGNOSIS — E11.65 UNCONTROLLED TYPE 2 DIABETES MELLITUS WITH HYPERGLYCEMIA, WITHOUT LONG-TERM CURRENT USE OF INSULIN: ICD-10-CM

## 2020-07-16 RX ORDER — LEVOTHYROXINE SODIUM 175 UG/1
175 TABLET ORAL DAILY
Qty: 90 TABLET | Refills: 1 | Status: SHIPPED | OUTPATIENT
Start: 2020-07-16 | End: 2021-01-19 | Stop reason: SDUPTHER

## 2020-07-16 NOTE — TELEPHONE ENCOUNTER
----- Message from Uday Brumfield sent at 7/16/2020  2:54 PM CDT -----  Regarding: Refills/labs  Pt calling in regards to she needs Refill approval on medication and Lab work done, also A1C      ===levothyroxine (SYNTHROID, LEVOTHROID) 175 MCG tablet      [address] [facility] [place] [facility] [address]      Please advise pt can be contact at [phone]

## 2020-07-22 ENCOUNTER — LAB VISIT (OUTPATIENT)
Dept: LAB | Facility: HOSPITAL | Age: 60
End: 2020-07-22
Attending: FAMILY MEDICINE
Payer: COMMERCIAL

## 2020-07-22 DIAGNOSIS — E03.9 HYPOTHYROIDISM, UNSPECIFIED TYPE: ICD-10-CM

## 2020-07-22 DIAGNOSIS — E11.65 UNCONTROLLED TYPE 2 DIABETES MELLITUS WITH HYPERGLYCEMIA, WITHOUT LONG-TERM CURRENT USE OF INSULIN: ICD-10-CM

## 2020-07-22 PROCEDURE — 83036 HEMOGLOBIN GLYCOSYLATED A1C: CPT

## 2020-07-22 PROCEDURE — 85025 COMPLETE CBC W/AUTO DIFF WBC: CPT

## 2020-07-22 PROCEDURE — 84443 ASSAY THYROID STIM HORMONE: CPT

## 2020-07-22 PROCEDURE — 84439 ASSAY OF FREE THYROXINE: CPT

## 2020-07-22 PROCEDURE — 80053 COMPREHEN METABOLIC PANEL: CPT

## 2020-07-22 PROCEDURE — 36415 COLL VENOUS BLD VENIPUNCTURE: CPT | Mod: PO

## 2020-07-22 PROCEDURE — 80061 LIPID PANEL: CPT

## 2020-07-23 LAB
ALBUMIN SERPL BCP-MCNC: 3.9 G/DL (ref 3.5–5.2)
ALP SERPL-CCNC: 63 U/L (ref 55–135)
ALT SERPL W/O P-5'-P-CCNC: 16 U/L (ref 10–44)
ANION GAP SERPL CALC-SCNC: 7 MMOL/L (ref 8–16)
AST SERPL-CCNC: 17 U/L (ref 10–40)
BASOPHILS # BLD AUTO: 0.06 K/UL (ref 0–0.2)
BASOPHILS NFR BLD: 0.7 % (ref 0–1.9)
BILIRUB SERPL-MCNC: 0.5 MG/DL (ref 0.1–1)
BUN SERPL-MCNC: 10 MG/DL (ref 6–20)
CALCIUM SERPL-MCNC: 9.5 MG/DL (ref 8.7–10.5)
CHLORIDE SERPL-SCNC: 102 MMOL/L (ref 95–110)
CHOLEST SERPL-MCNC: 184 MG/DL (ref 120–199)
CHOLEST/HDLC SERPL: 5 {RATIO} (ref 2–5)
CO2 SERPL-SCNC: 28 MMOL/L (ref 23–29)
CREAT SERPL-MCNC: 1 MG/DL (ref 0.5–1.4)
DIFFERENTIAL METHOD: ABNORMAL
EOSINOPHIL # BLD AUTO: 0.3 K/UL (ref 0–0.5)
EOSINOPHIL NFR BLD: 3.7 % (ref 0–8)
ERYTHROCYTE [DISTWIDTH] IN BLOOD BY AUTOMATED COUNT: 13.2 % (ref 11.5–14.5)
EST. GFR  (AFRICAN AMERICAN): >60 ML/MIN/1.73 M^2
EST. GFR  (NON AFRICAN AMERICAN): >60 ML/MIN/1.73 M^2
ESTIMATED AVG GLUCOSE: 217 MG/DL (ref 68–131)
GLUCOSE SERPL-MCNC: 228 MG/DL (ref 70–110)
HBA1C MFR BLD HPLC: 9.2 % (ref 4–5.6)
HCT VFR BLD AUTO: 43.8 % (ref 37–48.5)
HDLC SERPL-MCNC: 37 MG/DL (ref 40–75)
HDLC SERPL: 20.1 % (ref 20–50)
HGB BLD-MCNC: 13.6 G/DL (ref 12–16)
IMM GRANULOCYTES # BLD AUTO: 0.04 K/UL (ref 0–0.04)
IMM GRANULOCYTES NFR BLD AUTO: 0.5 % (ref 0–0.5)
LDLC SERPL CALC-MCNC: 110.2 MG/DL (ref 63–159)
LYMPHOCYTES # BLD AUTO: 2.6 K/UL (ref 1–4.8)
LYMPHOCYTES NFR BLD: 30.8 % (ref 18–48)
MCH RBC QN AUTO: 29.8 PG (ref 27–31)
MCHC RBC AUTO-ENTMCNC: 31.1 G/DL (ref 32–36)
MCV RBC AUTO: 96 FL (ref 82–98)
MONOCYTES # BLD AUTO: 0.5 K/UL (ref 0.3–1)
MONOCYTES NFR BLD: 5.5 % (ref 4–15)
NEUTROPHILS # BLD AUTO: 4.9 K/UL (ref 1.8–7.7)
NEUTROPHILS NFR BLD: 58.8 % (ref 38–73)
NONHDLC SERPL-MCNC: 147 MG/DL
NRBC BLD-RTO: 0 /100 WBC
PLATELET # BLD AUTO: 196 K/UL (ref 150–350)
PMV BLD AUTO: 12.8 FL (ref 9.2–12.9)
POTASSIUM SERPL-SCNC: 4.1 MMOL/L (ref 3.5–5.1)
PROT SERPL-MCNC: 7.7 G/DL (ref 6–8.4)
RBC # BLD AUTO: 4.57 M/UL (ref 4–5.4)
SODIUM SERPL-SCNC: 137 MMOL/L (ref 136–145)
T4 FREE SERPL-MCNC: 1.25 NG/DL (ref 0.71–1.51)
TRIGL SERPL-MCNC: 184 MG/DL (ref 30–150)
TSH SERPL DL<=0.005 MIU/L-ACNC: 0.39 UIU/ML (ref 0.4–4)
WBC # BLD AUTO: 8.29 K/UL (ref 3.9–12.7)

## 2020-10-21 ENCOUNTER — PATIENT OUTREACH (OUTPATIENT)
Dept: ADMINISTRATIVE | Facility: HOSPITAL | Age: 60
End: 2020-10-21

## 2021-01-14 ENCOUNTER — TELEPHONE (OUTPATIENT)
Dept: PRIMARY CARE CLINIC | Facility: CLINIC | Age: 61
End: 2021-01-14

## 2021-01-14 DIAGNOSIS — E03.9 HYPOTHYROIDISM, UNSPECIFIED TYPE: ICD-10-CM

## 2021-01-14 RX ORDER — LEVOTHYROXINE SODIUM 175 UG/1
175 TABLET ORAL DAILY
Qty: 30 TABLET | Refills: 0 | OUTPATIENT
Start: 2021-01-14

## 2021-01-16 ENCOUNTER — PATIENT MESSAGE (OUTPATIENT)
Dept: PRIMARY CARE CLINIC | Facility: CLINIC | Age: 61
End: 2021-01-16

## 2021-01-16 DIAGNOSIS — E03.9 HYPOTHYROIDISM, UNSPECIFIED TYPE: ICD-10-CM

## 2021-01-18 RX ORDER — LEVOTHYROXINE SODIUM 175 UG/1
TABLET ORAL
Qty: 90 TABLET | Refills: 0 | OUTPATIENT
Start: 2021-01-18

## 2021-01-19 ENCOUNTER — TELEPHONE (OUTPATIENT)
Dept: PRIMARY CARE CLINIC | Facility: CLINIC | Age: 61
End: 2021-01-19

## 2021-01-19 DIAGNOSIS — E03.9 HYPOTHYROIDISM, UNSPECIFIED TYPE: ICD-10-CM

## 2021-01-19 RX ORDER — LEVOTHYROXINE SODIUM 175 UG/1
175 TABLET ORAL DAILY
Qty: 30 TABLET | Refills: 0 | Status: SHIPPED | OUTPATIENT
Start: 2021-01-19 | End: 2021-02-07 | Stop reason: SDUPTHER

## 2021-01-27 DIAGNOSIS — Z12.31 OTHER SCREENING MAMMOGRAM: ICD-10-CM

## 2021-01-29 ENCOUNTER — TELEPHONE (OUTPATIENT)
Dept: PRIMARY CARE CLINIC | Facility: CLINIC | Age: 61
End: 2021-01-29

## 2021-01-29 ENCOUNTER — PATIENT MESSAGE (OUTPATIENT)
Dept: PRIMARY CARE CLINIC | Facility: CLINIC | Age: 61
End: 2021-01-29

## 2021-02-03 ENCOUNTER — OFFICE VISIT (OUTPATIENT)
Dept: PRIMARY CARE CLINIC | Facility: CLINIC | Age: 61
End: 2021-02-03
Payer: COMMERCIAL

## 2021-02-03 DIAGNOSIS — E11.65 UNCONTROLLED TYPE 2 DIABETES MELLITUS WITH HYPERGLYCEMIA, WITHOUT LONG-TERM CURRENT USE OF INSULIN: ICD-10-CM

## 2021-02-03 DIAGNOSIS — Z00.00 ROUTINE GENERAL MEDICAL EXAMINATION AT A HEALTH CARE FACILITY: ICD-10-CM

## 2021-02-03 DIAGNOSIS — E03.9 HYPOTHYROIDISM, UNSPECIFIED TYPE: Primary | ICD-10-CM

## 2021-02-03 DIAGNOSIS — E66.01 CLASS 2 SEVERE OBESITY DUE TO EXCESS CALORIES WITH SERIOUS COMORBIDITY AND BODY MASS INDEX (BMI) OF 36.0 TO 36.9 IN ADULT: ICD-10-CM

## 2021-02-03 PROCEDURE — 99214 OFFICE O/P EST MOD 30 MIN: CPT | Mod: 95,,, | Performed by: FAMILY MEDICINE

## 2021-02-03 PROCEDURE — 3072F LOW RISK FOR RETINOPATHY: CPT | Mod: ,,, | Performed by: FAMILY MEDICINE

## 2021-02-03 PROCEDURE — 3046F HEMOGLOBIN A1C LEVEL >9.0%: CPT | Mod: CPTII,,, | Performed by: FAMILY MEDICINE

## 2021-02-03 PROCEDURE — 3046F PR MOST RECENT HEMOGLOBIN A1C LEVEL > 9.0%: ICD-10-PCS | Mod: CPTII,,, | Performed by: FAMILY MEDICINE

## 2021-02-03 PROCEDURE — 99214 PR OFFICE/OUTPT VISIT, EST, LEVL IV, 30-39 MIN: ICD-10-PCS | Mod: 95,,, | Performed by: FAMILY MEDICINE

## 2021-02-03 PROCEDURE — 3072F PR LOW RISK FOR RETINOPATHY: ICD-10-PCS | Mod: ,,, | Performed by: FAMILY MEDICINE

## 2021-02-06 ENCOUNTER — LAB VISIT (OUTPATIENT)
Dept: LAB | Facility: HOSPITAL | Age: 61
End: 2021-02-06
Attending: FAMILY MEDICINE
Payer: COMMERCIAL

## 2021-02-06 ENCOUNTER — LAB VISIT (OUTPATIENT)
Dept: LAB | Facility: HOSPITAL | Age: 61
End: 2021-02-06
Payer: COMMERCIAL

## 2021-02-06 DIAGNOSIS — Z00.00 ROUTINE GENERAL MEDICAL EXAMINATION AT A HEALTH CARE FACILITY: ICD-10-CM

## 2021-02-06 DIAGNOSIS — E11.65 UNCONTROLLED TYPE 2 DIABETES MELLITUS WITH HYPERGLYCEMIA, WITHOUT LONG-TERM CURRENT USE OF INSULIN: ICD-10-CM

## 2021-02-06 DIAGNOSIS — E66.01 CLASS 2 SEVERE OBESITY DUE TO EXCESS CALORIES WITH SERIOUS COMORBIDITY AND BODY MASS INDEX (BMI) OF 36.0 TO 36.9 IN ADULT: ICD-10-CM

## 2021-02-06 DIAGNOSIS — E03.9 HYPOTHYROIDISM, UNSPECIFIED TYPE: ICD-10-CM

## 2021-02-06 LAB
ALBUMIN SERPL BCP-MCNC: 3.8 G/DL (ref 3.5–5.2)
ALBUMIN/CREAT UR: 9.4 UG/MG (ref 0–30)
ALP SERPL-CCNC: 68 U/L (ref 55–135)
ALT SERPL W/O P-5'-P-CCNC: 25 U/L (ref 10–44)
ANION GAP SERPL CALC-SCNC: 9 MMOL/L (ref 8–16)
AST SERPL-CCNC: 23 U/L (ref 10–40)
BASOPHILS # BLD AUTO: 0.03 K/UL (ref 0–0.2)
BASOPHILS NFR BLD: 0.5 % (ref 0–1.9)
BILIRUB SERPL-MCNC: 0.6 MG/DL (ref 0.1–1)
BUN SERPL-MCNC: 11 MG/DL (ref 6–20)
CALCIUM SERPL-MCNC: 9.1 MG/DL (ref 8.7–10.5)
CHLORIDE SERPL-SCNC: 102 MMOL/L (ref 95–110)
CHOLEST SERPL-MCNC: 175 MG/DL (ref 120–199)
CHOLEST/HDLC SERPL: 4.6 {RATIO} (ref 2–5)
CO2 SERPL-SCNC: 26 MMOL/L (ref 23–29)
CREAT SERPL-MCNC: 0.9 MG/DL (ref 0.5–1.4)
CREAT UR-MCNC: 202 MG/DL (ref 15–325)
DIFFERENTIAL METHOD: ABNORMAL
EOSINOPHIL # BLD AUTO: 0.3 K/UL (ref 0–0.5)
EOSINOPHIL NFR BLD: 4 % (ref 0–8)
ERYTHROCYTE [DISTWIDTH] IN BLOOD BY AUTOMATED COUNT: 12.7 % (ref 11.5–14.5)
EST. GFR  (AFRICAN AMERICAN): >60 ML/MIN/1.73 M^2
EST. GFR  (NON AFRICAN AMERICAN): >60 ML/MIN/1.73 M^2
ESTIMATED AVG GLUCOSE: 260 MG/DL (ref 68–131)
GLUCOSE SERPL-MCNC: 282 MG/DL (ref 70–110)
HBA1C MFR BLD: 10.7 % (ref 4–5.6)
HCT VFR BLD AUTO: 41.7 % (ref 37–48.5)
HDLC SERPL-MCNC: 38 MG/DL (ref 40–75)
HDLC SERPL: 21.7 % (ref 20–50)
HGB BLD-MCNC: 13.5 G/DL (ref 12–16)
IMM GRANULOCYTES # BLD AUTO: 0.03 K/UL (ref 0–0.04)
IMM GRANULOCYTES NFR BLD AUTO: 0.5 % (ref 0–0.5)
INSULIN COLLECTION INTERVAL: NORMAL
INSULIN SERPL-ACNC: 18.2 UU/ML
LDLC SERPL CALC-MCNC: 102.8 MG/DL (ref 63–159)
LYMPHOCYTES # BLD AUTO: 1.7 K/UL (ref 1–4.8)
LYMPHOCYTES NFR BLD: 25.7 % (ref 18–48)
MCH RBC QN AUTO: 28.4 PG (ref 27–31)
MCHC RBC AUTO-ENTMCNC: 32.4 G/DL (ref 32–36)
MCV RBC AUTO: 88 FL (ref 82–98)
MICROALBUMIN UR DL<=1MG/L-MCNC: 19 UG/ML
MONOCYTES # BLD AUTO: 0.3 K/UL (ref 0.3–1)
MONOCYTES NFR BLD: 4.3 % (ref 4–15)
NEUTROPHILS # BLD AUTO: 4.3 K/UL (ref 1.8–7.7)
NEUTROPHILS NFR BLD: 65 % (ref 38–73)
NONHDLC SERPL-MCNC: 137 MG/DL
NRBC BLD-RTO: 0 /100 WBC
PLATELET # BLD AUTO: 187 K/UL (ref 150–350)
PMV BLD AUTO: 13.4 FL (ref 9.2–12.9)
POTASSIUM SERPL-SCNC: 4.2 MMOL/L (ref 3.5–5.1)
PROT SERPL-MCNC: 7.5 G/DL (ref 6–8.4)
RBC # BLD AUTO: 4.75 M/UL (ref 4–5.4)
SODIUM SERPL-SCNC: 137 MMOL/L (ref 136–145)
T4 FREE SERPL-MCNC: 1.35 NG/DL (ref 0.71–1.51)
TRIGL SERPL-MCNC: 171 MG/DL (ref 30–150)
TSH SERPL DL<=0.005 MIU/L-ACNC: 0.24 UIU/ML (ref 0.4–4)
WBC # BLD AUTO: 6.54 K/UL (ref 3.9–12.7)

## 2021-02-06 PROCEDURE — 85025 COMPLETE CBC W/AUTO DIFF WBC: CPT

## 2021-02-06 PROCEDURE — 83036 HEMOGLOBIN GLYCOSYLATED A1C: CPT

## 2021-02-06 PROCEDURE — 84443 ASSAY THYROID STIM HORMONE: CPT

## 2021-02-06 PROCEDURE — 82043 UR ALBUMIN QUANTITATIVE: CPT

## 2021-02-06 PROCEDURE — 82570 ASSAY OF URINE CREATININE: CPT

## 2021-02-06 PROCEDURE — 80061 LIPID PANEL: CPT

## 2021-02-06 PROCEDURE — 80053 COMPREHEN METABOLIC PANEL: CPT

## 2021-02-06 PROCEDURE — 83525 ASSAY OF INSULIN: CPT

## 2021-02-06 PROCEDURE — 84439 ASSAY OF FREE THYROXINE: CPT

## 2021-02-06 PROCEDURE — 36415 COLL VENOUS BLD VENIPUNCTURE: CPT

## 2021-02-07 ENCOUNTER — PATIENT MESSAGE (OUTPATIENT)
Dept: PRIMARY CARE CLINIC | Facility: CLINIC | Age: 61
End: 2021-02-07

## 2021-02-07 DIAGNOSIS — E11.65 UNCONTROLLED TYPE 2 DIABETES MELLITUS WITH HYPERGLYCEMIA, WITHOUT LONG-TERM CURRENT USE OF INSULIN: Primary | ICD-10-CM

## 2021-02-07 DIAGNOSIS — E03.9 HYPOTHYROIDISM, UNSPECIFIED TYPE: ICD-10-CM

## 2021-02-07 DIAGNOSIS — E66.01 CLASS 2 SEVERE OBESITY DUE TO EXCESS CALORIES WITH SERIOUS COMORBIDITY AND BODY MASS INDEX (BMI) OF 36.0 TO 36.9 IN ADULT: ICD-10-CM

## 2021-02-07 RX ORDER — LEVOTHYROXINE SODIUM 175 UG/1
175 TABLET ORAL DAILY
Qty: 90 TABLET | Refills: 3 | Status: SHIPPED | OUTPATIENT
Start: 2021-02-07 | End: 2022-02-23

## 2021-02-07 RX ORDER — DULAGLUTIDE 0.75 MG/.5ML
0.75 INJECTION, SOLUTION SUBCUTANEOUS
Qty: 2 ML | Refills: 11 | Status: SHIPPED | OUTPATIENT
Start: 2021-02-07 | End: 2021-03-01 | Stop reason: SDUPTHER

## 2021-02-25 ENCOUNTER — TELEPHONE (OUTPATIENT)
Dept: PHARMACY | Facility: CLINIC | Age: 61
End: 2021-02-25

## 2021-03-01 ENCOUNTER — OFFICE VISIT (OUTPATIENT)
Dept: PRIMARY CARE CLINIC | Facility: CLINIC | Age: 61
End: 2021-03-01
Payer: COMMERCIAL

## 2021-03-01 VITALS
HEART RATE: 67 BPM | WEIGHT: 215.25 LBS | TEMPERATURE: 97 F | DIASTOLIC BLOOD PRESSURE: 70 MMHG | SYSTOLIC BLOOD PRESSURE: 139 MMHG | OXYGEN SATURATION: 98 % | BODY MASS INDEX: 35.82 KG/M2

## 2021-03-01 DIAGNOSIS — E11.59 HYPERTENSION ASSOCIATED WITH DIABETES: ICD-10-CM

## 2021-03-01 DIAGNOSIS — Z91.199 NONCOMPLIANCE WITH DIABETES TREATMENT: ICD-10-CM

## 2021-03-01 DIAGNOSIS — E03.9 HYPOTHYROIDISM, UNSPECIFIED TYPE: ICD-10-CM

## 2021-03-01 DIAGNOSIS — E11.65 UNCONTROLLED TYPE 2 DIABETES MELLITUS WITH HYPERGLYCEMIA, WITHOUT LONG-TERM CURRENT USE OF INSULIN: Primary | ICD-10-CM

## 2021-03-01 DIAGNOSIS — E66.01 CLASS 2 SEVERE OBESITY DUE TO EXCESS CALORIES WITH SERIOUS COMORBIDITY AND BODY MASS INDEX (BMI) OF 36.0 TO 36.9 IN ADULT: ICD-10-CM

## 2021-03-01 DIAGNOSIS — E78.5 HYPERLIPIDEMIA ASSOCIATED WITH TYPE 2 DIABETES MELLITUS: ICD-10-CM

## 2021-03-01 DIAGNOSIS — I15.2 HYPERTENSION ASSOCIATED WITH DIABETES: ICD-10-CM

## 2021-03-01 DIAGNOSIS — N76.1 SUBACUTE VAGINITIS: ICD-10-CM

## 2021-03-01 DIAGNOSIS — Z00.00 ROUTINE GENERAL MEDICAL EXAMINATION AT A HEALTH CARE FACILITY: ICD-10-CM

## 2021-03-01 DIAGNOSIS — E11.69 HYPERLIPIDEMIA ASSOCIATED WITH TYPE 2 DIABETES MELLITUS: ICD-10-CM

## 2021-03-01 LAB
BILIRUB SERPL-MCNC: NORMAL MG/DL
BLOOD URINE, POC: NORMAL
CLARITY, POC UA: CLEAR
COLOR, POC UA: NORMAL
GLUCOSE UR QL STRIP: 250
KETONES UR QL STRIP: NORMAL
LEUKOCYTE ESTERASE URINE, POC: NORMAL
NITRITE, POC UA: NORMAL
PH, POC UA: 5
PROTEIN, POC: NORMAL
SPECIFIC GRAVITY, POC UA: 1.03
UROBILINOGEN, POC UA: NORMAL

## 2021-03-01 PROCEDURE — 81002 POCT URINE DIPSTICK WITHOUT MICROSCOPE: ICD-10-PCS | Mod: S$GLB,,, | Performed by: FAMILY MEDICINE

## 2021-03-01 PROCEDURE — 99396 PREV VISIT EST AGE 40-64: CPT | Mod: 25,S$GLB,, | Performed by: FAMILY MEDICINE

## 2021-03-01 PROCEDURE — 81002 URINALYSIS NONAUTO W/O SCOPE: CPT | Mod: S$GLB,,, | Performed by: FAMILY MEDICINE

## 2021-03-01 PROCEDURE — 1125F PR PAIN SEVERITY QUANTIFIED, PAIN PRESENT: ICD-10-PCS | Mod: S$GLB,,, | Performed by: FAMILY MEDICINE

## 2021-03-01 PROCEDURE — 1125F AMNT PAIN NOTED PAIN PRSNT: CPT | Mod: S$GLB,,, | Performed by: FAMILY MEDICINE

## 2021-03-01 PROCEDURE — 3075F PR MOST RECENT SYSTOLIC BLOOD PRESS GE 130-139MM HG: ICD-10-PCS | Mod: CPTII,S$GLB,, | Performed by: FAMILY MEDICINE

## 2021-03-01 PROCEDURE — 99396 PR PREVENTIVE VISIT,EST,40-64: ICD-10-PCS | Mod: 25,S$GLB,, | Performed by: FAMILY MEDICINE

## 2021-03-01 PROCEDURE — 3008F PR BODY MASS INDEX (BMI) DOCUMENTED: ICD-10-PCS | Mod: CPTII,S$GLB,, | Performed by: FAMILY MEDICINE

## 2021-03-01 PROCEDURE — 87086 URINE CULTURE/COLONY COUNT: CPT

## 2021-03-01 PROCEDURE — 3008F BODY MASS INDEX DOCD: CPT | Mod: CPTII,S$GLB,, | Performed by: FAMILY MEDICINE

## 2021-03-01 PROCEDURE — 3072F PR LOW RISK FOR RETINOPATHY: ICD-10-PCS | Mod: S$GLB,,, | Performed by: FAMILY MEDICINE

## 2021-03-01 PROCEDURE — 3078F PR MOST RECENT DIASTOLIC BLOOD PRESSURE < 80 MM HG: ICD-10-PCS | Mod: CPTII,S$GLB,, | Performed by: FAMILY MEDICINE

## 2021-03-01 PROCEDURE — 3075F SYST BP GE 130 - 139MM HG: CPT | Mod: CPTII,S$GLB,, | Performed by: FAMILY MEDICINE

## 2021-03-01 PROCEDURE — 99999 PR PBB SHADOW E&M-EST. PATIENT-LVL III: ICD-10-PCS | Mod: PBBFAC,,, | Performed by: FAMILY MEDICINE

## 2021-03-01 PROCEDURE — 99999 PR PBB SHADOW E&M-EST. PATIENT-LVL III: CPT | Mod: PBBFAC,,, | Performed by: FAMILY MEDICINE

## 2021-03-01 PROCEDURE — 3072F LOW RISK FOR RETINOPATHY: CPT | Mod: S$GLB,,, | Performed by: FAMILY MEDICINE

## 2021-03-01 PROCEDURE — 3078F DIAST BP <80 MM HG: CPT | Mod: CPTII,S$GLB,, | Performed by: FAMILY MEDICINE

## 2021-03-01 PROCEDURE — 3046F HEMOGLOBIN A1C LEVEL >9.0%: CPT | Mod: CPTII,S$GLB,, | Performed by: FAMILY MEDICINE

## 2021-03-01 PROCEDURE — 3046F PR MOST RECENT HEMOGLOBIN A1C LEVEL > 9.0%: ICD-10-PCS | Mod: CPTII,S$GLB,, | Performed by: FAMILY MEDICINE

## 2021-03-01 RX ORDER — LANCETS 28 GAUGE
EACH MISCELLANEOUS
Qty: 100 EACH | Refills: 3 | Status: SHIPPED | OUTPATIENT
Start: 2021-03-01 | End: 2022-08-05

## 2021-03-01 RX ORDER — INSULIN PUMP SYRINGE, 3 ML
EACH MISCELLANEOUS
Qty: 1 EACH | Refills: 0 | Status: SHIPPED | OUTPATIENT
Start: 2021-03-01 | End: 2022-08-05

## 2021-03-01 RX ORDER — FLUCONAZOLE 150 MG/1
150 TABLET ORAL
Qty: 3 TABLET | Refills: 3 | Status: SHIPPED | OUTPATIENT
Start: 2021-03-01 | End: 2021-04-01

## 2021-03-01 RX ORDER — DULAGLUTIDE 0.75 MG/.5ML
0.75 INJECTION, SOLUTION SUBCUTANEOUS
Qty: 2 ML | Refills: 11 | Status: SHIPPED | OUTPATIENT
Start: 2021-03-01 | End: 2021-05-04

## 2021-03-01 RX ORDER — LOSARTAN POTASSIUM 25 MG/1
25 TABLET ORAL NIGHTLY
Qty: 90 TABLET | Refills: 3 | Status: SHIPPED | OUTPATIENT
Start: 2021-03-01 | End: 2021-05-04 | Stop reason: SDUPTHER

## 2021-03-02 LAB — BACTERIA UR CULT: NORMAL

## 2021-03-10 ENCOUNTER — TELEPHONE (OUTPATIENT)
Dept: ADMINISTRATIVE | Facility: HOSPITAL | Age: 61
End: 2021-03-10

## 2021-04-01 ENCOUNTER — OFFICE VISIT (OUTPATIENT)
Dept: PRIMARY CARE CLINIC | Facility: CLINIC | Age: 61
End: 2021-04-01
Payer: COMMERCIAL

## 2021-04-01 DIAGNOSIS — G57.10 MERALGIA PARESTHETICA, UNSPECIFIED LATERALITY: ICD-10-CM

## 2021-04-01 DIAGNOSIS — E11.69 HYPERLIPIDEMIA ASSOCIATED WITH TYPE 2 DIABETES MELLITUS: ICD-10-CM

## 2021-04-01 DIAGNOSIS — Z91.199 NONCOMPLIANCE WITH DIABETES TREATMENT: ICD-10-CM

## 2021-04-01 DIAGNOSIS — I15.2 HYPERTENSION ASSOCIATED WITH DIABETES: ICD-10-CM

## 2021-04-01 DIAGNOSIS — E66.01 CLASS 2 SEVERE OBESITY DUE TO EXCESS CALORIES WITH SERIOUS COMORBIDITY AND BODY MASS INDEX (BMI) OF 36.0 TO 36.9 IN ADULT: ICD-10-CM

## 2021-04-01 DIAGNOSIS — E11.59 HYPERTENSION ASSOCIATED WITH DIABETES: ICD-10-CM

## 2021-04-01 DIAGNOSIS — E03.9 HYPOTHYROIDISM, UNSPECIFIED TYPE: ICD-10-CM

## 2021-04-01 DIAGNOSIS — E78.5 HYPERLIPIDEMIA ASSOCIATED WITH TYPE 2 DIABETES MELLITUS: ICD-10-CM

## 2021-04-01 DIAGNOSIS — E11.65 UNCONTROLLED TYPE 2 DIABETES MELLITUS WITH HYPERGLYCEMIA, WITHOUT LONG-TERM CURRENT USE OF INSULIN: Primary | ICD-10-CM

## 2021-04-01 PROCEDURE — 3072F LOW RISK FOR RETINOPATHY: CPT | Mod: ,,, | Performed by: FAMILY MEDICINE

## 2021-04-01 PROCEDURE — 99214 PR OFFICE/OUTPT VISIT, EST, LEVL IV, 30-39 MIN: ICD-10-PCS | Mod: 95,,, | Performed by: FAMILY MEDICINE

## 2021-04-01 PROCEDURE — 3046F PR MOST RECENT HEMOGLOBIN A1C LEVEL > 9.0%: ICD-10-PCS | Mod: CPTII,,, | Performed by: FAMILY MEDICINE

## 2021-04-01 PROCEDURE — 3072F PR LOW RISK FOR RETINOPATHY: ICD-10-PCS | Mod: ,,, | Performed by: FAMILY MEDICINE

## 2021-04-01 PROCEDURE — 3046F HEMOGLOBIN A1C LEVEL >9.0%: CPT | Mod: CPTII,,, | Performed by: FAMILY MEDICINE

## 2021-04-01 PROCEDURE — 99214 OFFICE O/P EST MOD 30 MIN: CPT | Mod: 95,,, | Performed by: FAMILY MEDICINE

## 2021-04-01 RX ORDER — DULAGLUTIDE 1.5 MG/.5ML
1.5 INJECTION, SOLUTION SUBCUTANEOUS
Qty: 4 PEN | Refills: 11 | Status: SHIPPED | OUTPATIENT
Start: 2021-04-01 | End: 2021-08-10

## 2021-04-05 ENCOUNTER — PATIENT MESSAGE (OUTPATIENT)
Dept: PRIMARY CARE CLINIC | Facility: CLINIC | Age: 61
End: 2021-04-05

## 2021-04-08 PROBLEM — E66.01 CLASS 2 SEVERE OBESITY DUE TO EXCESS CALORIES WITH SERIOUS COMORBIDITY AND BODY MASS INDEX (BMI) OF 36.0 TO 36.9 IN ADULT: Status: ACTIVE | Noted: 2017-09-13

## 2021-04-08 PROBLEM — G57.10 MERALGIA PARESTHETICA: Status: ACTIVE | Noted: 2021-04-08

## 2021-04-08 PROBLEM — Z91.199 NONCOMPLIANCE WITH DIABETES TREATMENT: Status: ACTIVE | Noted: 2021-04-08

## 2021-04-08 PROBLEM — E78.5 HYPERLIPIDEMIA ASSOCIATED WITH TYPE 2 DIABETES MELLITUS: Status: ACTIVE | Noted: 2021-04-08

## 2021-04-08 PROBLEM — E11.69 HYPERLIPIDEMIA ASSOCIATED WITH TYPE 2 DIABETES MELLITUS: Status: ACTIVE | Noted: 2021-04-08

## 2021-04-26 ENCOUNTER — LAB VISIT (OUTPATIENT)
Dept: LAB | Facility: HOSPITAL | Age: 61
End: 2021-04-26
Attending: FAMILY MEDICINE
Payer: COMMERCIAL

## 2021-04-26 DIAGNOSIS — I15.2 HYPERTENSION ASSOCIATED WITH DIABETES: ICD-10-CM

## 2021-04-26 DIAGNOSIS — E03.9 HYPOTHYROIDISM, UNSPECIFIED TYPE: ICD-10-CM

## 2021-04-26 DIAGNOSIS — E11.59 HYPERTENSION ASSOCIATED WITH DIABETES: ICD-10-CM

## 2021-04-26 DIAGNOSIS — E11.65 UNCONTROLLED TYPE 2 DIABETES MELLITUS WITH HYPERGLYCEMIA, WITHOUT LONG-TERM CURRENT USE OF INSULIN: ICD-10-CM

## 2021-04-26 LAB
CHOLEST SERPL-MCNC: 161 MG/DL (ref 120–199)
CHOLEST/HDLC SERPL: 5 {RATIO} (ref 2–5)
ESTIMATED AVG GLUCOSE: 189 MG/DL (ref 68–131)
HBA1C MFR BLD: 8.2 % (ref 4–5.6)
HDLC SERPL-MCNC: 32 MG/DL (ref 40–75)
HDLC SERPL: 19.9 % (ref 20–50)
INSULIN COLLECTION INTERVAL: NORMAL
INSULIN SERPL-ACNC: 16.8 UU/ML
LDLC SERPL CALC-MCNC: 105.2 MG/DL (ref 63–159)
NONHDLC SERPL-MCNC: 129 MG/DL
T4 FREE SERPL-MCNC: 1.35 NG/DL (ref 0.71–1.51)
TRIGL SERPL-MCNC: 119 MG/DL (ref 30–150)
TSH SERPL DL<=0.005 MIU/L-ACNC: 0.17 UIU/ML (ref 0.4–4)

## 2021-04-26 PROCEDURE — 80061 LIPID PANEL: CPT | Performed by: FAMILY MEDICINE

## 2021-04-26 PROCEDURE — 84439 ASSAY OF FREE THYROXINE: CPT | Performed by: FAMILY MEDICINE

## 2021-04-26 PROCEDURE — 83525 ASSAY OF INSULIN: CPT | Performed by: FAMILY MEDICINE

## 2021-04-26 PROCEDURE — 83036 HEMOGLOBIN GLYCOSYLATED A1C: CPT | Performed by: FAMILY MEDICINE

## 2021-04-26 PROCEDURE — 84443 ASSAY THYROID STIM HORMONE: CPT | Performed by: FAMILY MEDICINE

## 2021-04-28 ENCOUNTER — TELEPHONE (OUTPATIENT)
Dept: ADMINISTRATIVE | Facility: HOSPITAL | Age: 61
End: 2021-04-28

## 2021-05-04 ENCOUNTER — OFFICE VISIT (OUTPATIENT)
Dept: PRIMARY CARE CLINIC | Facility: CLINIC | Age: 61
End: 2021-05-04
Payer: COMMERCIAL

## 2021-05-04 VITALS — BODY MASS INDEX: 35.49 KG/M2 | HEIGHT: 65 IN | WEIGHT: 213 LBS

## 2021-05-04 DIAGNOSIS — E11.65 UNCONTROLLED TYPE 2 DIABETES MELLITUS WITH HYPERGLYCEMIA, WITHOUT LONG-TERM CURRENT USE OF INSULIN: Primary | ICD-10-CM

## 2021-05-04 DIAGNOSIS — E11.59 HYPERTENSION ASSOCIATED WITH DIABETES: ICD-10-CM

## 2021-05-04 DIAGNOSIS — E66.01 CLASS 2 SEVERE OBESITY DUE TO EXCESS CALORIES WITH SERIOUS COMORBIDITY AND BODY MASS INDEX (BMI) OF 36.0 TO 36.9 IN ADULT: ICD-10-CM

## 2021-05-04 DIAGNOSIS — E78.5 HYPERLIPIDEMIA ASSOCIATED WITH TYPE 2 DIABETES MELLITUS: ICD-10-CM

## 2021-05-04 DIAGNOSIS — I15.2 HYPERTENSION ASSOCIATED WITH DIABETES: ICD-10-CM

## 2021-05-04 DIAGNOSIS — G57.10 MERALGIA PARESTHETICA, UNSPECIFIED LATERALITY: ICD-10-CM

## 2021-05-04 DIAGNOSIS — E11.69 HYPERLIPIDEMIA ASSOCIATED WITH TYPE 2 DIABETES MELLITUS: ICD-10-CM

## 2021-05-04 DIAGNOSIS — E03.9 HYPOTHYROIDISM, UNSPECIFIED TYPE: ICD-10-CM

## 2021-05-04 PROCEDURE — 3008F BODY MASS INDEX DOCD: CPT | Mod: CPTII,,, | Performed by: FAMILY MEDICINE

## 2021-05-04 PROCEDURE — 3052F HG A1C>EQUAL 8.0%<EQUAL 9.0%: CPT | Mod: CPTII,,, | Performed by: FAMILY MEDICINE

## 2021-05-04 PROCEDURE — 99214 OFFICE O/P EST MOD 30 MIN: CPT | Mod: 95,,, | Performed by: FAMILY MEDICINE

## 2021-05-04 PROCEDURE — 3008F PR BODY MASS INDEX (BMI) DOCUMENTED: ICD-10-PCS | Mod: CPTII,,, | Performed by: FAMILY MEDICINE

## 2021-05-04 PROCEDURE — 99214 PR OFFICE/OUTPT VISIT, EST, LEVL IV, 30-39 MIN: ICD-10-PCS | Mod: 95,,, | Performed by: FAMILY MEDICINE

## 2021-05-04 PROCEDURE — 3072F LOW RISK FOR RETINOPATHY: CPT | Mod: ,,, | Performed by: FAMILY MEDICINE

## 2021-05-04 PROCEDURE — 3072F PR LOW RISK FOR RETINOPATHY: ICD-10-PCS | Mod: ,,, | Performed by: FAMILY MEDICINE

## 2021-05-04 PROCEDURE — 3052F PR MOST RECENT HEMOGLOBIN A1C LEVEL 8.0 - < 9.0%: ICD-10-PCS | Mod: CPTII,,, | Performed by: FAMILY MEDICINE

## 2021-05-04 RX ORDER — LOSARTAN POTASSIUM 25 MG/1
25 TABLET ORAL NIGHTLY
Qty: 90 TABLET | Refills: 3 | Status: SHIPPED | OUTPATIENT
Start: 2021-05-04 | End: 2022-07-05 | Stop reason: SDUPTHER

## 2021-05-24 ENCOUNTER — HOSPITAL ENCOUNTER (OUTPATIENT)
Dept: RADIOLOGY | Facility: HOSPITAL | Age: 61
Discharge: HOME | End: 2021-05-24
Attending: FAMILY MEDICINE
Payer: COMMERCIAL

## 2021-05-24 DIAGNOSIS — Z12.31 OTHER SCREENING MAMMOGRAM: ICD-10-CM

## 2021-05-24 PROCEDURE — 77063 MAMMO DIGITAL SCREENING BILAT WITH TOMO: ICD-10-PCS | Mod: 26,,, | Performed by: RADIOLOGY

## 2021-05-24 PROCEDURE — 77067 SCR MAMMO BI INCL CAD: CPT | Mod: 26,,, | Performed by: RADIOLOGY

## 2021-05-24 PROCEDURE — 77067 MAMMO DIGITAL SCREENING BILAT WITH TOMO: ICD-10-PCS | Mod: 26,,, | Performed by: RADIOLOGY

## 2021-05-24 PROCEDURE — 77063 BREAST TOMOSYNTHESIS BI: CPT | Mod: 26,,, | Performed by: RADIOLOGY

## 2021-05-24 PROCEDURE — 77067 SCR MAMMO BI INCL CAD: CPT | Mod: TC

## 2021-06-29 ENCOUNTER — LAB VISIT (OUTPATIENT)
Dept: LAB | Facility: HOSPITAL | Age: 61
End: 2021-06-29
Attending: FAMILY MEDICINE
Payer: COMMERCIAL

## 2021-06-29 DIAGNOSIS — E03.9 HYPOTHYROIDISM, UNSPECIFIED TYPE: ICD-10-CM

## 2021-06-29 DIAGNOSIS — E78.5 HYPERLIPIDEMIA ASSOCIATED WITH TYPE 2 DIABETES MELLITUS: ICD-10-CM

## 2021-06-29 DIAGNOSIS — E11.65 UNCONTROLLED TYPE 2 DIABETES MELLITUS WITH HYPERGLYCEMIA, WITHOUT LONG-TERM CURRENT USE OF INSULIN: ICD-10-CM

## 2021-06-29 DIAGNOSIS — E66.01 CLASS 2 SEVERE OBESITY DUE TO EXCESS CALORIES WITH SERIOUS COMORBIDITY AND BODY MASS INDEX (BMI) OF 36.0 TO 36.9 IN ADULT: ICD-10-CM

## 2021-06-29 DIAGNOSIS — E11.69 HYPERLIPIDEMIA ASSOCIATED WITH TYPE 2 DIABETES MELLITUS: ICD-10-CM

## 2021-06-29 LAB
ALBUMIN SERPL BCP-MCNC: 3.6 G/DL (ref 3.5–5.2)
ALP SERPL-CCNC: 60 U/L (ref 55–135)
ALT SERPL W/O P-5'-P-CCNC: 16 U/L (ref 10–44)
ANION GAP SERPL CALC-SCNC: 9 MMOL/L (ref 8–16)
AST SERPL-CCNC: 17 U/L (ref 10–40)
BILIRUB SERPL-MCNC: 0.6 MG/DL (ref 0.1–1)
BUN SERPL-MCNC: 10 MG/DL (ref 6–20)
CALCIUM SERPL-MCNC: 9.4 MG/DL (ref 8.7–10.5)
CHLORIDE SERPL-SCNC: 108 MMOL/L (ref 95–110)
CHOLEST SERPL-MCNC: 162 MG/DL (ref 120–199)
CHOLEST/HDLC SERPL: 5.4 {RATIO} (ref 2–5)
CO2 SERPL-SCNC: 24 MMOL/L (ref 23–29)
CREAT SERPL-MCNC: 0.9 MG/DL (ref 0.5–1.4)
EST. GFR  (AFRICAN AMERICAN): >60 ML/MIN/1.73 M^2
EST. GFR  (NON AFRICAN AMERICAN): >60 ML/MIN/1.73 M^2
ESTIMATED AVG GLUCOSE: 154 MG/DL (ref 68–131)
GLUCOSE SERPL-MCNC: 130 MG/DL (ref 70–110)
HBA1C MFR BLD: 7 % (ref 4–5.6)
HDLC SERPL-MCNC: 30 MG/DL (ref 40–75)
HDLC SERPL: 18.5 % (ref 20–50)
LDLC SERPL CALC-MCNC: 104 MG/DL (ref 63–159)
NONHDLC SERPL-MCNC: 132 MG/DL
POTASSIUM SERPL-SCNC: 4.5 MMOL/L (ref 3.5–5.1)
PROT SERPL-MCNC: 7.2 G/DL (ref 6–8.4)
SODIUM SERPL-SCNC: 141 MMOL/L (ref 136–145)
T4 FREE SERPL-MCNC: 1.32 NG/DL (ref 0.71–1.51)
TRIGL SERPL-MCNC: 140 MG/DL (ref 30–150)
TSH SERPL DL<=0.005 MIU/L-ACNC: 0.1 UIU/ML (ref 0.4–4)

## 2021-06-29 PROCEDURE — 84439 ASSAY OF FREE THYROXINE: CPT | Performed by: FAMILY MEDICINE

## 2021-06-29 PROCEDURE — 84443 ASSAY THYROID STIM HORMONE: CPT | Performed by: FAMILY MEDICINE

## 2021-06-29 PROCEDURE — 80061 LIPID PANEL: CPT | Performed by: FAMILY MEDICINE

## 2021-06-29 PROCEDURE — 36415 COLL VENOUS BLD VENIPUNCTURE: CPT | Mod: PO | Performed by: FAMILY MEDICINE

## 2021-06-29 PROCEDURE — 80053 COMPREHEN METABOLIC PANEL: CPT | Performed by: FAMILY MEDICINE

## 2021-06-29 PROCEDURE — 83036 HEMOGLOBIN GLYCOSYLATED A1C: CPT | Performed by: FAMILY MEDICINE

## 2021-07-01 ENCOUNTER — OFFICE VISIT (OUTPATIENT)
Dept: PRIMARY CARE CLINIC | Facility: CLINIC | Age: 61
End: 2021-07-01
Payer: COMMERCIAL

## 2021-07-01 VITALS — WEIGHT: 212 LBS | HEIGHT: 65 IN | BODY MASS INDEX: 35.32 KG/M2

## 2021-07-01 DIAGNOSIS — E11.69 HYPERLIPIDEMIA ASSOCIATED WITH TYPE 2 DIABETES MELLITUS: ICD-10-CM

## 2021-07-01 DIAGNOSIS — I15.2 HYPERTENSION ASSOCIATED WITH DIABETES: ICD-10-CM

## 2021-07-01 DIAGNOSIS — E66.01 CLASS 2 SEVERE OBESITY DUE TO EXCESS CALORIES WITH SERIOUS COMORBIDITY AND BODY MASS INDEX (BMI) OF 36.0 TO 36.9 IN ADULT: ICD-10-CM

## 2021-07-01 DIAGNOSIS — H00.012 HORDEOLUM EXTERNUM OF RIGHT LOWER EYELID: ICD-10-CM

## 2021-07-01 DIAGNOSIS — E11.9 TYPE 2 DIABETES MELLITUS WITHOUT COMPLICATION, WITHOUT LONG-TERM CURRENT USE OF INSULIN: Primary | ICD-10-CM

## 2021-07-01 DIAGNOSIS — G57.10 MERALGIA PARESTHETICA, UNSPECIFIED LATERALITY: ICD-10-CM

## 2021-07-01 DIAGNOSIS — E03.9 HYPOTHYROIDISM, UNSPECIFIED TYPE: ICD-10-CM

## 2021-07-01 DIAGNOSIS — E78.5 HYPERLIPIDEMIA ASSOCIATED WITH TYPE 2 DIABETES MELLITUS: ICD-10-CM

## 2021-07-01 DIAGNOSIS — E11.59 HYPERTENSION ASSOCIATED WITH DIABETES: ICD-10-CM

## 2021-07-01 PROBLEM — Z91.199 NONCOMPLIANCE WITH DIABETES TREATMENT: Status: RESOLVED | Noted: 2021-04-08 | Resolved: 2021-07-01

## 2021-07-01 PROCEDURE — 3051F HG A1C>EQUAL 7.0%<8.0%: CPT | Mod: CPTII,,, | Performed by: FAMILY MEDICINE

## 2021-07-01 PROCEDURE — 3072F LOW RISK FOR RETINOPATHY: CPT | Mod: ,,, | Performed by: FAMILY MEDICINE

## 2021-07-01 PROCEDURE — 3008F PR BODY MASS INDEX (BMI) DOCUMENTED: ICD-10-PCS | Mod: CPTII,,, | Performed by: FAMILY MEDICINE

## 2021-07-01 PROCEDURE — 3008F BODY MASS INDEX DOCD: CPT | Mod: CPTII,,, | Performed by: FAMILY MEDICINE

## 2021-07-01 PROCEDURE — 99214 PR OFFICE/OUTPT VISIT, EST, LEVL IV, 30-39 MIN: ICD-10-PCS | Mod: 95,,, | Performed by: FAMILY MEDICINE

## 2021-07-01 PROCEDURE — 99214 OFFICE O/P EST MOD 30 MIN: CPT | Mod: 95,,, | Performed by: FAMILY MEDICINE

## 2021-07-01 PROCEDURE — 3072F PR LOW RISK FOR RETINOPATHY: ICD-10-PCS | Mod: ,,, | Performed by: FAMILY MEDICINE

## 2021-07-01 PROCEDURE — 3051F PR MOST RECENT HEMOGLOBIN A1C LEVEL 7.0 - < 8.0%: ICD-10-PCS | Mod: CPTII,,, | Performed by: FAMILY MEDICINE

## 2021-07-01 RX ORDER — ROSUVASTATIN CALCIUM 5 MG/1
5 TABLET, COATED ORAL DAILY
Qty: 90 TABLET | Refills: 3 | Status: SHIPPED | OUTPATIENT
Start: 2021-07-01 | End: 2022-07-05 | Stop reason: SDUPTHER

## 2021-08-09 ENCOUNTER — PATIENT MESSAGE (OUTPATIENT)
Dept: PRIMARY CARE CLINIC | Facility: CLINIC | Age: 61
End: 2021-08-09

## 2021-08-10 ENCOUNTER — PATIENT MESSAGE (OUTPATIENT)
Dept: PRIMARY CARE CLINIC | Facility: CLINIC | Age: 61
End: 2021-08-10

## 2021-08-10 RX ORDER — DULAGLUTIDE 0.75 MG/.5ML
0.75 INJECTION, SOLUTION SUBCUTANEOUS
Qty: 12 PEN | Refills: 3 | Status: SHIPPED | OUTPATIENT
Start: 2021-08-10 | End: 2022-07-05

## 2021-09-08 ENCOUNTER — PATIENT OUTREACH (OUTPATIENT)
Dept: ADMINISTRATIVE | Facility: HOSPITAL | Age: 61
End: 2021-09-08

## 2021-10-11 ENCOUNTER — LAB VISIT (OUTPATIENT)
Dept: LAB | Facility: HOSPITAL | Age: 61
End: 2021-10-11
Attending: NURSE PRACTITIONER
Payer: COMMERCIAL

## 2021-10-11 DIAGNOSIS — E11.69 HYPERLIPIDEMIA ASSOCIATED WITH TYPE 2 DIABETES MELLITUS: ICD-10-CM

## 2021-10-11 DIAGNOSIS — I15.2 HYPERTENSION ASSOCIATED WITH DIABETES: ICD-10-CM

## 2021-10-11 DIAGNOSIS — E11.59 HYPERTENSION ASSOCIATED WITH DIABETES: ICD-10-CM

## 2021-10-11 DIAGNOSIS — E03.9 HYPOTHYROIDISM, UNSPECIFIED TYPE: ICD-10-CM

## 2021-10-11 DIAGNOSIS — E78.5 HYPERLIPIDEMIA ASSOCIATED WITH TYPE 2 DIABETES MELLITUS: ICD-10-CM

## 2021-10-11 DIAGNOSIS — E11.9 TYPE 2 DIABETES MELLITUS WITHOUT COMPLICATION, WITHOUT LONG-TERM CURRENT USE OF INSULIN: ICD-10-CM

## 2021-10-11 DIAGNOSIS — H00.012 HORDEOLUM EXTERNUM OF RIGHT LOWER EYELID: ICD-10-CM

## 2021-10-11 LAB
ALBUMIN SERPL BCP-MCNC: 3.7 G/DL (ref 3.5–5.2)
ALP SERPL-CCNC: 58 U/L (ref 55–135)
ALT SERPL W/O P-5'-P-CCNC: 17 U/L (ref 10–44)
ANION GAP SERPL CALC-SCNC: 13 MMOL/L (ref 8–16)
AST SERPL-CCNC: 18 U/L (ref 10–40)
BASOPHILS # BLD AUTO: 0.04 K/UL (ref 0–0.2)
BASOPHILS NFR BLD: 0.5 % (ref 0–1.9)
BILIRUB SERPL-MCNC: 0.8 MG/DL (ref 0.1–1)
BUN SERPL-MCNC: 12 MG/DL (ref 6–20)
CALCIUM SERPL-MCNC: 9.7 MG/DL (ref 8.7–10.5)
CHLORIDE SERPL-SCNC: 103 MMOL/L (ref 95–110)
CHOLEST SERPL-MCNC: 173 MG/DL (ref 120–199)
CHOLEST/HDLC SERPL: 4.9 {RATIO} (ref 2–5)
CO2 SERPL-SCNC: 22 MMOL/L (ref 23–29)
CREAT SERPL-MCNC: 0.8 MG/DL (ref 0.5–1.4)
DIFFERENTIAL METHOD: ABNORMAL
EOSINOPHIL # BLD AUTO: 0.2 K/UL (ref 0–0.5)
EOSINOPHIL NFR BLD: 1.8 % (ref 0–8)
ERYTHROCYTE [DISTWIDTH] IN BLOOD BY AUTOMATED COUNT: 13.2 % (ref 11.5–14.5)
EST. GFR  (AFRICAN AMERICAN): >60 ML/MIN/1.73 M^2
EST. GFR  (NON AFRICAN AMERICAN): >60 ML/MIN/1.73 M^2
ESTIMATED AVG GLUCOSE: 180 MG/DL (ref 68–131)
GLUCOSE SERPL-MCNC: 161 MG/DL (ref 70–110)
HBA1C MFR BLD: 7.9 % (ref 4–5.6)
HCT VFR BLD AUTO: 39.7 % (ref 37–48.5)
HDLC SERPL-MCNC: 35 MG/DL (ref 40–75)
HDLC SERPL: 20.2 % (ref 20–50)
HGB BLD-MCNC: 13 G/DL (ref 12–16)
IMM GRANULOCYTES # BLD AUTO: 0.03 K/UL (ref 0–0.04)
IMM GRANULOCYTES NFR BLD AUTO: 0.4 % (ref 0–0.5)
LDLC SERPL CALC-MCNC: 109.4 MG/DL (ref 63–159)
LYMPHOCYTES # BLD AUTO: 2.1 K/UL (ref 1–4.8)
LYMPHOCYTES NFR BLD: 25.2 % (ref 18–48)
MCH RBC QN AUTO: 29 PG (ref 27–31)
MCHC RBC AUTO-ENTMCNC: 32.7 G/DL (ref 32–36)
MCV RBC AUTO: 89 FL (ref 82–98)
MONOCYTES # BLD AUTO: 0.4 K/UL (ref 0.3–1)
MONOCYTES NFR BLD: 5.1 % (ref 4–15)
NEUTROPHILS # BLD AUTO: 5.5 K/UL (ref 1.8–7.7)
NEUTROPHILS NFR BLD: 67 % (ref 38–73)
NONHDLC SERPL-MCNC: 138 MG/DL
NRBC BLD-RTO: 0 /100 WBC
PLATELET # BLD AUTO: 186 K/UL (ref 150–450)
PMV BLD AUTO: 13.2 FL (ref 9.2–12.9)
POTASSIUM SERPL-SCNC: 4.1 MMOL/L (ref 3.5–5.1)
PROT SERPL-MCNC: 7.3 G/DL (ref 6–8.4)
RBC # BLD AUTO: 4.48 M/UL (ref 4–5.4)
SODIUM SERPL-SCNC: 138 MMOL/L (ref 136–145)
T4 FREE SERPL-MCNC: 1.26 NG/DL (ref 0.71–1.51)
TRIGL SERPL-MCNC: 143 MG/DL (ref 30–150)
TSH SERPL DL<=0.005 MIU/L-ACNC: 0.08 UIU/ML (ref 0.4–4)
WBC # BLD AUTO: 8.2 K/UL (ref 3.9–12.7)

## 2021-10-11 PROCEDURE — 80061 LIPID PANEL: CPT | Performed by: FAMILY MEDICINE

## 2021-10-11 PROCEDURE — 85025 COMPLETE CBC W/AUTO DIFF WBC: CPT | Performed by: FAMILY MEDICINE

## 2021-10-11 PROCEDURE — 83525 ASSAY OF INSULIN: CPT | Performed by: FAMILY MEDICINE

## 2021-10-11 PROCEDURE — 80053 COMPREHEN METABOLIC PANEL: CPT | Performed by: FAMILY MEDICINE

## 2021-10-11 PROCEDURE — 84443 ASSAY THYROID STIM HORMONE: CPT | Performed by: FAMILY MEDICINE

## 2021-10-11 PROCEDURE — 83036 HEMOGLOBIN GLYCOSYLATED A1C: CPT | Performed by: FAMILY MEDICINE

## 2021-10-11 PROCEDURE — 36415 COLL VENOUS BLD VENIPUNCTURE: CPT | Mod: PO | Performed by: FAMILY MEDICINE

## 2021-10-11 PROCEDURE — 84439 ASSAY OF FREE THYROXINE: CPT | Performed by: FAMILY MEDICINE

## 2021-10-12 LAB
INSULIN COLLECTION INTERVAL: NORMAL
INSULIN SERPL-ACNC: 16.2 UU/ML

## 2021-10-18 ENCOUNTER — OFFICE VISIT (OUTPATIENT)
Dept: PRIMARY CARE CLINIC | Facility: CLINIC | Age: 61
End: 2021-10-18
Payer: COMMERCIAL

## 2021-10-18 DIAGNOSIS — E11.69 HYPERLIPIDEMIA ASSOCIATED WITH TYPE 2 DIABETES MELLITUS: ICD-10-CM

## 2021-10-18 DIAGNOSIS — I15.2 HYPERTENSION ASSOCIATED WITH DIABETES: ICD-10-CM

## 2021-10-18 DIAGNOSIS — E03.9 HYPOTHYROIDISM, UNSPECIFIED TYPE: ICD-10-CM

## 2021-10-18 DIAGNOSIS — E11.9 TYPE 2 DIABETES MELLITUS WITHOUT COMPLICATION, WITHOUT LONG-TERM CURRENT USE OF INSULIN: ICD-10-CM

## 2021-10-18 DIAGNOSIS — E11.65 UNCONTROLLED TYPE 2 DIABETES MELLITUS WITH HYPERGLYCEMIA, WITHOUT LONG-TERM CURRENT USE OF INSULIN: Primary | ICD-10-CM

## 2021-10-18 DIAGNOSIS — E78.5 HYPERLIPIDEMIA ASSOCIATED WITH TYPE 2 DIABETES MELLITUS: ICD-10-CM

## 2021-10-18 DIAGNOSIS — E66.01 CLASS 2 SEVERE OBESITY DUE TO EXCESS CALORIES WITH SERIOUS COMORBIDITY AND BODY MASS INDEX (BMI) OF 36.0 TO 36.9 IN ADULT: ICD-10-CM

## 2021-10-18 DIAGNOSIS — E11.59 HYPERTENSION ASSOCIATED WITH DIABETES: ICD-10-CM

## 2021-10-18 PROCEDURE — 1160F RVW MEDS BY RX/DR IN RCRD: CPT | Mod: CPTII,95,, | Performed by: FAMILY MEDICINE

## 2021-10-18 PROCEDURE — 3066F NEPHROPATHY DOC TX: CPT | Mod: CPTII,95,, | Performed by: FAMILY MEDICINE

## 2021-10-18 PROCEDURE — 3061F NEG MICROALBUMINURIA REV: CPT | Mod: CPTII,95,, | Performed by: FAMILY MEDICINE

## 2021-10-18 PROCEDURE — 4010F ACE/ARB THERAPY RXD/TAKEN: CPT | Mod: CPTII,95,, | Performed by: FAMILY MEDICINE

## 2021-10-18 PROCEDURE — 99214 OFFICE O/P EST MOD 30 MIN: CPT | Mod: 95,,, | Performed by: FAMILY MEDICINE

## 2021-10-18 PROCEDURE — 3072F LOW RISK FOR RETINOPATHY: CPT | Mod: CPTII,95,, | Performed by: FAMILY MEDICINE

## 2021-10-18 PROCEDURE — 1159F PR MEDICATION LIST DOCUMENTED IN MEDICAL RECORD: ICD-10-PCS | Mod: CPTII,95,, | Performed by: FAMILY MEDICINE

## 2021-10-18 PROCEDURE — 3051F PR MOST RECENT HEMOGLOBIN A1C LEVEL 7.0 - < 8.0%: ICD-10-PCS | Mod: CPTII,95,, | Performed by: FAMILY MEDICINE

## 2021-10-18 PROCEDURE — 99214 PR OFFICE/OUTPT VISIT, EST, LEVL IV, 30-39 MIN: ICD-10-PCS | Mod: 95,,, | Performed by: FAMILY MEDICINE

## 2021-10-18 PROCEDURE — 3061F PR NEG MICROALBUMINURIA RESULT DOCUMENTED/REVIEW: ICD-10-PCS | Mod: CPTII,95,, | Performed by: FAMILY MEDICINE

## 2021-10-18 PROCEDURE — 3066F PR DOCUMENTATION OF TREATMENT FOR NEPHROPATHY: ICD-10-PCS | Mod: CPTII,95,, | Performed by: FAMILY MEDICINE

## 2021-10-18 PROCEDURE — 4010F PR ACE/ARB THEARPY RXD/TAKEN: ICD-10-PCS | Mod: CPTII,95,, | Performed by: FAMILY MEDICINE

## 2021-10-18 PROCEDURE — 1159F MED LIST DOCD IN RCRD: CPT | Mod: CPTII,95,, | Performed by: FAMILY MEDICINE

## 2021-10-18 PROCEDURE — 1160F PR REVIEW ALL MEDS BY PRESCRIBER/CLIN PHARMACIST DOCUMENTED: ICD-10-PCS | Mod: CPTII,95,, | Performed by: FAMILY MEDICINE

## 2021-10-18 PROCEDURE — 3072F PR LOW RISK FOR RETINOPATHY: ICD-10-PCS | Mod: CPTII,95,, | Performed by: FAMILY MEDICINE

## 2021-10-18 PROCEDURE — 3051F HG A1C>EQUAL 7.0%<8.0%: CPT | Mod: CPTII,95,, | Performed by: FAMILY MEDICINE

## 2021-10-18 RX ORDER — ONDANSETRON 4 MG/1
4-8 TABLET, ORALLY DISINTEGRATING ORAL EVERY 6 HOURS PRN
Qty: 9 EACH | Refills: 1 | Status: SHIPPED | OUTPATIENT
Start: 2021-10-18 | End: 2022-08-05

## 2021-10-18 RX ORDER — SEMAGLUTIDE 1.34 MG/ML
1 INJECTION, SOLUTION SUBCUTANEOUS
Qty: 2 PEN | Refills: 11 | Status: SHIPPED | OUTPATIENT
Start: 2021-10-18 | End: 2022-07-05

## 2022-02-23 DIAGNOSIS — E03.9 HYPOTHYROIDISM, UNSPECIFIED TYPE: ICD-10-CM

## 2022-02-23 RX ORDER — LEVOTHYROXINE SODIUM 175 UG/1
175 TABLET ORAL DAILY
Qty: 90 TABLET | Refills: 3 | OUTPATIENT
Start: 2022-02-23

## 2022-02-23 NOTE — TELEPHONE ENCOUNTER
----- Message from Renuka Vieyra sent at 2/23/2022  8:16 AM CST -----  Regarding: reill  Contact: pt  1. What is the name of the medication you are requesting? Thyroid meds  2. What is the dose? n/a  3. How do you take the medication? Orally, topically, etc? n/a  4. How often do you take this medication? n/a  5. Do you need a 30 day or 90 day supply? n/a  6. How many refills are you requesting? n/a  7. What is your preferred pharmacy and location of the pharmacy? [facility] on [address]  8. Who can we contact with further questions? The pt at [phone]

## 2022-02-23 NOTE — TELEPHONE ENCOUNTER
No new care gaps identified.  Powered by [employer]. Reference number: [id number].   2/23/2022 9:00:43 AM CST

## 2022-04-11 ENCOUNTER — PATIENT MESSAGE (OUTPATIENT)
Dept: ADMINISTRATIVE | Facility: HOSPITAL | Age: 62
End: 2022-04-11
Payer: COMMERCIAL

## 2022-04-14 DIAGNOSIS — E11.9 TYPE 2 DIABETES MELLITUS WITHOUT COMPLICATION: ICD-10-CM

## 2022-04-26 ENCOUNTER — PATIENT MESSAGE (OUTPATIENT)
Dept: ADMINISTRATIVE | Facility: HOSPITAL | Age: 62
End: 2022-04-26
Payer: COMMERCIAL

## 2022-05-25 DIAGNOSIS — Z12.31 ENCOUNTER FOR SCREENING MAMMOGRAM FOR BREAST CANCER: Primary | ICD-10-CM

## 2022-05-25 DIAGNOSIS — E03.9 HYPOTHYROIDISM, UNSPECIFIED TYPE: ICD-10-CM

## 2022-05-25 RX ORDER — LEVOTHYROXINE SODIUM 175 UG/1
TABLET ORAL
Qty: 90 TABLET | Refills: 0 | Status: SHIPPED | OUTPATIENT
Start: 2022-05-25 | End: 2022-08-05 | Stop reason: SDUPTHER

## 2022-05-25 NOTE — TELEPHONE ENCOUNTER
----- Message from Rebecca Dickinson sent at 5/25/2022  2:24 PM CDT -----  Type:  RX Refill Request    Who Called: pt  Refill or New Rx:refill  RX Name and Strength:EUTHYROX 175 mcg   How is the patient currently taking it? (ex. 1XDay):1XDay  Is this a 30 day or 90 day RX:30  Preferred Pharmacy with phone number:.  [facility] - [place] - [address]   [address] [address]  Phone: [phone] Fax: [phone]  Local or Mail Order:local  Ordering Provider:Dr Farmer  Would the patient rather a call back or a response via [facility]? call  Best Call Back Number:[phone]   Additional Information: States she is leaving for [place] on Friday. States she would like to get a full panel lab work done. She has a lab appt on 6/20. She would also like to schedule her mammogram on 6/20.    Thank you

## 2022-06-03 ENCOUNTER — TELEPHONE (OUTPATIENT)
Dept: PRIMARY CARE CLINIC | Facility: CLINIC | Age: 62
End: 2022-06-03
Payer: COMMERCIAL

## 2022-06-03 NOTE — TELEPHONE ENCOUNTER
----- Message from Manuel Donahue sent at 6/2/2022  4:43 PM CDT -----  Contact: [phone]   Pt is calling in, she is wanting a call back, she is wanting to get a note for a cosmetic procedure, please call pt back, thanks

## 2022-06-17 ENCOUNTER — TELEPHONE (OUTPATIENT)
Dept: PRIMARY CARE CLINIC | Facility: CLINIC | Age: 62
End: 2022-06-17
Payer: COMMERCIAL

## 2022-06-17 NOTE — TELEPHONE ENCOUNTER
----- Message from Laura Okeefegabrielconcepcion sent at 6/17/2022  8:24 AM CDT -----  Contact: Mary  Type:  Patient Returning Call    Who Called:Mary  Who Left Message for Patient:unknown  Does the patient know what this is regarding?:positive Covid results  Would the patient rather a call back or a response via [facility]? call  Best Call Back Number:[phone]  Additional Information: Patient reports missing a call and request another call back. Please give patient an immediate call back as requested.   Thank you,  GH

## 2022-06-17 NOTE — TELEPHONE ENCOUNTER
----- Message from Connie Salter sent at 6/16/2022  3:58 PM CDT -----  Contact: Patient, [phone]  Calling to speak with the nurse regarding she tested positive for Covid 19 3 days a go. Requesting an antibiotic for hr sinuses. Please advise. Thanks.      [facility]  [place]

## 2022-06-30 ENCOUNTER — LAB VISIT (OUTPATIENT)
Dept: LAB | Facility: HOSPITAL | Age: 62
End: 2022-06-30
Attending: FAMILY MEDICINE
Payer: COMMERCIAL

## 2022-06-30 DIAGNOSIS — E11.59 HYPERTENSION ASSOCIATED WITH DIABETES: ICD-10-CM

## 2022-06-30 DIAGNOSIS — E11.65 UNCONTROLLED TYPE 2 DIABETES MELLITUS WITH HYPERGLYCEMIA, WITHOUT LONG-TERM CURRENT USE OF INSULIN: ICD-10-CM

## 2022-06-30 DIAGNOSIS — E78.5 HYPERLIPIDEMIA ASSOCIATED WITH TYPE 2 DIABETES MELLITUS: ICD-10-CM

## 2022-06-30 DIAGNOSIS — I15.2 HYPERTENSION ASSOCIATED WITH DIABETES: ICD-10-CM

## 2022-06-30 DIAGNOSIS — E11.69 HYPERLIPIDEMIA ASSOCIATED WITH TYPE 2 DIABETES MELLITUS: ICD-10-CM

## 2022-06-30 DIAGNOSIS — E03.9 HYPOTHYROIDISM, UNSPECIFIED TYPE: ICD-10-CM

## 2022-06-30 DIAGNOSIS — E66.01 CLASS 2 SEVERE OBESITY DUE TO EXCESS CALORIES WITH SERIOUS COMORBIDITY AND BODY MASS INDEX (BMI) OF 36.0 TO 36.9 IN ADULT: ICD-10-CM

## 2022-06-30 LAB
ALBUMIN SERPL BCP-MCNC: 3.8 G/DL (ref 3.5–5.2)
ALP SERPL-CCNC: 68 U/L (ref 55–135)
ALT SERPL W/O P-5'-P-CCNC: 25 U/L (ref 10–44)
ANION GAP SERPL CALC-SCNC: 7 MMOL/L (ref 8–16)
AST SERPL-CCNC: 21 U/L (ref 10–40)
BILIRUB SERPL-MCNC: 0.6 MG/DL (ref 0.1–1)
BUN SERPL-MCNC: 12 MG/DL (ref 8–23)
CALCIUM SERPL-MCNC: 9.3 MG/DL (ref 8.7–10.5)
CHLORIDE SERPL-SCNC: 104 MMOL/L (ref 95–110)
CO2 SERPL-SCNC: 27 MMOL/L (ref 23–29)
CREAT SERPL-MCNC: 0.9 MG/DL (ref 0.5–1.4)
EST. GFR  (AFRICAN AMERICAN): >60 ML/MIN/1.73 M^2
EST. GFR  (NON AFRICAN AMERICAN): >60 ML/MIN/1.73 M^2
ESTIMATED AVG GLUCOSE: 252 MG/DL (ref 68–131)
GLUCOSE SERPL-MCNC: 255 MG/DL (ref 70–110)
HBA1C MFR BLD: 10.4 % (ref 4–5.6)
INSULIN COLLECTION INTERVAL: NORMAL
INSULIN SERPL-ACNC: 15.4 UU/ML
POTASSIUM SERPL-SCNC: 4.6 MMOL/L (ref 3.5–5.1)
PROT SERPL-MCNC: 7 G/DL (ref 6–8.4)
SODIUM SERPL-SCNC: 138 MMOL/L (ref 136–145)
T4 FREE SERPL-MCNC: 1.25 NG/DL (ref 0.71–1.51)
TSH SERPL DL<=0.005 MIU/L-ACNC: 1.09 UIU/ML (ref 0.4–4)

## 2022-06-30 PROCEDURE — 84439 ASSAY OF FREE THYROXINE: CPT | Performed by: FAMILY MEDICINE

## 2022-06-30 PROCEDURE — 83525 ASSAY OF INSULIN: CPT | Performed by: FAMILY MEDICINE

## 2022-06-30 PROCEDURE — 84443 ASSAY THYROID STIM HORMONE: CPT | Performed by: FAMILY MEDICINE

## 2022-06-30 PROCEDURE — 80053 COMPREHEN METABOLIC PANEL: CPT | Performed by: FAMILY MEDICINE

## 2022-06-30 PROCEDURE — 83036 HEMOGLOBIN GLYCOSYLATED A1C: CPT | Performed by: FAMILY MEDICINE

## 2022-07-05 ENCOUNTER — OFFICE VISIT (OUTPATIENT)
Dept: INTERNAL MEDICINE | Facility: CLINIC | Age: 62
End: 2022-07-05
Payer: COMMERCIAL

## 2022-07-05 VITALS — TEMPERATURE: 98 F | HEART RATE: 83 BPM | WEIGHT: 216.06 LBS | BODY MASS INDEX: 35.95 KG/M2 | OXYGEN SATURATION: 99 %

## 2022-07-05 DIAGNOSIS — Z28.9 DELAYED IMMUNIZATIONS: ICD-10-CM

## 2022-07-05 DIAGNOSIS — Z78.0 POSTMENOPAUSAL: ICD-10-CM

## 2022-07-05 DIAGNOSIS — E11.65 UNCONTROLLED TYPE 2 DIABETES MELLITUS WITH HYPERGLYCEMIA, WITHOUT LONG-TERM CURRENT USE OF INSULIN: Primary | ICD-10-CM

## 2022-07-05 PROCEDURE — 1159F PR MEDICATION LIST DOCUMENTED IN MEDICAL RECORD: ICD-10-PCS | Mod: CPTII,S$GLB,, | Performed by: FAMILY MEDICINE

## 2022-07-05 PROCEDURE — 90715 TDAP VACCINE 7 YRS/> IM: CPT | Mod: S$GLB,,, | Performed by: FAMILY MEDICINE

## 2022-07-05 PROCEDURE — 3061F NEG MICROALBUMINURIA REV: CPT | Mod: CPTII,S$GLB,, | Performed by: FAMILY MEDICINE

## 2022-07-05 PROCEDURE — 3061F PR NEG MICROALBUMINURIA RESULT DOCUMENTED/REVIEW: ICD-10-PCS | Mod: CPTII,S$GLB,, | Performed by: FAMILY MEDICINE

## 2022-07-05 PROCEDURE — 3008F PR BODY MASS INDEX (BMI) DOCUMENTED: ICD-10-PCS | Mod: CPTII,S$GLB,, | Performed by: FAMILY MEDICINE

## 2022-07-05 PROCEDURE — 90750 HZV VACC RECOMBINANT IM: CPT | Mod: S$GLB,,, | Performed by: FAMILY MEDICINE

## 2022-07-05 PROCEDURE — 99999 PR PBB SHADOW E&M-EST. PATIENT-LVL V: ICD-10-PCS | Mod: PBBFAC,,, | Performed by: FAMILY MEDICINE

## 2022-07-05 PROCEDURE — 90472 ZOSTER RECOMBINANT VACCINE: ICD-10-PCS | Mod: S$GLB,,, | Performed by: FAMILY MEDICINE

## 2022-07-05 PROCEDURE — 4010F PR ACE/ARB THEARPY RXD/TAKEN: ICD-10-PCS | Mod: CPTII,S$GLB,, | Performed by: FAMILY MEDICINE

## 2022-07-05 PROCEDURE — 90715 TDAP VACCINE GREATER THAN OR EQUAL TO 7YO IM: ICD-10-PCS | Mod: S$GLB,,, | Performed by: FAMILY MEDICINE

## 2022-07-05 PROCEDURE — 90750 ZOSTER RECOMBINANT VACCINE: ICD-10-PCS | Mod: S$GLB,,, | Performed by: FAMILY MEDICINE

## 2022-07-05 PROCEDURE — 3046F HEMOGLOBIN A1C LEVEL >9.0%: CPT | Mod: CPTII,S$GLB,, | Performed by: FAMILY MEDICINE

## 2022-07-05 PROCEDURE — 90471 TDAP VACCINE GREATER THAN OR EQUAL TO 7YO IM: ICD-10-PCS | Mod: S$GLB,,, | Performed by: FAMILY MEDICINE

## 2022-07-05 PROCEDURE — 4010F ACE/ARB THERAPY RXD/TAKEN: CPT | Mod: CPTII,S$GLB,, | Performed by: FAMILY MEDICINE

## 2022-07-05 PROCEDURE — 1159F MED LIST DOCD IN RCRD: CPT | Mod: CPTII,S$GLB,, | Performed by: FAMILY MEDICINE

## 2022-07-05 PROCEDURE — 90472 IMMUNIZATION ADMIN EACH ADD: CPT | Mod: S$GLB,,, | Performed by: FAMILY MEDICINE

## 2022-07-05 PROCEDURE — 3066F PR DOCUMENTATION OF TREATMENT FOR NEPHROPATHY: ICD-10-PCS | Mod: CPTII,S$GLB,, | Performed by: FAMILY MEDICINE

## 2022-07-05 PROCEDURE — 90471 IMMUNIZATION ADMIN: CPT | Mod: S$GLB,,, | Performed by: FAMILY MEDICINE

## 2022-07-05 PROCEDURE — 3066F NEPHROPATHY DOC TX: CPT | Mod: CPTII,S$GLB,, | Performed by: FAMILY MEDICINE

## 2022-07-05 PROCEDURE — 3008F BODY MASS INDEX DOCD: CPT | Mod: CPTII,S$GLB,, | Performed by: FAMILY MEDICINE

## 2022-07-05 PROCEDURE — 3046F PR MOST RECENT HEMOGLOBIN A1C LEVEL > 9.0%: ICD-10-PCS | Mod: CPTII,S$GLB,, | Performed by: FAMILY MEDICINE

## 2022-07-05 PROCEDURE — 99999 PR PBB SHADOW E&M-EST. PATIENT-LVL V: CPT | Mod: PBBFAC,,, | Performed by: FAMILY MEDICINE

## 2022-07-05 PROCEDURE — 99214 PR OFFICE/OUTPT VISIT, EST, LEVL IV, 30-39 MIN: ICD-10-PCS | Mod: 25,S$GLB,, | Performed by: FAMILY MEDICINE

## 2022-07-05 PROCEDURE — 99214 OFFICE O/P EST MOD 30 MIN: CPT | Mod: 25,S$GLB,, | Performed by: FAMILY MEDICINE

## 2022-07-05 RX ORDER — EMPAGLIFLOZIN 25 MG/1
25 TABLET, FILM COATED ORAL DAILY
Qty: 90 TABLET | Refills: 0 | Status: SHIPPED | OUTPATIENT
Start: 2022-07-05 | End: 2022-08-05

## 2022-07-05 RX ORDER — SEMAGLUTIDE 1.34 MG/ML
INJECTION, SOLUTION SUBCUTANEOUS
Qty: 3 PEN | Refills: 0 | Status: SHIPPED | OUTPATIENT
Start: 2022-07-05 | End: 2022-08-12

## 2022-07-05 RX ORDER — ROSUVASTATIN CALCIUM 5 MG/1
5 TABLET, COATED ORAL DAILY
Qty: 90 TABLET | Refills: 0 | Status: SHIPPED | OUTPATIENT
Start: 2022-07-05 | End: 2022-08-05 | Stop reason: SDUPTHER

## 2022-07-05 RX ORDER — LOSARTAN POTASSIUM 25 MG/1
25 TABLET ORAL NIGHTLY
Qty: 90 TABLET | Refills: 0 | Status: SHIPPED | OUTPATIENT
Start: 2022-07-05 | End: 2022-08-05

## 2022-07-05 NOTE — PROGRESS NOTES
Subjective:       Patient ID: Mary Patterson is a 62 y.o. female.    Chief Complaint: Establish Care and Diabetes    Pt here to [facility]    Diabetes  She presents for her initial diabetic visit. She has type 2 diabetes mellitus. Her disease course has been fluctuating. Pertinent negatives for hypoglycemia include no confusion, dizziness, headaches or hunger. Pertinent negatives for diabetes include no blurred vision, no chest pain, no fatigue and no foot paresthesias. Symptoms are stable.     Review of Systems   Constitutional: Negative for fatigue.   Eyes: Negative for blurred vision.   Respiratory: Negative for shortness of breath.    Cardiovascular: Negative for chest pain.   Gastrointestinal: Negative for abdominal pain.   Neurological: Negative for dizziness and headaches.   Psychiatric/Behavioral: Negative for confusion.       Objective:      Physical Exam  Vitals and nursing note reviewed.   Constitutional:       General: She is not in acute distress.     Appearance: Normal appearance. She is well-developed. She is not diaphoretic.   HENT:      Head: Normocephalic and atraumatic.   Pulmonary:      Effort: Pulmonary effort is normal. No respiratory distress.      Breath sounds: Normal breath sounds. No wheezing.   Skin:     General: Skin is warm and dry.      Findings: No erythema or rash.   Neurological:      Mental Status: She is alert.         Assessment:       1. Uncontrolled type 2 diabetes mellitus with hyperglycemia, without long-term current use of insulin    2. Delayed immunizations        Plan:     Problem List Items Addressed This Visit        ID    Delayed immunizations    Relevant Orders    Tdap Vaccine       Endocrine    Uncontrolled type 2 diabetes mellitus with hyperglycemia, without long-term current use of insulin - Primary    Current Assessment & Plan     Chronic, worsening           Relevant Medications    empagliflozin (JARDIANCE) 25 mg tablet    losartan (COZAAR) 25 MG tablet     rosuvastatin (CRESTOR) 5 MG tablet    semaglutide (OZEMPIC) 0.25 mg or 0.5 mg(2 mg/1.5 mL) pen injector    Other Relevant Orders    Ambulatory referral/consult to Podiatry

## 2022-07-06 ENCOUNTER — HOSPITAL ENCOUNTER (OUTPATIENT)
Dept: RADIOLOGY | Facility: HOSPITAL | Age: 62
Discharge: HOME | End: 2022-07-06
Attending: FAMILY MEDICINE
Payer: COMMERCIAL

## 2022-07-06 DIAGNOSIS — Z12.31 ENCOUNTER FOR SCREENING MAMMOGRAM FOR BREAST CANCER: ICD-10-CM

## 2022-07-06 PROCEDURE — 77063 BREAST TOMOSYNTHESIS BI: CPT | Mod: 26,,, | Performed by: RADIOLOGY

## 2022-07-06 PROCEDURE — 77067 SCR MAMMO BI INCL CAD: CPT | Mod: 26,,, | Performed by: RADIOLOGY

## 2022-07-06 PROCEDURE — 77063 MAMMO DIGITAL SCREENING BILAT WITH TOMO: ICD-10-PCS | Mod: 26,,, | Performed by: RADIOLOGY

## 2022-07-06 PROCEDURE — 77063 BREAST TOMOSYNTHESIS BI: CPT | Mod: TC

## 2022-07-06 PROCEDURE — 77067 SCR MAMMO BI INCL CAD: CPT | Mod: TC

## 2022-07-06 PROCEDURE — 77067 MAMMO DIGITAL SCREENING BILAT WITH TOMO: ICD-10-PCS | Mod: 26,,, | Performed by: RADIOLOGY

## 2022-07-07 ENCOUNTER — OFFICE VISIT (OUTPATIENT)
Dept: OPHTHALMOLOGY | Facility: CLINIC | Age: 62
End: 2022-07-07
Payer: COMMERCIAL

## 2022-07-07 DIAGNOSIS — H25.13 NUCLEAR SCLEROSIS, BILATERAL: ICD-10-CM

## 2022-07-07 DIAGNOSIS — E11.9 TYPE 2 DIABETES MELLITUS WITHOUT RETINOPATHY: Primary | ICD-10-CM

## 2022-07-07 DIAGNOSIS — H52.13 MYOPIA WITH ASTIGMATISM AND PRESBYOPIA, BILATERAL: ICD-10-CM

## 2022-07-07 DIAGNOSIS — E11.36 DIABETIC CATARACT OF BOTH EYES: ICD-10-CM

## 2022-07-07 DIAGNOSIS — H52.4 MYOPIA WITH ASTIGMATISM AND PRESBYOPIA, BILATERAL: ICD-10-CM

## 2022-07-07 DIAGNOSIS — H43.399 VITREOUS FLOATERS, UNSPECIFIED LATERALITY: ICD-10-CM

## 2022-07-07 DIAGNOSIS — H52.203 MYOPIA WITH ASTIGMATISM AND PRESBYOPIA, BILATERAL: ICD-10-CM

## 2022-07-07 PROCEDURE — 3066F PR DOCUMENTATION OF TREATMENT FOR NEPHROPATHY: ICD-10-PCS | Mod: CPTII,S$GLB,, | Performed by: OPTOMETRIST

## 2022-07-07 PROCEDURE — 3046F PR MOST RECENT HEMOGLOBIN A1C LEVEL > 9.0%: ICD-10-PCS | Mod: CPTII,S$GLB,, | Performed by: OPTOMETRIST

## 2022-07-07 PROCEDURE — 3046F HEMOGLOBIN A1C LEVEL >9.0%: CPT | Mod: CPTII,S$GLB,, | Performed by: OPTOMETRIST

## 2022-07-07 PROCEDURE — 2023F DILAT RTA XM W/O RTNOPTHY: CPT | Mod: CPTII,S$GLB,, | Performed by: OPTOMETRIST

## 2022-07-07 PROCEDURE — 92014 PR EYE EXAM, EST PATIENT,COMPREHESV: ICD-10-PCS | Mod: S$GLB,,, | Performed by: OPTOMETRIST

## 2022-07-07 PROCEDURE — 1160F RVW MEDS BY RX/DR IN RCRD: CPT | Mod: CPTII,S$GLB,, | Performed by: OPTOMETRIST

## 2022-07-07 PROCEDURE — 3061F NEG MICROALBUMINURIA REV: CPT | Mod: CPTII,S$GLB,, | Performed by: OPTOMETRIST

## 2022-07-07 PROCEDURE — 92015 PR REFRACTION: ICD-10-PCS | Mod: S$GLB,,, | Performed by: OPTOMETRIST

## 2022-07-07 PROCEDURE — 4010F PR ACE/ARB THEARPY RXD/TAKEN: ICD-10-PCS | Mod: CPTII,S$GLB,, | Performed by: OPTOMETRIST

## 2022-07-07 PROCEDURE — 1159F PR MEDICATION LIST DOCUMENTED IN MEDICAL RECORD: ICD-10-PCS | Mod: CPTII,S$GLB,, | Performed by: OPTOMETRIST

## 2022-07-07 PROCEDURE — 99999 PR PBB SHADOW E&M-EST. PATIENT-LVL III: CPT | Mod: PBBFAC,,, | Performed by: OPTOMETRIST

## 2022-07-07 PROCEDURE — 92014 COMPRE OPH EXAM EST PT 1/>: CPT | Mod: S$GLB,,, | Performed by: OPTOMETRIST

## 2022-07-07 PROCEDURE — 3061F PR NEG MICROALBUMINURIA RESULT DOCUMENTED/REVIEW: ICD-10-PCS | Mod: CPTII,S$GLB,, | Performed by: OPTOMETRIST

## 2022-07-07 PROCEDURE — 1159F MED LIST DOCD IN RCRD: CPT | Mod: CPTII,S$GLB,, | Performed by: OPTOMETRIST

## 2022-07-07 PROCEDURE — 1160F PR REVIEW ALL MEDS BY PRESCRIBER/CLIN PHARMACIST DOCUMENTED: ICD-10-PCS | Mod: CPTII,S$GLB,, | Performed by: OPTOMETRIST

## 2022-07-07 PROCEDURE — 2023F PR DILATED RETINAL EXAM W/O EVID OF RETINOPATHY: ICD-10-PCS | Mod: CPTII,S$GLB,, | Performed by: OPTOMETRIST

## 2022-07-07 PROCEDURE — 4010F ACE/ARB THERAPY RXD/TAKEN: CPT | Mod: CPTII,S$GLB,, | Performed by: OPTOMETRIST

## 2022-07-07 PROCEDURE — 99999 PR PBB SHADOW E&M-EST. PATIENT-LVL III: ICD-10-PCS | Mod: PBBFAC,,, | Performed by: OPTOMETRIST

## 2022-07-07 PROCEDURE — 3066F NEPHROPATHY DOC TX: CPT | Mod: CPTII,S$GLB,, | Performed by: OPTOMETRIST

## 2022-07-07 PROCEDURE — 92015 DETERMINE REFRACTIVE STATE: CPT | Mod: S$GLB,,, | Performed by: OPTOMETRIST

## 2022-07-07 NOTE — PROGRESS NOTES
HPI     Diagnosed with diabetes in 10 + yrs  Lab Results       Component                Value               Date                       HGBA1C                   10.4 (H)            06/30/2022              Pt co more blurred va, pt states she may need new rx  No pain  No gtts              Last edited by Rahul Gardner on 7/7/2022  3:13 PM. (History)            Assessment /Plan     For exam results, see Encounter Report.    Type 2 diabetes mellitus without retinopathy  There was no diabetic retinopathy present in either eye today.   Recommended that pt continue care with PCP and/or specialists regarding diabetes.  Follow-up dilated eye exam recommended in 12 months, sooner with any vision changes or new concerns.    Nuclear sclerosis, bilateral  Diabetic cataract of both eyes  Cataracts not significantly affecting activities of daily living and therefore surgery is not indicated at this time.   Will continue to monitor over the next 12 months. Pt to call or RTC with any significant change in vision prior to next visit.     Vitreous floaters, unspecified laterality  Stable today, no tears, holes or RD   The nature of vitreous floaters was discussed with patient    Myopia with astigmatism and presbyopia, bilateral    Eyeglass Final Rx     Eyeglass Final Rx       Sphere Cylinder Axis    Right -2.00 +0.50 005    Left -2.75 +0.50 175    Expiration Date: 7/7/2023              Contact Lens Prescription (7/7/2022)        Brand Base Curve Diameter Sphere    Right No lens       Left Air Optix HydraGlyde 8.60 14.2 -2.50    Expiration Date: 7/7/2023    Replacement: Monthly    Wearing Schedule: Daily Wear          Dispensed trial contact lenses today. Patient is to wear lenses for 1 week.   Ok to order supply if no problems. RTC PRN if any problems arise.    Otherwise, RTC 1 yr for dilated eye exam.  Discussed above and answered questions.

## 2022-07-15 ENCOUNTER — TELEPHONE (OUTPATIENT)
Dept: INTERNAL MEDICINE | Facility: CLINIC | Age: 62
End: 2022-07-15
Payer: COMMERCIAL

## 2022-07-15 NOTE — TELEPHONE ENCOUNTER
Called and spoke to Jocelyn from [facility].  Went over directions, mg and quantity for Rx Ozempic.

## 2022-07-15 NOTE — TELEPHONE ENCOUNTER
----- Message from Carlyn Quinones sent at 7/15/2022  7:53 AM CDT -----  Contact: Yadira/Dickson Gamble  .Type:  Pharmacy Calling to Clarify an RX    Name of Caller: Yadira  Pharmacy Name:[facility]  Prescription Name:semaglutide (OZEMPIC) 0.25 mg or 0.5 mg(2 mg/1.5 mL) pen injector  What do they need to clarify?:Directions  Best Call Back Number: [phone]  Additional Information:   [facility] - [place] - [address]   [address] [address]  Phone: [phone] Fax: [phone]  RICO Wilson

## 2022-07-27 ENCOUNTER — OFFICE VISIT (OUTPATIENT)
Dept: PODIATRY | Facility: CLINIC | Age: 62
End: 2022-07-27
Payer: COMMERCIAL

## 2022-07-27 VITALS — WEIGHT: 216 LBS | HEIGHT: 65 IN | BODY MASS INDEX: 35.99 KG/M2

## 2022-07-27 DIAGNOSIS — B35.3 TINEA PEDIS, LEFT: ICD-10-CM

## 2022-07-27 DIAGNOSIS — B35.1 DERMATOPHYTOSIS OF NAIL: ICD-10-CM

## 2022-07-27 DIAGNOSIS — E11.65 UNCONTROLLED TYPE 2 DIABETES MELLITUS WITH HYPERGLYCEMIA, WITHOUT LONG-TERM CURRENT USE OF INSULIN: ICD-10-CM

## 2022-07-27 DIAGNOSIS — I87.2 VENOUS INSUFFICIENCY OF BOTH LOWER EXTREMITIES: ICD-10-CM

## 2022-07-27 DIAGNOSIS — E11.9 COMPREHENSIVE DIABETIC FOOT EXAMINATION, TYPE 2 DM, ENCOUNTER FOR: Primary | ICD-10-CM

## 2022-07-27 DIAGNOSIS — M21.41 PES PLANUS OF BOTH FEET: ICD-10-CM

## 2022-07-27 DIAGNOSIS — M21.42 PES PLANUS OF BOTH FEET: ICD-10-CM

## 2022-07-27 PROCEDURE — 99204 PR OFFICE/OUTPT VISIT, NEW, LEVL IV, 45-59 MIN: ICD-10-PCS | Mod: S$GLB,,, | Performed by: PODIATRIST

## 2022-07-27 PROCEDURE — 99999 PR PBB SHADOW E&M-EST. PATIENT-LVL III: ICD-10-PCS | Mod: PBBFAC,,, | Performed by: PODIATRIST

## 2022-07-27 PROCEDURE — 3046F HEMOGLOBIN A1C LEVEL >9.0%: CPT | Mod: CPTII,S$GLB,, | Performed by: PODIATRIST

## 2022-07-27 PROCEDURE — 3066F NEPHROPATHY DOC TX: CPT | Mod: CPTII,S$GLB,, | Performed by: PODIATRIST

## 2022-07-27 PROCEDURE — 3061F NEG MICROALBUMINURIA REV: CPT | Mod: CPTII,S$GLB,, | Performed by: PODIATRIST

## 2022-07-27 PROCEDURE — 4010F PR ACE/ARB THEARPY RXD/TAKEN: ICD-10-PCS | Mod: CPTII,S$GLB,, | Performed by: PODIATRIST

## 2022-07-27 PROCEDURE — 4010F ACE/ARB THERAPY RXD/TAKEN: CPT | Mod: CPTII,S$GLB,, | Performed by: PODIATRIST

## 2022-07-27 PROCEDURE — 3008F BODY MASS INDEX DOCD: CPT | Mod: CPTII,S$GLB,, | Performed by: PODIATRIST

## 2022-07-27 PROCEDURE — 3066F PR DOCUMENTATION OF TREATMENT FOR NEPHROPATHY: ICD-10-PCS | Mod: CPTII,S$GLB,, | Performed by: PODIATRIST

## 2022-07-27 PROCEDURE — 99999 PR PBB SHADOW E&M-EST. PATIENT-LVL III: CPT | Mod: PBBFAC,,, | Performed by: PODIATRIST

## 2022-07-27 PROCEDURE — 3008F PR BODY MASS INDEX (BMI) DOCUMENTED: ICD-10-PCS | Mod: CPTII,S$GLB,, | Performed by: PODIATRIST

## 2022-07-27 PROCEDURE — 3061F PR NEG MICROALBUMINURIA RESULT DOCUMENTED/REVIEW: ICD-10-PCS | Mod: CPTII,S$GLB,, | Performed by: PODIATRIST

## 2022-07-27 PROCEDURE — 1159F MED LIST DOCD IN RCRD: CPT | Mod: CPTII,S$GLB,, | Performed by: PODIATRIST

## 2022-07-27 PROCEDURE — 3046F PR MOST RECENT HEMOGLOBIN A1C LEVEL > 9.0%: ICD-10-PCS | Mod: CPTII,S$GLB,, | Performed by: PODIATRIST

## 2022-07-27 PROCEDURE — 99204 OFFICE O/P NEW MOD 45 MIN: CPT | Mod: S$GLB,,, | Performed by: PODIATRIST

## 2022-07-27 PROCEDURE — 1159F PR MEDICATION LIST DOCUMENTED IN MEDICAL RECORD: ICD-10-PCS | Mod: CPTII,S$GLB,, | Performed by: PODIATRIST

## 2022-07-27 RX ORDER — KETOCONAZOLE 20 MG/G
CREAM TOPICAL DAILY
Qty: 30 G | Refills: 4 | Status: SHIPPED | OUTPATIENT
Start: 2022-07-27 | End: 2022-08-05

## 2022-07-27 NOTE — PROGRESS NOTES
[facility]  PODIATRIC MEDICINE AND SURGERY      CHIEF COMPLAINT  Chief Complaint   Patient presents with    Diabetic Foot Exam     Diabetic routine exam, diabetic, wears casual shoes and socks, last seen PCP Dr. Vargas on 07/05/22         HPI    SUBJECTIVE: Mary Patterson is a 62 y.o. female who  has a past medical history of Diabetes mellitus, type 2, Obesity, Thyroid disease, and Type II or unspecified type diabetes mellitus without mention of complication, not stated as uncontrolled. Marypresents to clinic for high risk diabetic foot exam and care.  Mary denies numbness, burning, and/or tingling sensations in their feet. Patient relates blood sugars normally run around 200. Pt has established care with primary care physician and would like to establish care with a podiatric physician. Patient has no other pedal complaints at this time      HgA1c:   Hemoglobin A1C   Date Value Ref Range Status   06/30/2022 10.4 (H) 4.0 - 5.6 % Final     Comment:     ADA Screening Guidelines:  5.7-6.4%  Consistent with prediabetes  >or=6.5%  Consistent with diabetes    High levels of fetal hemoglobin interfere with the HbA1C  assay. Heterozygous hemoglobin variants (HbS, HgC, etc)do  not significantly interfere with this assay.   However, presence of multiple variants may affect accuracy.     10/11/2021 7.9 (H) 4.0 - 5.6 % Final     Comment:     ADA Screening Guidelines:  5.7-6.4%  Consistent with prediabetes  >or=6.5%  Consistent with diabetes    High levels of fetal hemoglobin interfere with the HbA1C  assay. Heterozygous hemoglobin variants (HbS, HgC, etc)do  not significantly interfere with this assay.   However, presence of multiple variants may affect accuracy.     06/29/2021 7.0 (H) 4.0 - 5.6 % Final     Comment:     ADA Screening Guidelines:  5.7-6.4%  Consistent with prediabetes  >or=6.5%  Consistent with diabetes    High levels of fetal hemoglobin interfere with the HbA1C  assay. Heterozygous  hemoglobin variants (HbS, HgC, etc)do  not significantly interfere with this assay.   However, presence of multiple variants may affect accuracy.           PMH  Past Medical History:   Diagnosis Date    Diabetes mellitus, type 2     Obesity     Thyroid disease     hypothyroidism    Type II or unspecified type diabetes mellitus without mention of complication, not stated as uncontrolled        MEDS  Current Outpatient Medications on File Prior to Visit   Medication Sig Dispense Refill    blood sugar diagnostic Strp To check BG 1-2 times daily, to use with insurance preferred meter 100 strip 3    blood-glucose meter kit To check Blood Glucose  1-2 times daily, to use with insurance preferred meter 1 each 0    lancets 28 gauge Misc To check Blood Glucose 1-2 times daily, to use with insurance preferred meter 100 each 3    levothyroxine (EUTHYROX) 175 MCG tablet TAKE 1 TABLET BY MOUTH ONCE DAILY ( MUST KEEP APPT FOR ADDITIONAL REFILLS) 90 tablet 0    losartan (COZAAR) 25 MG tablet Take 1 tablet (25 mg total) by mouth every evening. For kidney protection/BP 90 tablet 0    semaglutide (OZEMPIC) 0.25 mg or 0.5 mg(2 mg/1.5 mL) pen injector Inject 0.25 mg into the skin every 7 days for 30 days, THEN 0.5 mg every 7 days. 3 pen 0    empagliflozin (JARDIANCE) 25 mg tablet Take 1 tablet (25 mg total) by mouth once daily. (Patient not taking: Reported on 7/27/2022) 90 tablet 0    ondansetron (ZOFRAN-ODT) 4 MG TbDL Take 1-2 tablets (4-8 mg total) by mouth every 6 (six) hours as needed (nausea). (Patient not taking: Reported on 7/27/2022) 9 each 1    rosuvastatin (CRESTOR) 5 MG tablet Take 1 tablet (5 mg total) by mouth once daily. (Patient not taking: Reported on 7/27/2022) 90 tablet 0     No current facility-administered medications on file prior to visit.       PSH     Past Surgical History:   Procedure Laterality Date    BLADDER SUSPENSION      CHOLECYSTECTOMY      HYSTERECTOMY      KNEE SURGERY Right      "OOPHORECTOMY          ALL  Review of patient's allergies indicates:  No Known Allergies    SOC     Social History     Tobacco Use    Smoking status: Never Smoker    Smokeless tobacco: Never Used   Substance Use Topics    Alcohol use: No    Drug use: No         Family HX    Family History   Problem Relation Age of Onset    Obesity Father     Diabetes Father     Rheumatologic disease Sister     Obesity Sister     Obesity Paternal Grandfather     Diabetes Paternal Grandfather     Diabetes Paternal Uncle     Heart disease Neg Hx     Cancer Neg Hx             REVIEW OF SYSTEMS  General: Denies any fever or chills  Chest: Denies shortness of breath, wheezing, coughing, or sputum production  Heart: Denies chest pain.  As noted above and per history of current illness above, otherwise negative in the remainder of the 14 systems.     PHYSICAL EXAM  Vitals:    07/27/22 1058   Weight: 98 kg (216 lb)   Height: 5' 5" (1.651 m)   PainSc: 0-No pain       GEN:  This patient is well-developed, well-nourished and appears stated age, well-oriented to person, place and time, and cooperative and pleasant on today's visit.      LOWER EXTREMITY PHYSICAL EXAMINATION   VASCULAR  DP pedal pulse 2/4 RIGHT, LEFT2/4   PT pedal pulse 2/4 RIGHT, LEFT2/4  Capillary refill time immediate to the toes.   Feet are warm to the touch. Skin temperature warm to warm from proximally to distally   There are varicosities, telangiectasias noted to bilateral foot and ankle regions.   There are no ecchymoses noted to bilateral foot and ankle regions.   There is mild gross lower extremity edema.    DERMATOLOGIC  Skin moist with healthy texture and turgor.  There is scales noted 3,4 LEFT foot  There is mild discoloration left hallux   There are no open ulcerations, lacerations, or fissures to bilateral foot and ankle regions. There are no signs of infection as there is no erythema, no proximal-extending lymphangiitis, no fluctuance, or crepitus noted " on palpation to bilateral foot and ankle regions.   There is no interdigital maceration.   There are hyperkeratotic lesions noted to feet.     NEUROLOGIC  Protective sensation intact at 10/10 sites upon examination with McDowell Weinsten 5.07 g monofilament.  Propioception intact at 1st MTPJ b/l.  Achilles and patellar deep tendon reflexes intact  Babinski reflex absent    ORTHOPEDIC/BIOMECHANICAL  No symptomatic structural abnormalities noted. Muscle strength is 5/5 for foot inverters, everters, plantarflexors, and dorsiflexors. Muscle tone is normal.  Inspection/palpation of bone, joints and muscles unremarkable. Pes planus      ASSESSMENT  Encounter Diagnoses   Name Primary?    Uncontrolled type 2 diabetes mellitus with hyperglycemia, without long-term current use of insulin     Dermatophytosis of nail     Venous insufficiency of both lower extremities     Pes planus of both feet     Comprehensive diabetic foot examination, type 2 DM, encounter for Yes    Tinea pedis, left          Plan:  -Initial patient evaluation with H&P was performed    Comprehensive diabetic foot examination, type 2 DM, encounter for    Uncontrolled type 2 diabetes mellitus with hyperglycemia, without long-term current use of insulin  -     Ambulatory referral/consult to Podiatry    Dermatophytosis of nail    Venous insufficiency of both lower extremities    Pes planus of both feet    Tinea pedis, left    Other orders  -     ketoconazole (NIZORAL) 2 % cream; Apply topically once daily. For fungus  Dispense: 30 g; Refill: 4        -Discuss presenting problems, etiology, pathologic processes and management options with patient today.   -I counseled the patient on their conditions, their implications and medical management. An in depth discussion on diabetic management, risk prevention, amputation prevention verbally and provided educational literature in written format.  I counseled the patient on his/her Diabetic Mellitus regarding  today's clinical examination and objection findings. We did also discuss recent medication changes, pertinent labs and imaging evaluations and other medical consultation notes and progress notes. Greater than 50% of this visit was spent on counseling and coordination of care. Greater than 20 minutes of this appt. was spent on education about the diabetic foot, in relation to PVD and/or neuropathy, and the prevention of limb loss. Patient received these instructions in written format and discussed verbally.     Comprehensive in depth discussion provided in written format in regards to diabetic/at risk foot health and amputation prevention. Summary as noted below:  1. Anti-diabetic medications should be taken regularly to prevent complications.  2. Feet should be washed daily.  3. Lukewarm water should be used to wash feet.   4. The temperature of the water should be checked before washing feet.   5. Feet should be dried completely after washing and in between toes.  6. Talcum powder (Zeasorb ( should be used to keep the areas betweent he toes dry.   7. Lotion or moisturizing cream should be applied to the fet daily to prevent dryness of the skin. Examples provided to patient OTC.  8. Lotion should not be applied between the toes.  9. Socks should be changed daily. I recommend white socks in order to be able to note any drainage or bleeding if injury occurs.  10. Toenails should be trimmed straight across (if applicable).  11. Feet should be inspected at least once a day.  12. Diabetic patients should wear comfortable shoes. Examples provided.   13. The inside of the shoes should be inspected before wearing them.  14. Diabetic patients should not walk barefoot.   15. Diabetic patients should consult their podiatrist if their feet have redness, blisters, cuts, or wounds.       Shoe gear is inspected and wear and proper fit/type. If applicable/covered, pt was provided prescription for diabetic shoes and/or custom molded  inserts.  Shoe Fitting recommendations:  1. Have foot measured while standing.  2. Try on both shoes and walk around to be sure shoes are comfortable.  3. Allow at least a thumbs width of space at end of longest toe in shoes. Make sure you can wiggle toes inside shoe.  4. Try on shoes at end of the day due to foot swelling.   5. Look for shoes with a round and wide flexible  toe box, extra cushion, and thick/stiff heel. Check inside shoes and avoid thick seams.   6. Breathable anti-microbial or moisture wicking fabric is preferred. Leather uppers are optimal.     Patient is reminded of the importance of good nutrition and blood sugar control to help prevent podiatric complications of diabetes. I discussed proper blood glucose control and co-management with diabetes education team and patient's PCP and/or Endocrinology Advanced Practice Provider.  Patient  will continue to monitor the areas daily, inspect feet, wear protective shoe gear when ambulatory, moisturizer to maintain skin integrity.    Follow up as scheduled below or sooner if any problem arises with foot and/or ankle.       Disclaimer: This note was partially prepared using a voice recognition system and is likely to have sound alike errors within the text.        Future Appointments   Date Time Provider Department Center   7/28/2022  2:30 PM [facility]  [facility]   8/5/2022  4:40 PM Sravan Vargas MD [facility] [place]       Report Electronically Signed By:     Shelby Valera DPM   Podiatry  [facility]   7/27/2022

## 2022-07-27 NOTE — PATIENT INSTRUCTIONS
Thank you for choosing [facility] and Dr. Shelby Valera to take care of you.      I have provided further information for you about your diagnoses and/or aftercare for treatment. With a combination of care, patient education, and following the provided recommendations, we will do our best to work together to alleviate your pain. Please do not hesitate to reach out to me via MyChart, email, or phone ([phone]) if you have any questions, comments, or concerns.      If you felt that you received exemplary care today, please consider leaving us feedback on the survey that you will receive in the next few days. Your feedback is important to us.        Sincerely,  Dr. Shelby Valera     You have been prescribed compression stockings. You can take the prescription to any one of the below locations to have it filled.      [facility]  [address]  [place] [address]  Phone: [phone]    [facility]  [address] [place] [address] [phone]      You can also purchase Compression stockings: BELOW KNEE over the counter from your local [facility] for approximately $19.95 in either low, medium, or high grade compression.    Putting on Compression Stockings              Elastic compression stockings are prescribed to treat many vein problems. Wearing them may be the most important thing you do to manage your symptoms. The stockings fit tightly around your ankle, gradually reducing in pressure as they go up your legs. This helps keep blood flowing to your heart. As a result, swelling is reduced. Your doctor will prescribe stockings at a safe pressure for you. He or she will also tell you how often to wear and remove the stockings. Follow these instructions closely. Also, do not buy or wear compression stockings without first seeing your doctor.      Tips for Wear and Care  To wear stockings safely and to get the most benefit:  Wear the length prescribed by your  doctor.  Pull them to the designated height and no farther. Dont let them bunch at the top. This can restrict blood flow and increase swelling.  Wear the stockings for the amount of time your doctor recommends. Replace them when they start to feel loose. This will likely be every 3 to 6 months.  Remove them as your doctor directs. When removed, wash your legs. Then check your legs and feet for sores. Call your doctor if you find a sore. Dont put the stockings back on unless your doctor directs.  Wash the stockings as instructed. They may need to be handwashed.  © 4970-9636 [employer]. [address] [place] [address], [place] [address]. All rights reserved. This information is not intended as a substitute for professional medical care. Always follow your healthcare professional's instructions.

## 2022-07-28 ENCOUNTER — APPOINTMENT (OUTPATIENT)
Dept: RADIOLOGY | Facility: HOSPITAL | Age: 62
End: 2022-07-28
Attending: FAMILY MEDICINE
Payer: COMMERCIAL

## 2022-07-28 DIAGNOSIS — Z78.0 POSTMENOPAUSAL: ICD-10-CM

## 2022-07-28 PROCEDURE — 77080 DEXA BONE DENSITY SPINE HIP: ICD-10-PCS | Mod: 26,,, | Performed by: RADIOLOGY

## 2022-07-28 PROCEDURE — 77080 DXA BONE DENSITY AXIAL: CPT | Mod: TC

## 2022-07-28 PROCEDURE — 77080 DXA BONE DENSITY AXIAL: CPT | Mod: 26,,, | Performed by: RADIOLOGY

## 2022-07-30 ENCOUNTER — PATIENT MESSAGE (OUTPATIENT)
Dept: OPTOMETRY | Facility: CLINIC | Age: 62
End: 2022-07-30
Payer: COMMERCIAL

## 2022-08-05 ENCOUNTER — OFFICE VISIT (OUTPATIENT)
Dept: INTERNAL MEDICINE | Facility: CLINIC | Age: 62
End: 2022-08-05
Payer: COMMERCIAL

## 2022-08-05 VITALS
BODY MASS INDEX: 35.59 KG/M2 | HEART RATE: 72 BPM | WEIGHT: 213.63 LBS | TEMPERATURE: 98 F | HEIGHT: 65 IN | SYSTOLIC BLOOD PRESSURE: 136 MMHG | DIASTOLIC BLOOD PRESSURE: 76 MMHG

## 2022-08-05 DIAGNOSIS — E11.65 UNCONTROLLED TYPE 2 DIABETES MELLITUS WITH HYPERGLYCEMIA, WITHOUT LONG-TERM CURRENT USE OF INSULIN: ICD-10-CM

## 2022-08-05 DIAGNOSIS — Z00.00 WELLNESS EXAMINATION: Primary | ICD-10-CM

## 2022-08-05 DIAGNOSIS — E03.9 HYPOTHYROIDISM, UNSPECIFIED TYPE: ICD-10-CM

## 2022-08-05 PROBLEM — R59.0 SUBMANDIBULAR LYMPHADENOPATHY: Status: RESOLVED | Noted: 2019-03-22 | Resolved: 2022-08-05

## 2022-08-05 PROBLEM — G57.10 MERALGIA PARESTHETICA: Status: RESOLVED | Noted: 2021-04-08 | Resolved: 2022-08-05

## 2022-08-05 PROBLEM — Z28.9 DELAYED IMMUNIZATIONS: Status: RESOLVED | Noted: 2022-07-05 | Resolved: 2022-08-05

## 2022-08-05 PROBLEM — M72.2 PLANTAR FASCIITIS: Status: RESOLVED | Noted: 2017-09-13 | Resolved: 2022-08-05

## 2022-08-05 PROBLEM — R07.0 THROAT BURNING: Status: RESOLVED | Noted: 2019-03-22 | Resolved: 2022-08-05

## 2022-08-05 PROCEDURE — 90471 IMMUNIZATION ADMIN: CPT | Mod: S$GLB,,, | Performed by: FAMILY MEDICINE

## 2022-08-05 PROCEDURE — 3066F PR DOCUMENTATION OF TREATMENT FOR NEPHROPATHY: ICD-10-PCS | Mod: CPTII,S$GLB,, | Performed by: FAMILY MEDICINE

## 2022-08-05 PROCEDURE — 3075F PR MOST RECENT SYSTOLIC BLOOD PRESS GE 130-139MM HG: ICD-10-PCS | Mod: CPTII,S$GLB,, | Performed by: FAMILY MEDICINE

## 2022-08-05 PROCEDURE — 99999 PR PBB SHADOW E&M-EST. PATIENT-LVL III: CPT | Mod: PBBFAC,,, | Performed by: FAMILY MEDICINE

## 2022-08-05 PROCEDURE — 3008F BODY MASS INDEX DOCD: CPT | Mod: CPTII,S$GLB,, | Performed by: FAMILY MEDICINE

## 2022-08-05 PROCEDURE — 90471 PNEUMOCOCCAL CONJUGATE VACCINE 20-VALENT: ICD-10-PCS | Mod: S$GLB,,, | Performed by: FAMILY MEDICINE

## 2022-08-05 PROCEDURE — 99999 PR PBB SHADOW E&M-EST. PATIENT-LVL III: ICD-10-PCS | Mod: PBBFAC,,, | Performed by: FAMILY MEDICINE

## 2022-08-05 PROCEDURE — 1159F MED LIST DOCD IN RCRD: CPT | Mod: CPTII,S$GLB,, | Performed by: FAMILY MEDICINE

## 2022-08-05 PROCEDURE — 4010F ACE/ARB THERAPY RXD/TAKEN: CPT | Mod: CPTII,S$GLB,, | Performed by: FAMILY MEDICINE

## 2022-08-05 PROCEDURE — 90677 PNEUMOCOCCAL CONJUGATE VACCINE 20-VALENT: ICD-10-PCS | Mod: S$GLB,,, | Performed by: FAMILY MEDICINE

## 2022-08-05 PROCEDURE — 3078F DIAST BP <80 MM HG: CPT | Mod: CPTII,S$GLB,, | Performed by: FAMILY MEDICINE

## 2022-08-05 PROCEDURE — 4010F PR ACE/ARB THEARPY RXD/TAKEN: ICD-10-PCS | Mod: CPTII,S$GLB,, | Performed by: FAMILY MEDICINE

## 2022-08-05 PROCEDURE — 3061F NEG MICROALBUMINURIA REV: CPT | Mod: CPTII,S$GLB,, | Performed by: FAMILY MEDICINE

## 2022-08-05 PROCEDURE — 90677 PCV20 VACCINE IM: CPT | Mod: S$GLB,,, | Performed by: FAMILY MEDICINE

## 2022-08-05 PROCEDURE — 3008F PR BODY MASS INDEX (BMI) DOCUMENTED: ICD-10-PCS | Mod: CPTII,S$GLB,, | Performed by: FAMILY MEDICINE

## 2022-08-05 PROCEDURE — 3066F NEPHROPATHY DOC TX: CPT | Mod: CPTII,S$GLB,, | Performed by: FAMILY MEDICINE

## 2022-08-05 PROCEDURE — 1159F PR MEDICATION LIST DOCUMENTED IN MEDICAL RECORD: ICD-10-PCS | Mod: CPTII,S$GLB,, | Performed by: FAMILY MEDICINE

## 2022-08-05 PROCEDURE — 3046F HEMOGLOBIN A1C LEVEL >9.0%: CPT | Mod: CPTII,S$GLB,, | Performed by: FAMILY MEDICINE

## 2022-08-05 PROCEDURE — 3078F PR MOST RECENT DIASTOLIC BLOOD PRESSURE < 80 MM HG: ICD-10-PCS | Mod: CPTII,S$GLB,, | Performed by: FAMILY MEDICINE

## 2022-08-05 PROCEDURE — 99396 PREV VISIT EST AGE 40-64: CPT | Mod: 25,S$GLB,, | Performed by: FAMILY MEDICINE

## 2022-08-05 PROCEDURE — 3075F SYST BP GE 130 - 139MM HG: CPT | Mod: CPTII,S$GLB,, | Performed by: FAMILY MEDICINE

## 2022-08-05 PROCEDURE — 3046F PR MOST RECENT HEMOGLOBIN A1C LEVEL > 9.0%: ICD-10-PCS | Mod: CPTII,S$GLB,, | Performed by: FAMILY MEDICINE

## 2022-08-05 PROCEDURE — 99396 PR PREVENTIVE VISIT,EST,40-64: ICD-10-PCS | Mod: 25,S$GLB,, | Performed by: FAMILY MEDICINE

## 2022-08-05 PROCEDURE — 3061F PR NEG MICROALBUMINURIA RESULT DOCUMENTED/REVIEW: ICD-10-PCS | Mod: CPTII,S$GLB,, | Performed by: FAMILY MEDICINE

## 2022-08-05 RX ORDER — LEVOTHYROXINE SODIUM 175 UG/1
TABLET ORAL
Qty: 90 TABLET | Refills: 0 | Status: SHIPPED | OUTPATIENT
Start: 2022-08-05 | End: 2022-10-17 | Stop reason: SDUPTHER

## 2022-08-05 RX ORDER — LOSARTAN POTASSIUM 50 MG/1
50 TABLET ORAL DAILY
Qty: 90 TABLET | Refills: 0 | Status: SHIPPED | OUTPATIENT
Start: 2022-08-05 | End: 2023-04-24

## 2022-08-05 RX ORDER — ROSUVASTATIN CALCIUM 5 MG/1
5 TABLET, COATED ORAL DAILY
Qty: 90 TABLET | Refills: 0 | Status: SHIPPED | OUTPATIENT
Start: 2022-08-05 | End: 2023-04-24 | Stop reason: SDUPTHER

## 2022-08-05 NOTE — PROGRESS NOTES
Subjective:       Patient ID: Mary Patterson is a 62 y.o. female.    Chief Complaint: Annual Exam    Mary Patterson is a 62 y.o. female and is here for a comprehensive physical exam.    Do you take any herbs or supplements that were not prescribed by a doctor? no  Are you taking calcium supplements? no  Are you taking aspirin daily? no     History:  Any STD's in the past? none    The following portions of the patient's history were reviewed and updated as appropriate: allergies, current medications, past family history, past medical history, past social history, past surgical history and problem list.    Review of Systems  Do you have pain that bothers you in your daily life? no  Pertinent items are noted in HPI.      2. Patient Counseling:  --Nutrition: Stressed importance of moderation in sodium/caffeine intake, saturated fat and cholesterol, caloric balance.  --Exercise: Stressed the importance of regular exercise.   --Substance Abuse: Discussed cessation/primary prevention of tobacco, alcohol - nonuser   --Sexuality: Discussed sexually transmitted disease.  --Injury prevention: Discussed safety belts, smoke detector.   --Dental health: Discussed dental health.  --Immunizations reviewed.    3. Discussed the patient's BMI.  4. Follow up as needed for acute illness      Review of Systems   Constitutional: Negative for fever.   HENT: Negative for congestion.    Eyes: Negative for discharge.   Respiratory: Negative for shortness of breath.    Cardiovascular: Negative for chest pain.   Gastrointestinal: Negative for abdominal pain.   Genitourinary: Negative for difficulty urinating.   Musculoskeletal: Negative for joint swelling.   Neurological: Negative for dizziness.   Psychiatric/Behavioral: Negative for agitation.       Objective:      Physical Exam  Vitals and nursing note reviewed.   Constitutional:       General: She is not in acute distress.     Appearance: Normal appearance. She is well-developed. She  is not diaphoretic.   HENT:      Head: Normocephalic and atraumatic.   Eyes:      General: No scleral icterus.     Conjunctiva/sclera: Conjunctivae normal.   Cardiovascular:      Rate and Rhythm: Normal rate and regular rhythm.   Pulmonary:      Effort: Pulmonary effort is normal. No respiratory distress.      Breath sounds: Normal breath sounds. No wheezing.   Abdominal:      General: There is no distension.      Palpations: Abdomen is soft.      Tenderness: There is no abdominal tenderness. There is no guarding.   Skin:     General: Skin is warm.      Coloration: Skin is not pale.      Findings: No erythema or rash.      Comments: Good turgor   Neurological:      Mental Status: She is alert.   Psychiatric:         Mood and Affect: Mood normal.         Thought Content: Thought content normal.         Assessment:       1. Wellness examination    2. Uncontrolled type 2 diabetes mellitus with hyperglycemia, without long-term current use of insulin    3. Hypothyroidism, unspecified type        Plan:     Problem List Items Addressed This Visit        Endocrine    Uncontrolled type 2 diabetes mellitus with hyperglycemia, without long-term current use of insulin    Relevant Medications    rosuvastatin (CRESTOR) 5 MG tablet    losartan (COZAAR) 50 MG tablet    Hypothyroidism    Relevant Medications    levothyroxine (EUTHYROX) 175 MCG tablet       Other    Wellness examination - Primary    Relevant Orders    CBC Auto Differential    Hemoglobin A1C    Ferritin    Iron and TIBC    Hepatitis C Antibody    HIV 1/2 Ag/Ab (4th Gen)    Lipid Panel    TSH    Pneumococcal Conjugate Vaccine (20 Valent) (IM)    T4, FREE

## 2022-08-10 ENCOUNTER — TELEPHONE (OUTPATIENT)
Dept: INTERNAL MEDICINE | Facility: CLINIC | Age: 62
End: 2022-08-10
Payer: COMMERCIAL

## 2022-08-10 ENCOUNTER — PATIENT MESSAGE (OUTPATIENT)
Dept: INTERNAL MEDICINE | Facility: CLINIC | Age: 62
End: 2022-08-10
Payer: COMMERCIAL

## 2022-08-10 NOTE — TELEPHONE ENCOUNTER
----- Message from Tawana Valentine sent at 8/10/2022 11:45 AM CDT -----  Contact: patient  Mary Patterson would like a call back at [phone] or message on [facility], In regards to her Ozempic prescription. Pt states that the pharmacy says that it's on back order, and she's has not been able to receive it. Pt would also like to know if an appointment is needed to follow up with her lab work .

## 2022-08-10 NOTE — TELEPHONE ENCOUNTER
Called pt and phone went to . Pt mentioned we could message her through portal or call back. Left message.

## 2022-08-12 ENCOUNTER — TELEPHONE (OUTPATIENT)
Dept: INTERNAL MEDICINE | Facility: CLINIC | Age: 62
End: 2022-08-12
Payer: COMMERCIAL

## 2022-08-12 NOTE — TELEPHONE ENCOUNTER
----- Message from Renuka Vieyra sent at 8/12/2022  2:49 PM CDT -----  Contact: pt  The pt request a return call concerning a med refill, no additional info given and can be reached at [phone]///[id number]

## 2022-08-12 NOTE — TELEPHONE ENCOUNTER
Pt states Ozempic that was called in was at the wrong dosage as previously prescribed by Dr Farmer.    Med pended

## 2022-08-13 ENCOUNTER — LAB VISIT (OUTPATIENT)
Dept: LAB | Facility: HOSPITAL | Age: 62
End: 2022-08-13
Attending: FAMILY MEDICINE
Payer: COMMERCIAL

## 2022-08-13 DIAGNOSIS — Z00.00 WELLNESS EXAMINATION: ICD-10-CM

## 2022-08-13 LAB
BASOPHILS # BLD AUTO: 0.05 K/UL (ref 0–0.2)
BASOPHILS NFR BLD: 0.7 % (ref 0–1.9)
CHOLEST SERPL-MCNC: 167 MG/DL (ref 120–199)
CHOLEST/HDLC SERPL: 5.2 {RATIO} (ref 2–5)
DIFFERENTIAL METHOD: ABNORMAL
EOSINOPHIL # BLD AUTO: 0.3 K/UL (ref 0–0.5)
EOSINOPHIL NFR BLD: 4.7 % (ref 0–8)
ERYTHROCYTE [DISTWIDTH] IN BLOOD BY AUTOMATED COUNT: 12.7 % (ref 11.5–14.5)
ESTIMATED AVG GLUCOSE: 189 MG/DL (ref 68–131)
FERRITIN SERPL-MCNC: 141 NG/ML (ref 20–300)
HBA1C MFR BLD: 8.2 % (ref 4–5.6)
HCT VFR BLD AUTO: 40.5 % (ref 37–48.5)
HDLC SERPL-MCNC: 32 MG/DL (ref 40–75)
HDLC SERPL: 19.2 % (ref 20–50)
HGB BLD-MCNC: 13.7 G/DL (ref 12–16)
IMM GRANULOCYTES # BLD AUTO: 0.03 K/UL (ref 0–0.04)
IMM GRANULOCYTES NFR BLD AUTO: 0.4 % (ref 0–0.5)
IRON SERPL-MCNC: 71 UG/DL (ref 30–160)
LDLC SERPL CALC-MCNC: 103.2 MG/DL (ref 63–159)
LYMPHOCYTES # BLD AUTO: 1.7 K/UL (ref 1–4.8)
LYMPHOCYTES NFR BLD: 25.5 % (ref 18–48)
MCH RBC QN AUTO: 29.5 PG (ref 27–31)
MCHC RBC AUTO-ENTMCNC: 33.8 G/DL (ref 32–36)
MCV RBC AUTO: 87 FL (ref 82–98)
MONOCYTES # BLD AUTO: 0.3 K/UL (ref 0.3–1)
MONOCYTES NFR BLD: 4.5 % (ref 4–15)
NEUTROPHILS # BLD AUTO: 4.4 K/UL (ref 1.8–7.7)
NEUTROPHILS NFR BLD: 64.2 % (ref 38–73)
NONHDLC SERPL-MCNC: 135 MG/DL
NRBC BLD-RTO: 0 /100 WBC
PLATELET # BLD AUTO: 215 K/UL (ref 150–450)
PMV BLD AUTO: 13 FL (ref 9.2–12.9)
RBC # BLD AUTO: 4.65 M/UL (ref 4–5.4)
SATURATED IRON: 20 % (ref 20–50)
T4 FREE SERPL-MCNC: 1.53 NG/DL (ref 0.71–1.51)
TOTAL IRON BINDING CAPACITY: 348 UG/DL (ref 250–450)
TRANSFERRIN SERPL-MCNC: 235 MG/DL (ref 200–375)
TRIGL SERPL-MCNC: 159 MG/DL (ref 30–150)
TSH SERPL DL<=0.005 MIU/L-ACNC: 0.11 UIU/ML (ref 0.4–4)
WBC # BLD AUTO: 6.82 K/UL (ref 3.9–12.7)

## 2022-08-13 PROCEDURE — 87389 HIV-1 AG W/HIV-1&-2 AB AG IA: CPT | Performed by: FAMILY MEDICINE

## 2022-08-13 PROCEDURE — 84443 ASSAY THYROID STIM HORMONE: CPT | Performed by: FAMILY MEDICINE

## 2022-08-13 PROCEDURE — 84439 ASSAY OF FREE THYROXINE: CPT | Performed by: FAMILY MEDICINE

## 2022-08-13 PROCEDURE — 83036 HEMOGLOBIN GLYCOSYLATED A1C: CPT | Performed by: FAMILY MEDICINE

## 2022-08-13 PROCEDURE — 80061 LIPID PANEL: CPT | Performed by: FAMILY MEDICINE

## 2022-08-13 PROCEDURE — 85025 COMPLETE CBC W/AUTO DIFF WBC: CPT | Performed by: FAMILY MEDICINE

## 2022-08-13 PROCEDURE — 82728 ASSAY OF FERRITIN: CPT | Performed by: FAMILY MEDICINE

## 2022-08-13 PROCEDURE — 86803 HEPATITIS C AB TEST: CPT | Performed by: FAMILY MEDICINE

## 2022-08-13 PROCEDURE — 36415 COLL VENOUS BLD VENIPUNCTURE: CPT | Performed by: FAMILY MEDICINE

## 2022-08-13 PROCEDURE — 84466 ASSAY OF TRANSFERRIN: CPT | Performed by: FAMILY MEDICINE

## 2022-08-16 LAB
HCV AB SERPL QL IA: NEGATIVE
HIV 1+2 AB+HIV1 P24 AG SERPL QL IA: NEGATIVE

## 2022-09-06 ENCOUNTER — OFFICE VISIT (OUTPATIENT)
Dept: URGENT CARE | Facility: CLINIC | Age: 62
End: 2022-09-06
Payer: COMMERCIAL

## 2022-09-06 VITALS
SYSTOLIC BLOOD PRESSURE: 176 MMHG | WEIGHT: 205 LBS | DIASTOLIC BLOOD PRESSURE: 83 MMHG | TEMPERATURE: 98 F | BODY MASS INDEX: 32.95 KG/M2 | HEART RATE: 73 BPM | OXYGEN SATURATION: 100 % | HEIGHT: 66 IN | RESPIRATION RATE: 16 BRPM

## 2022-09-06 DIAGNOSIS — R30.0 DYSURIA: ICD-10-CM

## 2022-09-06 DIAGNOSIS — N30.01 ACUTE CYSTITIS WITH HEMATURIA: Primary | ICD-10-CM

## 2022-09-06 DIAGNOSIS — R03.0 ELEVATED BLOOD PRESSURE READING: ICD-10-CM

## 2022-09-06 LAB
BILIRUB UR QL STRIP: NEGATIVE
GLUCOSE UR QL STRIP: NEGATIVE
KETONES UR QL STRIP: NEGATIVE
LEUKOCYTE ESTERASE UR QL STRIP: POSITIVE
PH, POC UA: 5
POC BLOOD, URINE: POSITIVE
POC NITRATES, URINE: NEGATIVE
PROT UR QL STRIP: NEGATIVE
SP GR UR STRIP: 1.01 (ref 1–1.03)
UROBILINOGEN UR STRIP-ACNC: NORMAL (ref 0.1–1.1)

## 2022-09-06 PROCEDURE — 3008F PR BODY MASS INDEX (BMI) DOCUMENTED: ICD-10-PCS | Mod: CPTII,S$GLB,, | Performed by: NURSE PRACTITIONER

## 2022-09-06 PROCEDURE — 81003 POCT URINALYSIS, DIPSTICK, AUTOMATED, W/O SCOPE: ICD-10-PCS | Mod: QW,S$GLB,, | Performed by: NURSE PRACTITIONER

## 2022-09-06 PROCEDURE — 3061F NEG MICROALBUMINURIA REV: CPT | Mod: CPTII,S$GLB,, | Performed by: NURSE PRACTITIONER

## 2022-09-06 PROCEDURE — 3052F HG A1C>EQUAL 8.0%<EQUAL 9.0%: CPT | Mod: CPTII,S$GLB,, | Performed by: NURSE PRACTITIONER

## 2022-09-06 PROCEDURE — 99213 PR OFFICE/OUTPT VISIT, EST, LEVL III, 20-29 MIN: ICD-10-PCS | Mod: S$GLB,,, | Performed by: NURSE PRACTITIONER

## 2022-09-06 PROCEDURE — 3077F SYST BP >= 140 MM HG: CPT | Mod: CPTII,S$GLB,, | Performed by: NURSE PRACTITIONER

## 2022-09-06 PROCEDURE — 1159F MED LIST DOCD IN RCRD: CPT | Mod: CPTII,S$GLB,, | Performed by: NURSE PRACTITIONER

## 2022-09-06 PROCEDURE — 3079F DIAST BP 80-89 MM HG: CPT | Mod: CPTII,S$GLB,, | Performed by: NURSE PRACTITIONER

## 2022-09-06 PROCEDURE — 3061F PR NEG MICROALBUMINURIA RESULT DOCUMENTED/REVIEW: ICD-10-PCS | Mod: CPTII,S$GLB,, | Performed by: NURSE PRACTITIONER

## 2022-09-06 PROCEDURE — 4010F ACE/ARB THERAPY RXD/TAKEN: CPT | Mod: CPTII,S$GLB,, | Performed by: NURSE PRACTITIONER

## 2022-09-06 PROCEDURE — 3052F PR MOST RECENT HEMOGLOBIN A1C LEVEL 8.0 - < 9.0%: ICD-10-PCS | Mod: CPTII,S$GLB,, | Performed by: NURSE PRACTITIONER

## 2022-09-06 PROCEDURE — 3077F PR MOST RECENT SYSTOLIC BLOOD PRESSURE >= 140 MM HG: ICD-10-PCS | Mod: CPTII,S$GLB,, | Performed by: NURSE PRACTITIONER

## 2022-09-06 PROCEDURE — 3079F PR MOST RECENT DIASTOLIC BLOOD PRESSURE 80-89 MM HG: ICD-10-PCS | Mod: CPTII,S$GLB,, | Performed by: NURSE PRACTITIONER

## 2022-09-06 PROCEDURE — 3066F NEPHROPATHY DOC TX: CPT | Mod: CPTII,S$GLB,, | Performed by: NURSE PRACTITIONER

## 2022-09-06 PROCEDURE — 3066F PR DOCUMENTATION OF TREATMENT FOR NEPHROPATHY: ICD-10-PCS | Mod: CPTII,S$GLB,, | Performed by: NURSE PRACTITIONER

## 2022-09-06 PROCEDURE — 99213 OFFICE O/P EST LOW 20 MIN: CPT | Mod: S$GLB,,, | Performed by: NURSE PRACTITIONER

## 2022-09-06 PROCEDURE — 3008F BODY MASS INDEX DOCD: CPT | Mod: CPTII,S$GLB,, | Performed by: NURSE PRACTITIONER

## 2022-09-06 PROCEDURE — 81003 URINALYSIS AUTO W/O SCOPE: CPT | Mod: QW,S$GLB,, | Performed by: NURSE PRACTITIONER

## 2022-09-06 PROCEDURE — 4010F PR ACE/ARB THEARPY RXD/TAKEN: ICD-10-PCS | Mod: CPTII,S$GLB,, | Performed by: NURSE PRACTITIONER

## 2022-09-06 PROCEDURE — 87086 URINE CULTURE/COLONY COUNT: CPT | Performed by: EMERGENCY MEDICINE

## 2022-09-06 PROCEDURE — 1159F PR MEDICATION LIST DOCUMENTED IN MEDICAL RECORD: ICD-10-PCS | Mod: CPTII,S$GLB,, | Performed by: NURSE PRACTITIONER

## 2022-09-06 RX ORDER — NITROFURANTOIN 25; 75 MG/1; MG/1
100 CAPSULE ORAL 2 TIMES DAILY
Qty: 10 CAPSULE | Refills: 0 | Status: SHIPPED | OUTPATIENT
Start: 2022-09-06 | End: 2022-09-11

## 2022-09-06 NOTE — PROGRESS NOTES
"Subjective:       Patient ID: Mary Patterson is a 62 y.o. female.    Vitals:  height is 5' 5.5" (1.664 m) and weight is 93 kg (205 lb). Her tympanic temperature is 97.7 °F (36.5 °C). Her blood pressure is 176/83 (abnormal) and her pulse is 73. Her respiration is 16 and oxygen saturation is 100%.     Chief Complaint: Dysuria    62 yr old female presents to the Urgent Care with complaint of burning sensation with urination x 1.5 weeks. Patient has hx of chronic back pain and unsure if symptoms related to UTI at this time. Patient has hx of UTIs while taking Metformin. Patient just started taking Ozempic.    Dysuria   This is a new problem. The current episode started 1 to 4 weeks ago. The problem occurs every urination. The problem has been gradually worsening. The quality of the pain is described as burning and aching. The pain is at a severity of 8/10. The pain is severe. There has been no fever. She is Not sexually active. There is No history of pyelonephritis. Associated symptoms include chills, flank pain, frequency, sweats and urgency. Pertinent negatives include no behavior changes, discharge, hematuria, hesitancy, nausea, possible pregnancy, vomiting, weight loss, bubble bath use, constipation, rash or withholding. Treatments tried: cranberry juice, water. The treatment provided moderate relief. Her past medical history is significant for diabetes mellitus and recurrent UTIs. There is no history of catheterization, diabetes insipidus, genitourinary reflux, hypertension, kidney stones, a single kidney, STD, urinary stasis or a urological procedure.     Constitution: Positive for chills. Negative for sweating, fatigue and fever.   Cardiovascular:  Negative for chest pain and sob on exertion.   Gastrointestinal:  Negative for nausea, vomiting and constipation.   Genitourinary:  Positive for dysuria, frequency, urgency and flank pain. Negative for urine decreased, bladder incontinence and hematuria.   Skin:  " Negative for rash.   Neurological:  Negative for altered mental status.   Psychiatric/Behavioral:  Negative for altered mental status.      Objective:      Physical Exam   Constitutional: She is oriented to person, place, and time. She appears well-developed. She is cooperative. She is easily aroused.  Non-toxic appearance. She does not appear ill. No distress.   HENT:   Head: Normocephalic and atraumatic.   Nose: Nose abnormal.   Mouth/Throat: Mucous membranes are normal.   Eyes: Conjunctivae and lids are normal.   Cardiovascular: Normal rate, regular rhythm and normal heart sounds.   Pulmonary/Chest: Effort normal and breath sounds normal. No respiratory distress.   Abdominal: Normal appearance and bowel sounds are normal. She exhibits no distension, no abdominal bruit, no pulsatile midline mass and no mass. Soft. There is no abdominal tenderness. There is no rigidity, no rebound, no guarding, no tenderness at McBurney's point, negative Ayoub's sign, no left CVA tenderness and no right CVA tenderness.   Musculoskeletal: Normal range of motion.         General: Normal range of motion.   Neurological: She is alert, oriented to person, place, and time and easily aroused. She has normal strength.   Skin: Skin is warm, dry, intact, not diaphoretic and not pale.   Psychiatric: Her speech is normal and behavior is normal. Judgment and thought content normal.   Nursing note and vitals reviewed.      Assessment:       1. Acute cystitis with hematuria    2. Dysuria    3. Elevated blood pressure reading          Results for orders placed or performed in visit on 09/06/22   POCT Urinalysis, Dipstick, Automated, W/O Scope   Result Value Ref Range    POC Blood, Urine Positive (A) Negative    POC Bilirubin, Urine Negative Negative    POC Urobilinogen, Urine normal 0.1 - 1.1    POC Ketones, Urine Negative Negative    POC Protein, Urine Negative Negative    POC Nitrates, Urine Negative Negative    POC Glucose, Urine Negative  Negative    pH, UA 5.0     POC Specific Gravity, Urine 1.015 1.003 - 1.029    POC Leukocytes, Urine Positive (A) Negative       Plan:       Advised pt to keep log of BP reading and to f/u with PCP for further evaluation. Patient denies any lightheadedness, dizziness, CP at this time. Macrobid rx for UTI. ER precautions discussed. Patient verbalized understanding and agreed with tx plan.     Acute cystitis with hematuria  -     nitrofurantoin, macrocrystal-monohydrate, (MACROBID) 100 MG capsule; Take 1 capsule (100 mg total) by mouth 2 (two) times daily. for 5 days  Dispense: 10 capsule; Refill: 0    Dysuria  -     POCT Urinalysis, Dipstick, Automated, W/O Scope    Elevated blood pressure reading       Patient Instructions   If you were prescribed a narcotic or controlled medication, do not drive or operate heavy equipment or machinery while taking these medications.  You must understand that you've received an Urgent Care treatment only and that you may be released before all your medical problems are known or treated. You, the patient, will arrange for follow up care as instructed.  Follow up with your PCP or specialty clinic as directed within 2-5 days if not improved or as needed.  You can call [phone] to schedule an appointment with the appropriate provider.  If your condition worsens we recommend that you receive another evaluation at the emergency room immediately or contact your primary medical clinics after hours call service to discuss your concerns.  Please return here or go to the Emergency Department for any concerns or worsening of condition.

## 2022-09-06 NOTE — PATIENT INSTRUCTIONS

## 2022-09-09 ENCOUNTER — TELEPHONE (OUTPATIENT)
Dept: URGENT CARE | Facility: CLINIC | Age: 62
End: 2022-09-09
Payer: COMMERCIAL

## 2022-09-09 NOTE — TELEPHONE ENCOUNTER
Attempted to call patient to discuss her complaints related to her recent visit at urgent care on November 6th.  Left voicemail with my return phone number to discuss further if patient is interested.  I advised the patient that I will speak to the provider about the misstep.

## 2022-09-09 NOTE — TELEPHONE ENCOUNTER
Patient returned phone call.  We reviewed her visit and she had some light on some missteps that may have happened.  Patient is assured that I did reach out to the provider who took care of her to talk about the urine culture order not being put in.  Patient states that Tuesday night after taking her 1st dose of Macrobid she did have some fever, chills, nausea, vomiting.  But this time and since then she has had none of those symptoms.  She still has a little bit of left-sided back pain and notes some terminal dysuria.  States overall her bladder symptoms are much improved.  I encouraged patient that the fact that her symptoms are improved means that she is probably on a good antibiotic.  I would like to contact her Sunday and re-evaluate.  I advised her that if she has any symptoms whatsoever at that time, I will call in a different antibiotic for her and have her drop by the [facility] urgent care of her choice to submit a urine sample for culturing.  Patient verbalizes understanding of and agreement with this plan.

## 2022-09-11 ENCOUNTER — TELEPHONE (OUTPATIENT)
Dept: URGENT CARE | Facility: CLINIC | Age: 62
End: 2022-09-11
Payer: COMMERCIAL

## 2022-09-11 DIAGNOSIS — N39.0 URINARY TRACT INFECTION WITHOUT HEMATURIA, SITE UNSPECIFIED: Primary | ICD-10-CM

## 2022-09-11 RX ORDER — FLUCONAZOLE 150 MG/1
150 TABLET ORAL DAILY
Qty: 2 TABLET | Refills: 0 | Status: SHIPPED | OUTPATIENT
Start: 2022-09-11 | End: 2022-09-13

## 2022-09-11 RX ORDER — CIPROFLOXACIN 500 MG/1
500 TABLET ORAL EVERY 12 HOURS
Qty: 14 TABLET | Refills: 0 | Status: SHIPPED | OUTPATIENT
Start: 2022-09-11 | End: 2022-09-18

## 2022-09-11 NOTE — TELEPHONE ENCOUNTER
Contacted patient in follow-up.  She states that she still has a little bit of dysuria which she thinks may be due to yeast infection.  However, she still having a little bit of left flank pain.  No return of fever or nausea.  The patient states she is going to not take her Ozempic today, which she feels has contributed possibly to this urinary tract infection.  I have asked the patient to go today to the urgent care on [facility] to give a urine sample.  The clinic will be notified that she is coming.  That order will be added to the recent visit as an addendum.  A prescription for Cipro has been sent in.  Patient finished her Macrobid today.  We also sent in 2 tablets of Diflucan.  Patient is instructed to take 1 today for current yeast symptoms.  She will have an extra tablet if she develops new symptoms taking the Cipro.  Patient verbalizes understanding of and agreement this plan.

## 2022-09-13 LAB — BACTERIA UR CULT: NO GROWTH

## 2022-10-10 ENCOUNTER — TELEPHONE (OUTPATIENT)
Dept: INTERNAL MEDICINE | Facility: CLINIC | Age: 62
End: 2022-10-10
Payer: COMMERCIAL

## 2022-10-10 NOTE — TELEPHONE ENCOUNTER
----- Message from Fatou Russell sent at 10/10/2022 12:07 PM CDT -----  Contact: Mary  Patient is calling to speak to the nurse regarding medication. Report had a bad UTI with the medication. Patients also states needing blood work orders. Please give patient a call back at [phone]

## 2022-10-17 ENCOUNTER — OFFICE VISIT (OUTPATIENT)
Dept: INTERNAL MEDICINE | Facility: CLINIC | Age: 62
End: 2022-10-17
Payer: COMMERCIAL

## 2022-10-17 VITALS
DIASTOLIC BLOOD PRESSURE: 84 MMHG | WEIGHT: 213.38 LBS | HEIGHT: 66 IN | HEART RATE: 70 BPM | TEMPERATURE: 98 F | BODY MASS INDEX: 34.29 KG/M2 | SYSTOLIC BLOOD PRESSURE: 164 MMHG

## 2022-10-17 DIAGNOSIS — E03.9 HYPOTHYROIDISM, UNSPECIFIED TYPE: ICD-10-CM

## 2022-10-17 DIAGNOSIS — Z91.148 NON COMPLIANCE W MEDICATION REGIMEN: ICD-10-CM

## 2022-10-17 DIAGNOSIS — I15.2 HYPERTENSION ASSOCIATED WITH DIABETES: ICD-10-CM

## 2022-10-17 DIAGNOSIS — E11.59 HYPERTENSION ASSOCIATED WITH DIABETES: ICD-10-CM

## 2022-10-17 DIAGNOSIS — E11.65 UNCONTROLLED TYPE 2 DIABETES MELLITUS WITH HYPERGLYCEMIA, WITHOUT LONG-TERM CURRENT USE OF INSULIN: Primary | ICD-10-CM

## 2022-10-17 PROCEDURE — 3079F PR MOST RECENT DIASTOLIC BLOOD PRESSURE 80-89 MM HG: ICD-10-PCS | Mod: CPTII,S$GLB,, | Performed by: FAMILY MEDICINE

## 2022-10-17 PROCEDURE — 3066F PR DOCUMENTATION OF TREATMENT FOR NEPHROPATHY: ICD-10-PCS | Mod: CPTII,S$GLB,, | Performed by: FAMILY MEDICINE

## 2022-10-17 PROCEDURE — 3079F DIAST BP 80-89 MM HG: CPT | Mod: CPTII,S$GLB,, | Performed by: FAMILY MEDICINE

## 2022-10-17 PROCEDURE — 4010F PR ACE/ARB THEARPY RXD/TAKEN: ICD-10-PCS | Mod: CPTII,S$GLB,, | Performed by: FAMILY MEDICINE

## 2022-10-17 PROCEDURE — 99214 OFFICE O/P EST MOD 30 MIN: CPT | Mod: S$GLB,,, | Performed by: FAMILY MEDICINE

## 2022-10-17 PROCEDURE — 3052F PR MOST RECENT HEMOGLOBIN A1C LEVEL 8.0 - < 9.0%: ICD-10-PCS | Mod: CPTII,S$GLB,, | Performed by: FAMILY MEDICINE

## 2022-10-17 PROCEDURE — 99999 PR PBB SHADOW E&M-EST. PATIENT-LVL IV: ICD-10-PCS | Mod: PBBFAC,,, | Performed by: FAMILY MEDICINE

## 2022-10-17 PROCEDURE — 99214 PR OFFICE/OUTPT VISIT, EST, LEVL IV, 30-39 MIN: ICD-10-PCS | Mod: S$GLB,,, | Performed by: FAMILY MEDICINE

## 2022-10-17 PROCEDURE — 1159F PR MEDICATION LIST DOCUMENTED IN MEDICAL RECORD: ICD-10-PCS | Mod: CPTII,S$GLB,, | Performed by: FAMILY MEDICINE

## 2022-10-17 PROCEDURE — 3066F NEPHROPATHY DOC TX: CPT | Mod: CPTII,S$GLB,, | Performed by: FAMILY MEDICINE

## 2022-10-17 PROCEDURE — 99999 PR PBB SHADOW E&M-EST. PATIENT-LVL IV: CPT | Mod: PBBFAC,,, | Performed by: FAMILY MEDICINE

## 2022-10-17 PROCEDURE — 3077F PR MOST RECENT SYSTOLIC BLOOD PRESSURE >= 140 MM HG: ICD-10-PCS | Mod: CPTII,S$GLB,, | Performed by: FAMILY MEDICINE

## 2022-10-17 PROCEDURE — 1160F RVW MEDS BY RX/DR IN RCRD: CPT | Mod: CPTII,S$GLB,, | Performed by: FAMILY MEDICINE

## 2022-10-17 PROCEDURE — 1160F PR REVIEW ALL MEDS BY PRESCRIBER/CLIN PHARMACIST DOCUMENTED: ICD-10-PCS | Mod: CPTII,S$GLB,, | Performed by: FAMILY MEDICINE

## 2022-10-17 PROCEDURE — 1159F MED LIST DOCD IN RCRD: CPT | Mod: CPTII,S$GLB,, | Performed by: FAMILY MEDICINE

## 2022-10-17 PROCEDURE — 3052F HG A1C>EQUAL 8.0%<EQUAL 9.0%: CPT | Mod: CPTII,S$GLB,, | Performed by: FAMILY MEDICINE

## 2022-10-17 PROCEDURE — 3077F SYST BP >= 140 MM HG: CPT | Mod: CPTII,S$GLB,, | Performed by: FAMILY MEDICINE

## 2022-10-17 PROCEDURE — 3061F NEG MICROALBUMINURIA REV: CPT | Mod: CPTII,S$GLB,, | Performed by: FAMILY MEDICINE

## 2022-10-17 PROCEDURE — 4010F ACE/ARB THERAPY RXD/TAKEN: CPT | Mod: CPTII,S$GLB,, | Performed by: FAMILY MEDICINE

## 2022-10-17 PROCEDURE — 3061F PR NEG MICROALBUMINURIA RESULT DOCUMENTED/REVIEW: ICD-10-PCS | Mod: CPTII,S$GLB,, | Performed by: FAMILY MEDICINE

## 2022-10-17 RX ORDER — LEVOTHYROXINE SODIUM 175 UG/1
TABLET ORAL
Qty: 90 TABLET | Refills: 0 | Status: SHIPPED | OUTPATIENT
Start: 2022-10-17 | End: 2022-11-11 | Stop reason: SDUPTHER

## 2022-10-17 RX ORDER — SEMAGLUTIDE 1.34 MG/ML
0.25 INJECTION, SOLUTION SUBCUTANEOUS
Qty: 1 PEN | Refills: 0 | Status: SHIPPED | OUTPATIENT
Start: 2022-10-17 | End: 2022-11-16

## 2022-10-17 NOTE — PROGRESS NOTES
Subjective:       Patient ID: Mary Patterson is a 62 y.o. female.    Chief Complaint: No chief complaint on file.    Diabetes  She presents for her follow-up diabetic visit. She has type 2 diabetes mellitus. Her disease course has been worsening. Pertinent negatives for hypoglycemia include no confusion or dizziness. Pertinent negatives for diabetes include no blurred vision, no chest pain, no fatigue and no foot paresthesias.   Review of Systems   Constitutional:  Negative for fatigue.   Eyes:  Negative for blurred vision.   Respiratory:  Negative for shortness of breath.    Cardiovascular:  Negative for chest pain.   Gastrointestinal:  Negative for abdominal pain.   Neurological:  Negative for dizziness.   Psychiatric/Behavioral:  Negative for confusion.      Objective:      Physical Exam  Vitals and nursing note reviewed.   Constitutional:       General: She is not in acute distress.     Appearance: Normal appearance. She is well-developed. She is not diaphoretic.   HENT:      Head: Normocephalic and atraumatic.   Pulmonary:      Effort: Pulmonary effort is normal. No respiratory distress.      Breath sounds: Normal breath sounds. No wheezing.   Skin:     General: Skin is warm and dry.      Findings: No erythema or rash.   Neurological:      Mental Status: She is alert.       Assessment:       1. Uncontrolled type 2 diabetes mellitus with hyperglycemia, without long-term current use of insulin    2. Hypothyroidism, unspecified type    3. Hypertension associated with diabetes    4. Non compliance w medication regimen          Plan:     Problem List Items Addressed This Visit          Cardiac/Vascular    Hypertension associated with diabetes    Current Assessment & Plan     Worsening, patient not taking medications as prescribed         Relevant Medications    semaglutide (OZEMPIC) 0.25 mg or 0.5 mg(2 mg/1.5 mL) pen injector       Endocrine    Uncontrolled type 2 diabetes mellitus with hyperglycemia, without  long-term current use of insulin - Primary    Current Assessment & Plan     Chronic, worsening.         Relevant Medications    semaglutide (OZEMPIC) 0.25 mg or 0.5 mg(2 mg/1.5 mL) pen injector    Other Relevant Orders    Ambulatory referral/consult to Diabetes Education    Hypothyroidism    Overview     Chronic, Stable, cont synthroid         Relevant Medications    levothyroxine (EUTHYROX) 175 MCG tablet       Palliative Care    Non compliance w medication regimen    Overview     Patient refuses statin although she is diabetic, also refuses losartan

## 2022-10-18 ENCOUNTER — TELEPHONE (OUTPATIENT)
Dept: INTERNAL MEDICINE | Facility: CLINIC | Age: 62
End: 2022-10-18
Payer: COMMERCIAL

## 2022-10-18 NOTE — TELEPHONE ENCOUNTER
----- Message from Carolina Cruz sent at 10/18/2022 12:01 PM CDT -----  Contact: adelina  .Type:  Pharmacy Calling to Clarify an RX    Name of Caller: Adelina  Pharmacy Name: [facility]   Prescription Name: Ozempic  What do they need to clarify?: quantity   Best Call Back Number: [phone]   Additional Information:    The prescription was received 10.17.2022

## 2022-10-19 ENCOUNTER — TELEPHONE (OUTPATIENT)
Dept: INTERNAL MEDICINE | Facility: CLINIC | Age: 62
End: 2022-10-19
Payer: COMMERCIAL

## 2022-10-19 NOTE — TELEPHONE ENCOUNTER
----- Message from Carolina Cruz sent at 10/18/2022 12:01 PM CDT -----  Contact: adelina  .Type:  Pharmacy Calling to Clarify an RX    Name of Caller: Adelina  Pharmacy Name: [facility]   Prescription Name: Ozempic  What do they need to clarify?: quantity   Best Call Back Number: [phone]   Additional Information:    The prescription was received 10.17.2022

## 2022-10-19 NOTE — TELEPHONE ENCOUNTER
Called Davina from [facility] pharmacy and discussed rx ozempic.    Davina states that the 1 pen called in will last pt 2 mnths but start - end date indicates 1 mnth.  Davina wants to clarify  if pt will be gradually moving up dosages and if so to state in directions.  Davina states pt will not be able to get a refill on a higher dose if not.    Please advise

## 2022-10-25 PROBLEM — Z91.148 NON COMPLIANCE W MEDICATION REGIMEN: Status: ACTIVE | Noted: 2022-10-25

## 2022-11-07 PROBLEM — Z00.00 WELLNESS EXAMINATION: Status: RESOLVED | Noted: 2022-08-05 | Resolved: 2022-11-07

## 2022-11-11 DIAGNOSIS — E03.9 HYPOTHYROIDISM, UNSPECIFIED TYPE: ICD-10-CM

## 2022-11-11 NOTE — TELEPHONE ENCOUNTER
----- Message from Jany Cohen sent at 11/11/2022  8:13 AM CST -----  Contact: [phone]  Type:  RX Refill Request    Who Called: Mary   Refill or New Rx:refill   RX Name and Strength:levothyroxine (EUTHYROX) 175 MCG tablet  How is the patient currently taking it? (ex. 1XDay):1 a day   Is this a 30 day or 90 day RX:90  Preferred Pharmacy with phone number:  [id number] - [place] - [address]  [address]  [place] [address]  Phone: [phone] Fax: [phone]  Local or Mail Order:local   Ordering Provider:Dr Vargas   Would the patient rather a call back or a response via [facility]? Call back   Best Call Back Number:[phone]  Additional Information:pt states she only have 3 pills left

## 2022-11-14 RX ORDER — LEVOTHYROXINE SODIUM 175 UG/1
TABLET ORAL
Qty: 30 TABLET | Refills: 0 | Status: SHIPPED | OUTPATIENT
Start: 2022-11-14 | End: 2023-02-16 | Stop reason: SDUPTHER

## 2022-12-07 DIAGNOSIS — E11.9 TYPE 2 DIABETES MELLITUS WITHOUT COMPLICATION: ICD-10-CM

## 2022-12-19 RX ORDER — SEMAGLUTIDE 1.34 MG/ML
0.25 INJECTION, SOLUTION SUBCUTANEOUS
Qty: 1 PEN | Refills: 0 | Status: SHIPPED | OUTPATIENT
Start: 2022-12-19 | End: 2023-01-20

## 2022-12-19 NOTE — TELEPHONE ENCOUNTER
----- Message from Kristin Ramachandran sent at 12/19/2022 10:46 AM CST -----  Type:  RX Refill Request    Who Called: patient  Refill or New Rx:refill  RX Name and Strength:Ozempic Injection .25  How is the patient currently taking it? (ex. 1XDay):na  Is this a 30 day or 90 day RX:na  [facility]  Local or Mail Order:  Ordering Provider:Dr Vargas  Would the patient rather a call back or a response via [facility]? Call back  Best Call Back Number:[phone]  Additional Information: na

## 2023-01-06 ENCOUNTER — TELEPHONE (OUTPATIENT)
Dept: INTERNAL MEDICINE | Facility: CLINIC | Age: 63
End: 2023-01-06
Payer: COMMERCIAL

## 2023-01-06 NOTE — TELEPHONE ENCOUNTER
----- Message from Susana Hull sent at 1/6/2023 11:49 AM CST -----  Contact: Mary  Reports pharmacy doesn't have ozempic in stock and doesn't know what she needs to do and when medication will be in due to shortage. Reports haven't had it in 2 weeks and has concerns regarding A1C. Please give patient a call or leave message with what she needs to do if no answer. [phone]

## 2023-01-06 NOTE — TELEPHONE ENCOUNTER
Pt hasn't been able to get Ozempic 0.25 mg due to medicine shortage. Do you want to change dosage or prescribe another medicine? Pt is concerned about her A1C level increasing since she is not taking Ozempic.

## 2023-01-09 ENCOUNTER — PATIENT OUTREACH (OUTPATIENT)
Dept: ADMINISTRATIVE | Facility: HOSPITAL | Age: 63
End: 2023-01-09
Payer: COMMERCIAL

## 2023-01-09 NOTE — PROGRESS NOTES
DM lab report: Per chart review, patient has a lab appointment scheduled on 2/20/23 for recheck of Hemoglobin a1c.

## 2023-01-19 ENCOUNTER — PATIENT MESSAGE (OUTPATIENT)
Dept: INTERNAL MEDICINE | Facility: CLINIC | Age: 63
End: 2023-01-19
Payer: COMMERCIAL

## 2023-01-19 NOTE — TELEPHONE ENCOUNTER
[facility] has [facility]pic 1 mg and pt is asking for a new script to be sent in. Please adjust the dose.

## 2023-01-25 ENCOUNTER — TELEPHONE (OUTPATIENT)
Dept: INTERNAL MEDICINE | Facility: CLINIC | Age: 63
End: 2023-01-25
Payer: COMMERCIAL

## 2023-01-25 NOTE — TELEPHONE ENCOUNTER
----- Message from Lyudmila Steven sent at 1/23/2023 10:13 AM CST -----  Contact: [facility]   Pharmacy is calling to clarify an RX.  RX name:  semaglutide (OZEMPIC) 1 mg/dose (2 mg/1.5 mL) PnIj  What do they need to clarify:  directions  Comments:

## 2023-01-25 NOTE — TELEPHONE ENCOUNTER
Called [facility] and spoke to Diamond.  Diamond wanted to clarify the correct mg for Oxempic  semaglutide (OZEMPIC) 1 mg/dose (2 mg/1.5 mL) was sent to pharmacy on 1/20/23.    Advised that pt should be taking Ozempic 1 mg.

## 2023-02-14 ENCOUNTER — PATIENT OUTREACH (OUTPATIENT)
Dept: ADMINISTRATIVE | Facility: HOSPITAL | Age: 63
End: 2023-02-14
Payer: COMMERCIAL

## 2023-02-16 DIAGNOSIS — E03.9 HYPOTHYROIDISM, UNSPECIFIED TYPE: ICD-10-CM

## 2023-02-16 NOTE — TELEPHONE ENCOUNTER
Pt doesn't want to get Hem A1 C next week, because she has been without her Ozempic for 2 months and is just getting it filled. Pt is due to come in on 02/24/2023 for f/u on DM and Thyroid issues, I explained it to her that she and Dr Vargas can discuss this then. Pt is requesting a refill on Synthroid meds because she will run out before her appointment.

## 2023-02-17 RX ORDER — LEVOTHYROXINE SODIUM 175 UG/1
TABLET ORAL
Qty: 30 TABLET | Refills: 0 | Status: SHIPPED | OUTPATIENT
Start: 2023-02-17 | End: 2023-03-24 | Stop reason: SDUPTHER

## 2023-02-20 ENCOUNTER — PATIENT OUTREACH (OUTPATIENT)
Dept: ADMINISTRATIVE | Facility: HOSPITAL | Age: 63
End: 2023-02-20
Payer: COMMERCIAL

## 2023-02-20 ENCOUNTER — PATIENT MESSAGE (OUTPATIENT)
Dept: ADMINISTRATIVE | Facility: HOSPITAL | Age: 63
End: 2023-02-20
Payer: COMMERCIAL

## 2023-02-20 NOTE — Clinical Note
I spoke to patient today offering rescheduling appt for labs, she had  previously canceled lab appt, pt wants Dr Vargas to know she had to wait approximately 2 mo before ozempic was avail, she has been taking med about 3 wks now and not sure if she should wait to have Ha1c done to see effectiveness of med? Also is requesting Thyroid levels to be checked when Ha1c is scheduled.  Please contact pt.  Thank you

## 2023-02-20 NOTE — PROGRESS NOTES
DM Lab REPORT: Pt canceled appt scheduled 02/20/23, spoke to pt she states she was unsure when Ha1c needed repeat due to she waited 2 months before Ozempic was available at pharmacy, she would like Dr Vargas to be notified of that before her lab is scheduled and she would also like Thyroid levels checked.  Will send staff message.

## 2023-02-23 ENCOUNTER — TELEPHONE (OUTPATIENT)
Dept: INTERNAL MEDICINE | Facility: CLINIC | Age: 63
End: 2023-02-23
Payer: COMMERCIAL

## 2023-02-23 NOTE — TELEPHONE ENCOUNTER
----- Message from Tawana Valentine sent at 2/22/2023  2:43 PM CST -----  Contact: Sergio  Type:  Pharmacy Calling to Clarify an RX    Name of Caller:Sergio   Pharmacy Name: [facility]   Prescription Name:  (OZEMPIC)   What do they need to clarify?: dosage   Best Call Back Number: [phone]   Additional Information:

## 2023-02-23 NOTE — TELEPHONE ENCOUNTER
Called given number to speak to someone at [facility].  Was unable to speak to someone or leave a vm.

## 2023-03-24 ENCOUNTER — TELEPHONE (OUTPATIENT)
Dept: INTERNAL MEDICINE | Facility: CLINIC | Age: 63
End: 2023-03-24
Payer: COMMERCIAL

## 2023-03-24 DIAGNOSIS — E03.9 HYPOTHYROIDISM, UNSPECIFIED TYPE: ICD-10-CM

## 2023-03-24 RX ORDER — LEVOTHYROXINE SODIUM 175 UG/1
TABLET ORAL
Qty: 15 TABLET | Refills: 0 | Status: CANCELLED | OUTPATIENT
Start: 2023-03-24

## 2023-03-24 NOTE — TELEPHONE ENCOUNTER
Pt appointment is on 04/04/2023 . She will run out of meds before this . Will give her 15 tab to get her thru the appointment

## 2023-04-19 ENCOUNTER — PATIENT MESSAGE (OUTPATIENT)
Dept: ADMINISTRATIVE | Facility: HOSPITAL | Age: 63
End: 2023-04-19
Payer: COMMERCIAL

## 2023-04-24 ENCOUNTER — OFFICE VISIT (OUTPATIENT)
Dept: INTERNAL MEDICINE | Facility: CLINIC | Age: 63
End: 2023-04-24
Payer: COMMERCIAL

## 2023-04-24 VITALS
WEIGHT: 215.19 LBS | SYSTOLIC BLOOD PRESSURE: 150 MMHG | HEIGHT: 66 IN | HEART RATE: 78 BPM | DIASTOLIC BLOOD PRESSURE: 88 MMHG | BODY MASS INDEX: 34.58 KG/M2 | OXYGEN SATURATION: 99 % | TEMPERATURE: 97 F

## 2023-04-24 DIAGNOSIS — E11.65 UNCONTROLLED TYPE 2 DIABETES MELLITUS WITH HYPERGLYCEMIA, WITHOUT LONG-TERM CURRENT USE OF INSULIN: ICD-10-CM

## 2023-04-24 DIAGNOSIS — I15.2 HYPERTENSION ASSOCIATED WITH DIABETES: ICD-10-CM

## 2023-04-24 DIAGNOSIS — E03.9 HYPOTHYROIDISM, UNSPECIFIED TYPE: ICD-10-CM

## 2023-04-24 DIAGNOSIS — Z12.11 ENCOUNTER FOR SCREENING COLONOSCOPY: ICD-10-CM

## 2023-04-24 DIAGNOSIS — E11.59 HYPERTENSION ASSOCIATED WITH DIABETES: ICD-10-CM

## 2023-04-24 DIAGNOSIS — Z00.00 WELLNESS EXAMINATION: Primary | ICD-10-CM

## 2023-04-24 PROCEDURE — 90471 ZOSTER RECOMBINANT VACCINE: ICD-10-PCS | Mod: S$GLB,,, | Performed by: FAMILY MEDICINE

## 2023-04-24 PROCEDURE — 1159F PR MEDICATION LIST DOCUMENTED IN MEDICAL RECORD: ICD-10-PCS | Mod: CPTII,S$GLB,, | Performed by: FAMILY MEDICINE

## 2023-04-24 PROCEDURE — 3072F PR LOW RISK FOR RETINOPATHY: ICD-10-PCS | Mod: CPTII,S$GLB,, | Performed by: FAMILY MEDICINE

## 2023-04-24 PROCEDURE — 90471 IMMUNIZATION ADMIN: CPT | Mod: S$GLB,,, | Performed by: FAMILY MEDICINE

## 2023-04-24 PROCEDURE — 99396 PR PREVENTIVE VISIT,EST,40-64: ICD-10-PCS | Mod: 25,S$GLB,, | Performed by: FAMILY MEDICINE

## 2023-04-24 PROCEDURE — 99999 PR PBB SHADOW E&M-EST. PATIENT-LVL IV: ICD-10-PCS | Mod: PBBFAC,,, | Performed by: FAMILY MEDICINE

## 2023-04-24 PROCEDURE — 99999 PR PBB SHADOW E&M-EST. PATIENT-LVL IV: CPT | Mod: PBBFAC,,, | Performed by: FAMILY MEDICINE

## 2023-04-24 PROCEDURE — 3079F PR MOST RECENT DIASTOLIC BLOOD PRESSURE 80-89 MM HG: ICD-10-PCS | Mod: CPTII,S$GLB,, | Performed by: FAMILY MEDICINE

## 2023-04-24 PROCEDURE — 99396 PREV VISIT EST AGE 40-64: CPT | Mod: 25,S$GLB,, | Performed by: FAMILY MEDICINE

## 2023-04-24 PROCEDURE — 4010F PR ACE/ARB THEARPY RXD/TAKEN: ICD-10-PCS | Mod: CPTII,S$GLB,, | Performed by: FAMILY MEDICINE

## 2023-04-24 PROCEDURE — 3079F DIAST BP 80-89 MM HG: CPT | Mod: CPTII,S$GLB,, | Performed by: FAMILY MEDICINE

## 2023-04-24 PROCEDURE — 1159F MED LIST DOCD IN RCRD: CPT | Mod: CPTII,S$GLB,, | Performed by: FAMILY MEDICINE

## 2023-04-24 PROCEDURE — 3008F BODY MASS INDEX DOCD: CPT | Mod: CPTII,S$GLB,, | Performed by: FAMILY MEDICINE

## 2023-04-24 PROCEDURE — 3008F PR BODY MASS INDEX (BMI) DOCUMENTED: ICD-10-PCS | Mod: CPTII,S$GLB,, | Performed by: FAMILY MEDICINE

## 2023-04-24 PROCEDURE — 90750 ZOSTER RECOMBINANT VACCINE: ICD-10-PCS | Mod: S$GLB,,, | Performed by: FAMILY MEDICINE

## 2023-04-24 PROCEDURE — 3072F LOW RISK FOR RETINOPATHY: CPT | Mod: CPTII,S$GLB,, | Performed by: FAMILY MEDICINE

## 2023-04-24 PROCEDURE — 4010F ACE/ARB THERAPY RXD/TAKEN: CPT | Mod: CPTII,S$GLB,, | Performed by: FAMILY MEDICINE

## 2023-04-24 PROCEDURE — 3077F SYST BP >= 140 MM HG: CPT | Mod: CPTII,S$GLB,, | Performed by: FAMILY MEDICINE

## 2023-04-24 PROCEDURE — 3077F PR MOST RECENT SYSTOLIC BLOOD PRESSURE >= 140 MM HG: ICD-10-PCS | Mod: CPTII,S$GLB,, | Performed by: FAMILY MEDICINE

## 2023-04-24 PROCEDURE — 90750 HZV VACC RECOMBINANT IM: CPT | Mod: S$GLB,,, | Performed by: FAMILY MEDICINE

## 2023-04-24 RX ORDER — LOSARTAN POTASSIUM 50 MG/1
50 TABLET ORAL DAILY
Qty: 90 TABLET | Refills: 0 | Status: CANCELLED | OUTPATIENT
Start: 2023-04-24 | End: 2023-07-23

## 2023-04-24 RX ORDER — SEMAGLUTIDE 1.34 MG/ML
INJECTION, SOLUTION SUBCUTANEOUS
COMMUNITY
Start: 2023-02-22 | End: 2023-04-24

## 2023-04-24 RX ORDER — LOSARTAN POTASSIUM 100 MG/1
100 TABLET ORAL DAILY
Qty: 30 TABLET | Refills: 0 | Status: SHIPPED | OUTPATIENT
Start: 2023-04-24 | End: 2023-10-13 | Stop reason: SDUPTHER

## 2023-04-24 RX ORDER — SEMAGLUTIDE 1.34 MG/ML
INJECTION, SOLUTION SUBCUTANEOUS
Status: CANCELLED | OUTPATIENT
Start: 2023-04-24

## 2023-04-24 RX ORDER — SEMAGLUTIDE 1.34 MG/ML
0.5 INJECTION, SOLUTION SUBCUTANEOUS
Qty: 1.5 ML | Refills: 0 | Status: SHIPPED | OUTPATIENT
Start: 2023-04-24 | End: 2023-04-28

## 2023-04-24 RX ORDER — ROSUVASTATIN CALCIUM 5 MG/1
5 TABLET, COATED ORAL DAILY
Qty: 30 TABLET | Refills: 0 | Status: SHIPPED | OUTPATIENT
Start: 2023-04-24 | End: 2023-07-24 | Stop reason: SDUPTHER

## 2023-04-24 NOTE — PROGRESS NOTES
Subjective:       Patient ID: Mary Patterson is a 63 y.o. female.    Chief Complaint: Labs Only and Medication Refill    Mary Patterson is a 63 y.o. female and is here for a comprehensive physical exam.    Do you take any herbs or supplements that were not prescribed by a doctor? no  Are you taking calcium supplements? no  Are you taking aspirin daily? no     History:  Any STD's in the past? none    The following portions of the patient's history were reviewed and updated as appropriate: allergies, current medications, past family history, past medical history, past social history, past surgical history and problem list.    Review of Systems  Do you have pain that bothers you in your daily life? Tooth pain  Pertinent items are noted in HPI.      2. Patient Counseling:  --Nutrition: Stressed importance of moderation in sodium/caffeine intake, saturated fat and cholesterol, caloric balance.  --Exercise: Stressed the importance of regular exercise.   --Substance Abuse: Discussed cessation/primary prevention of tobacco, alcohol - nonuser   --Sexuality: Discussed sexually transmitted disease.  --Injury prevention: Discussed safety belts, smoke detector.   --Dental health: Discussed dental health.  --Immunizations reviewed.  --Discussed benefits of screening colonoscopy.    3. Discussed the patient's BMI.  4. Follow up as needed for acute illness        Review of Systems   Constitutional:  Negative for fever.   HENT:  Negative for congestion.    Eyes:  Negative for discharge.   Respiratory:  Negative for shortness of breath.    Cardiovascular:  Negative for chest pain.   Gastrointestinal:  Negative for abdominal pain.   Genitourinary:  Negative for difficulty urinating.   Musculoskeletal:  Negative for joint swelling.   Neurological:  Negative for dizziness.   Psychiatric/Behavioral:  Negative for agitation.      Objective:      Physical Exam  Vitals and nursing note reviewed.   Constitutional:       General: She is  not in acute distress.     Appearance: Normal appearance. She is well-developed. She is obese. She is not diaphoretic.   HENT:      Head: Normocephalic and atraumatic.   Eyes:      General: No scleral icterus.     Conjunctiva/sclera: Conjunctivae normal.   Cardiovascular:      Rate and Rhythm: Normal rate and regular rhythm.   Pulmonary:      Effort: Pulmonary effort is normal. No respiratory distress.      Breath sounds: Normal breath sounds. No wheezing.   Abdominal:      General: There is no distension.      Palpations: Abdomen is soft.      Tenderness: There is no abdominal tenderness. There is no guarding.   Skin:     General: Skin is warm.      Coloration: Skin is not pale.      Findings: No erythema or rash.      Comments: Good turgor   Neurological:      Mental Status: She is alert.   Psychiatric:         Mood and Affect: Mood normal.         Thought Content: Thought content normal.       Assessment:       1. Wellness examination    2. Encounter for screening colonoscopy    3. Uncontrolled type 2 diabetes mellitus with hyperglycemia, without long-term current use of insulin    4. Hypertension associated with diabetes        Plan:     Problem List Items Addressed This Visit          Cardiac/Vascular    Hypertension associated with diabetes    Relevant Medications    semaglutide (OZEMPIC) 0.25 mg or 0.5 mg(2 mg/1.5 mL) pen injector    losartan (COZAAR) 100 MG tablet       Endocrine    Uncontrolled type 2 diabetes mellitus with hyperglycemia, without long-term current use of insulin    Relevant Medications    rosuvastatin (CRESTOR) 5 MG tablet    semaglutide (OZEMPIC) 0.25 mg or 0.5 mg(2 mg/1.5 mL) pen injector       GI    Encounter for screening colonoscopy    Relevant Orders    Cologuard Screening (Multitarget Stool DNA)       Other    Wellness examination - Primary    Relevant Orders    CBC Auto Differential    Comprehensive Metabolic Panel    Hemoglobin A1C    Ferritin    Iron and TIBC    Lipid Panel    TSH     (In Office Administered) Zoster Recombinant Vaccine    T4, FREE

## 2023-04-25 RX ORDER — LEVOTHYROXINE SODIUM 175 UG/1
TABLET ORAL
Qty: 90 TABLET | Refills: 0 | Status: SHIPPED | OUTPATIENT
Start: 2023-04-25 | End: 2023-06-02 | Stop reason: SDUPTHER

## 2023-04-28 DIAGNOSIS — E11.65 UNCONTROLLED TYPE 2 DIABETES MELLITUS WITH HYPERGLYCEMIA, WITHOUT LONG-TERM CURRENT USE OF INSULIN: ICD-10-CM

## 2023-04-28 RX ORDER — SEMAGLUTIDE 1.34 MG/ML
0.5 INJECTION, SOLUTION SUBCUTANEOUS
Qty: 3 ML | Refills: 0 | Status: SHIPPED | OUTPATIENT
Start: 2023-04-28 | End: 2023-06-01 | Stop reason: SDUPTHER

## 2023-04-28 NOTE — TELEPHONE ENCOUNTER
----- Message from Cyndy Hirsch sent at 4/28/2023  1:24 PM CDT -----  Regarding: Prescription  .Type:  Pharmacy Calling to Clarify an RX    Name of Caller:Donaldo    Pharmacy Name:[facility]    Prescription Name:semaglutide (OZEMPIC) 0.25 mg or 0.5 mg(2 mg/1.5 mL) pen injector    What do they need to clarify?:to change ml size    Best Call Back Number:[phone]    Additional Information: Would like to know if doctor would like to change to 3ml they have been having trouble getting the 1.5ml

## 2023-04-29 ENCOUNTER — LAB VISIT (OUTPATIENT)
Dept: LAB | Facility: HOSPITAL | Age: 63
End: 2023-04-29
Attending: FAMILY MEDICINE
Payer: COMMERCIAL

## 2023-04-29 DIAGNOSIS — Z00.00 WELLNESS EXAMINATION: ICD-10-CM

## 2023-04-29 LAB
ALBUMIN SERPL BCP-MCNC: 4 G/DL (ref 3.5–5.2)
ALP SERPL-CCNC: 60 U/L (ref 55–135)
ALT SERPL W/O P-5'-P-CCNC: 19 U/L (ref 10–44)
ANION GAP SERPL CALC-SCNC: 9 MMOL/L (ref 8–16)
AST SERPL-CCNC: 19 U/L (ref 10–40)
BASOPHILS # BLD AUTO: 0.05 K/UL (ref 0–0.2)
BASOPHILS NFR BLD: 0.7 % (ref 0–1.9)
BILIRUB SERPL-MCNC: 0.5 MG/DL (ref 0.1–1)
BUN SERPL-MCNC: 13 MG/DL (ref 8–23)
CALCIUM SERPL-MCNC: 9.6 MG/DL (ref 8.7–10.5)
CHLORIDE SERPL-SCNC: 108 MMOL/L (ref 95–110)
CHOLEST SERPL-MCNC: 191 MG/DL (ref 120–199)
CHOLEST/HDLC SERPL: 5.5 {RATIO} (ref 2–5)
CO2 SERPL-SCNC: 24 MMOL/L (ref 23–29)
CREAT SERPL-MCNC: 0.9 MG/DL (ref 0.5–1.4)
DIFFERENTIAL METHOD: NORMAL
EOSINOPHIL # BLD AUTO: 0.3 K/UL (ref 0–0.5)
EOSINOPHIL NFR BLD: 4.4 % (ref 0–8)
ERYTHROCYTE [DISTWIDTH] IN BLOOD BY AUTOMATED COUNT: 12.8 % (ref 11.5–14.5)
EST. GFR  (NO RACE VARIABLE): >60 ML/MIN/1.73 M^2
ESTIMATED AVG GLUCOSE: 160 MG/DL (ref 68–131)
FERRITIN SERPL-MCNC: 140 NG/ML (ref 20–300)
GLUCOSE SERPL-MCNC: 184 MG/DL (ref 70–110)
HBA1C MFR BLD: 7.2 % (ref 4–5.6)
HCT VFR BLD AUTO: 41.1 % (ref 37–48.5)
HDLC SERPL-MCNC: 35 MG/DL (ref 40–75)
HDLC SERPL: 18.3 % (ref 20–50)
HGB BLD-MCNC: 13.4 G/DL (ref 12–16)
IMM GRANULOCYTES # BLD AUTO: 0.03 K/UL (ref 0–0.04)
IMM GRANULOCYTES NFR BLD AUTO: 0.4 % (ref 0–0.5)
IRON SERPL-MCNC: 91 UG/DL (ref 30–160)
LDLC SERPL CALC-MCNC: 125.4 MG/DL (ref 63–159)
LYMPHOCYTES # BLD AUTO: 1.9 K/UL (ref 1–4.8)
LYMPHOCYTES NFR BLD: 25.5 % (ref 18–48)
MCH RBC QN AUTO: 28.8 PG (ref 27–31)
MCHC RBC AUTO-ENTMCNC: 32.6 G/DL (ref 32–36)
MCV RBC AUTO: 88 FL (ref 82–98)
MONOCYTES # BLD AUTO: 0.4 K/UL (ref 0.3–1)
MONOCYTES NFR BLD: 5.2 % (ref 4–15)
NEUTROPHILS # BLD AUTO: 4.7 K/UL (ref 1.8–7.7)
NEUTROPHILS NFR BLD: 63.8 % (ref 38–73)
NONHDLC SERPL-MCNC: 156 MG/DL
NRBC BLD-RTO: 0 /100 WBC
PLATELET # BLD AUTO: 215 K/UL (ref 150–450)
PMV BLD AUTO: 11.8 FL (ref 9.2–12.9)
POTASSIUM SERPL-SCNC: 4.3 MMOL/L (ref 3.5–5.1)
PROT SERPL-MCNC: 8.1 G/DL (ref 6–8.4)
RBC # BLD AUTO: 4.65 M/UL (ref 4–5.4)
SATURATED IRON: 26 % (ref 20–50)
SODIUM SERPL-SCNC: 141 MMOL/L (ref 136–145)
T4 FREE SERPL-MCNC: 1.27 NG/DL (ref 0.71–1.51)
TOTAL IRON BINDING CAPACITY: 351 UG/DL (ref 250–450)
TRANSFERRIN SERPL-MCNC: 237 MG/DL (ref 200–375)
TRIGL SERPL-MCNC: 153 MG/DL (ref 30–150)
TSH SERPL DL<=0.005 MIU/L-ACNC: 0.32 UIU/ML (ref 0.4–4)
WBC # BLD AUTO: 7.32 K/UL (ref 3.9–12.7)

## 2023-04-29 PROCEDURE — 83036 HEMOGLOBIN GLYCOSYLATED A1C: CPT | Performed by: FAMILY MEDICINE

## 2023-04-29 PROCEDURE — 84443 ASSAY THYROID STIM HORMONE: CPT | Performed by: FAMILY MEDICINE

## 2023-04-29 PROCEDURE — 82728 ASSAY OF FERRITIN: CPT | Performed by: FAMILY MEDICINE

## 2023-04-29 PROCEDURE — 80061 LIPID PANEL: CPT | Performed by: FAMILY MEDICINE

## 2023-04-29 PROCEDURE — 84466 ASSAY OF TRANSFERRIN: CPT | Performed by: FAMILY MEDICINE

## 2023-04-29 PROCEDURE — 80053 COMPREHEN METABOLIC PANEL: CPT | Performed by: FAMILY MEDICINE

## 2023-04-29 PROCEDURE — 84439 ASSAY OF FREE THYROXINE: CPT | Performed by: FAMILY MEDICINE

## 2023-04-29 PROCEDURE — 85025 COMPLETE CBC W/AUTO DIFF WBC: CPT | Performed by: FAMILY MEDICINE

## 2023-04-29 PROCEDURE — 36415 COLL VENOUS BLD VENIPUNCTURE: CPT | Performed by: FAMILY MEDICINE

## 2023-05-22 LAB — NONINV COLON CA DNA+OCC BLD SCRN STL QL: NEGATIVE

## 2023-06-01 DIAGNOSIS — E03.9 HYPOTHYROIDISM, UNSPECIFIED TYPE: ICD-10-CM

## 2023-06-01 RX ORDER — LEVOTHYROXINE SODIUM 175 UG/1
TABLET ORAL
Qty: 90 TABLET | Refills: 0 | Status: CANCELLED | OUTPATIENT
Start: 2023-06-01

## 2023-06-02 RX ORDER — SEMAGLUTIDE 1.34 MG/ML
1 INJECTION, SOLUTION SUBCUTANEOUS
Qty: 3 ML | Refills: 0 | Status: SHIPPED | OUTPATIENT
Start: 2023-06-02 | End: 2023-06-27 | Stop reason: SDUPTHER

## 2023-06-02 RX ORDER — LEVOTHYROXINE SODIUM 175 UG/1
TABLET ORAL
Qty: 30 TABLET | Refills: 0 | Status: SHIPPED | OUTPATIENT
Start: 2023-06-02 | End: 2023-06-27 | Stop reason: SDUPTHER

## 2023-06-02 NOTE — TELEPHONE ENCOUNTER
----- Message from Phill Murphy sent at 6/1/2023  8:29 AM CDT -----  Contact: Patient  Type:  Patient Call          Who Called: Patient         Does the patient know what this is regarding?: requesting a call back back to have a refill on Rx semaglutide (OZEMPIC) 1 mg/dose (4 mg/3 mL) , levothyroxine (EUTHYROX) 175 MCG tablet           Would the patient rather a call back or a response via [facility]? call          Best Call Back Number: [phone]            Additional Information:   [facility] [place] - [address]  [address] [place] [address]  Phone: [phone] Fax: [phone]

## 2023-06-14 ENCOUNTER — PATIENT OUTREACH (OUTPATIENT)
Dept: ADMINISTRATIVE | Facility: HOSPITAL | Age: 63
End: 2023-06-14
Payer: COMMERCIAL

## 2023-06-14 NOTE — Clinical Note
Patient currently non-compliant for statin. Statin on Med Rec has , please reorder rosuvastatin to satisfy HM and Payor.

## 2023-06-14 NOTE — PROGRESS NOTES
Working non compliant Statin Report: Statin in Med Rec currently , chart routed to PCP to reorder. Next PCP visit 23.

## 2023-06-27 DIAGNOSIS — E03.9 HYPOTHYROIDISM, UNSPECIFIED TYPE: ICD-10-CM

## 2023-06-27 DIAGNOSIS — E11.65 UNCONTROLLED TYPE 2 DIABETES MELLITUS WITH HYPERGLYCEMIA, WITHOUT LONG-TERM CURRENT USE OF INSULIN: ICD-10-CM

## 2023-06-27 DIAGNOSIS — Z12.31 BREAST CANCER SCREENING BY MAMMOGRAM: Primary | ICD-10-CM

## 2023-06-27 RX ORDER — SEMAGLUTIDE 1.34 MG/ML
1 INJECTION, SOLUTION SUBCUTANEOUS
Qty: 3 ML | Refills: 0 | Status: SHIPPED | OUTPATIENT
Start: 2023-06-27 | End: 2023-07-27

## 2023-06-27 RX ORDER — LEVOTHYROXINE SODIUM 175 UG/1
TABLET ORAL
Qty: 30 TABLET | Refills: 0 | Status: SHIPPED | OUTPATIENT
Start: 2023-06-27 | End: 2023-10-13 | Stop reason: SDUPTHER

## 2023-06-27 NOTE — TELEPHONE ENCOUNTER
----- Message from Juliana You sent at 6/27/2023  2:14 PM CDT -----  Contact: pardeep Hernandez  Pt is calling to speak with the nurse regarding mammogram orders. Pt would like to get the orders to get scheduled. Please give pt a call back at .[phone]. Thanks MILTON

## 2023-07-24 DIAGNOSIS — E11.65 UNCONTROLLED TYPE 2 DIABETES MELLITUS WITH HYPERGLYCEMIA, WITHOUT LONG-TERM CURRENT USE OF INSULIN: ICD-10-CM

## 2023-07-24 PROBLEM — Z00.00 WELLNESS EXAMINATION: Status: RESOLVED | Noted: 2022-08-05 | Resolved: 2023-07-24

## 2023-07-24 RX ORDER — ROSUVASTATIN CALCIUM 5 MG/1
5 TABLET, COATED ORAL DAILY
Qty: 30 TABLET | Refills: 0 | Status: SHIPPED | OUTPATIENT
Start: 2023-07-24 | End: 2023-10-13 | Stop reason: SDUPTHER

## 2023-08-01 ENCOUNTER — PATIENT MESSAGE (OUTPATIENT)
Dept: OPHTHALMOLOGY | Facility: CLINIC | Age: 63
End: 2023-08-01

## 2023-08-01 ENCOUNTER — OFFICE VISIT (OUTPATIENT)
Dept: OPHTHALMOLOGY | Facility: CLINIC | Age: 63
End: 2023-08-01
Payer: COMMERCIAL

## 2023-08-01 DIAGNOSIS — H25.13 NUCLEAR SCLEROSIS, BILATERAL: ICD-10-CM

## 2023-08-01 DIAGNOSIS — H52.203 MYOPIA WITH ASTIGMATISM AND PRESBYOPIA, BILATERAL: ICD-10-CM

## 2023-08-01 DIAGNOSIS — H52.13 MYOPIA WITH ASTIGMATISM AND PRESBYOPIA, BILATERAL: ICD-10-CM

## 2023-08-01 DIAGNOSIS — H04.202 LEFT EPIPHORA: ICD-10-CM

## 2023-08-01 DIAGNOSIS — E11.9 TYPE 2 DIABETES MELLITUS WITHOUT RETINOPATHY: Primary | ICD-10-CM

## 2023-08-01 DIAGNOSIS — H52.4 MYOPIA WITH ASTIGMATISM AND PRESBYOPIA, BILATERAL: ICD-10-CM

## 2023-08-01 DIAGNOSIS — E11.36 DIABETIC CATARACT OF BOTH EYES: ICD-10-CM

## 2023-08-01 PROCEDURE — 1160F PR REVIEW ALL MEDS BY PRESCRIBER/CLIN PHARMACIST DOCUMENTED: ICD-10-PCS | Mod: CPTII,S$GLB,, | Performed by: OPTOMETRIST

## 2023-08-01 PROCEDURE — 3051F HG A1C>EQUAL 7.0%<8.0%: CPT | Mod: CPTII,S$GLB,, | Performed by: OPTOMETRIST

## 2023-08-01 PROCEDURE — 92015 DETERMINE REFRACTIVE STATE: CPT | Mod: S$GLB,,, | Performed by: OPTOMETRIST

## 2023-08-01 PROCEDURE — 1159F PR MEDICATION LIST DOCUMENTED IN MEDICAL RECORD: ICD-10-PCS | Mod: CPTII,S$GLB,, | Performed by: OPTOMETRIST

## 2023-08-01 PROCEDURE — 92015 PR REFRACTION: ICD-10-PCS | Mod: S$GLB,,, | Performed by: OPTOMETRIST

## 2023-08-01 PROCEDURE — 92014 COMPRE OPH EXAM EST PT 1/>: CPT | Mod: S$GLB,,, | Performed by: OPTOMETRIST

## 2023-08-01 PROCEDURE — 1160F RVW MEDS BY RX/DR IN RCRD: CPT | Mod: CPTII,S$GLB,, | Performed by: OPTOMETRIST

## 2023-08-01 PROCEDURE — 3051F PR MOST RECENT HEMOGLOBIN A1C LEVEL 7.0 - < 8.0%: ICD-10-PCS | Mod: CPTII,S$GLB,, | Performed by: OPTOMETRIST

## 2023-08-01 PROCEDURE — 4010F PR ACE/ARB THEARPY RXD/TAKEN: ICD-10-PCS | Mod: CPTII,S$GLB,, | Performed by: OPTOMETRIST

## 2023-08-01 PROCEDURE — 2023F DILAT RTA XM W/O RTNOPTHY: CPT | Mod: CPTII,S$GLB,, | Performed by: OPTOMETRIST

## 2023-08-01 PROCEDURE — 99999 PR PBB SHADOW E&M-EST. PATIENT-LVL II: CPT | Mod: PBBFAC,,, | Performed by: OPTOMETRIST

## 2023-08-01 PROCEDURE — 99999 PR PBB SHADOW E&M-EST. PATIENT-LVL II: ICD-10-PCS | Mod: PBBFAC,,, | Performed by: OPTOMETRIST

## 2023-08-01 PROCEDURE — 4010F ACE/ARB THERAPY RXD/TAKEN: CPT | Mod: CPTII,S$GLB,, | Performed by: OPTOMETRIST

## 2023-08-01 PROCEDURE — 1159F MED LIST DOCD IN RCRD: CPT | Mod: CPTII,S$GLB,, | Performed by: OPTOMETRIST

## 2023-08-01 PROCEDURE — 92014 PR EYE EXAM, EST PATIENT,COMPREHESV: ICD-10-PCS | Mod: S$GLB,,, | Performed by: OPTOMETRIST

## 2023-08-01 PROCEDURE — 2023F PR DILATED RETINAL EXAM W/O EVID OF RETINOPATHY: ICD-10-PCS | Mod: CPTII,S$GLB,, | Performed by: OPTOMETRIST

## 2023-08-01 NOTE — PROGRESS NOTES
HPI     Diabetic Eye Exam            Comments: Diagnosed with diabetes approx 10+yrs   Lab Results       Component                Value               Date                       HGBA1C                   7.2 (H)             04/29/2023              Vision changes since last eye exam?: yes, OS hall w and w/o CTL in. Pt   feels like it may be the previous change in [place] or new Ozempic 1mg dose.   Notices after she eats   Any eye pain today: no  Other ocular symptoms: no  Interested in contact lens fitting today? Yes          Eye Problem            Comments: Pt has been on thyroid meds for 30+yrs and is worried about   thyroid eye disease          Last edited by Kat Walters MA on 8/1/2023  2:24 PM.            Assessment /Plan     For exam results, see Encounter Report.    Type 2 diabetes mellitus without retinopathy  There was no diabetic retinopathy present in either eye today.   Recommended that pt continue care with PCP and/or specialists regarding diabetes.  Follow-up dilated eye exam recommended in 12 months, sooner with any vision changes or new concerns.    Nuclear sclerosis, bilateral  Diabetic cataract of both eyes  Cataracts not significantly affecting activities of daily living and therefore surgery is not indicated at this time.   Will continue to monitor over the next 12 months. Pt to call or RTC with any significant change in vision prior to next visit.     Left epiphora  Discussed D&I in the future if symptoms worsen or persist    Myopia with astigmatism and presbyopia, bilateral    Eyeglass Final Rx       Eyeglass Final Rx         Sphere Cylinder Axis    Right -2.00 +1.25 015    Left -2.25 +0.75 168      Expiration Date: 8/1/2024                  Contact Lens Prescription (8/1/2023)          Brand Base Curve Diameter Sphere Cylinder Axis    Right no lens         Left Total30 for astigmatism 8.6 14.5 -1.50 -0.75 080      Expiration Date: 8/1/2024    Replacement: Monthly    Wearing Schedule: Daily Wear           Normal gOCT today with nice, symmetric GCL OU      RTC 1 yr for dilated eye exam or PRN if any problems.   Discussed above and answered questions.

## 2023-10-13 ENCOUNTER — OFFICE VISIT (OUTPATIENT)
Dept: INTERNAL MEDICINE | Facility: CLINIC | Age: 63
End: 2023-10-13
Payer: COMMERCIAL

## 2023-10-13 ENCOUNTER — HOSPITAL ENCOUNTER (OUTPATIENT)
Dept: RADIOLOGY | Facility: HOSPITAL | Age: 63
Discharge: HOME | End: 2023-10-13
Attending: FAMILY MEDICINE
Payer: COMMERCIAL

## 2023-10-13 VITALS
BODY MASS INDEX: 34.98 KG/M2 | WEIGHT: 217.63 LBS | OXYGEN SATURATION: 95 % | DIASTOLIC BLOOD PRESSURE: 82 MMHG | TEMPERATURE: 99 F | HEIGHT: 66 IN | SYSTOLIC BLOOD PRESSURE: 124 MMHG | HEART RATE: 92 BPM

## 2023-10-13 DIAGNOSIS — E11.65 UNCONTROLLED TYPE 2 DIABETES MELLITUS WITH HYPERGLYCEMIA, WITHOUT LONG-TERM CURRENT USE OF INSULIN: Primary | ICD-10-CM

## 2023-10-13 DIAGNOSIS — E78.5 HYPERLIPIDEMIA ASSOCIATED WITH TYPE 2 DIABETES MELLITUS: ICD-10-CM

## 2023-10-13 DIAGNOSIS — I15.2 HYPERTENSION ASSOCIATED WITH DIABETES: ICD-10-CM

## 2023-10-13 DIAGNOSIS — E11.59 HYPERTENSION ASSOCIATED WITH DIABETES: ICD-10-CM

## 2023-10-13 DIAGNOSIS — Z12.31 BREAST CANCER SCREENING BY MAMMOGRAM: ICD-10-CM

## 2023-10-13 DIAGNOSIS — Z12.31 SCREENING MAMMOGRAM, ENCOUNTER FOR: ICD-10-CM

## 2023-10-13 DIAGNOSIS — Z12.11 ENCOUNTER FOR SCREENING COLONOSCOPY: ICD-10-CM

## 2023-10-13 DIAGNOSIS — E11.69 HYPERLIPIDEMIA ASSOCIATED WITH TYPE 2 DIABETES MELLITUS: ICD-10-CM

## 2023-10-13 DIAGNOSIS — E03.9 HYPOTHYROIDISM, UNSPECIFIED TYPE: ICD-10-CM

## 2023-10-13 LAB
BILIRUB UR QL STRIP: NEGATIVE
CLARITY UR REFRACT.AUTO: ABNORMAL
COLOR UR AUTO: YELLOW
GLUCOSE UR QL STRIP: NEGATIVE
HGB UR QL STRIP: NEGATIVE
KETONES UR QL STRIP: ABNORMAL
LEUKOCYTE ESTERASE UR QL STRIP: NEGATIVE
NITRITE UR QL STRIP: NEGATIVE
PH UR STRIP: 6 [PH] (ref 5–8)
PROT UR QL STRIP: ABNORMAL
SP GR UR STRIP: 1.03 (ref 1–1.03)
URN SPEC COLLECT METH UR: ABNORMAL

## 2023-10-13 PROCEDURE — 3074F PR MOST RECENT SYSTOLIC BLOOD PRESSURE < 130 MM HG: ICD-10-PCS | Mod: CPTII,S$GLB,, | Performed by: FAMILY MEDICINE

## 2023-10-13 PROCEDURE — 3051F HG A1C>EQUAL 7.0%<8.0%: CPT | Mod: CPTII,S$GLB,, | Performed by: FAMILY MEDICINE

## 2023-10-13 PROCEDURE — 99999 PR PBB SHADOW E&M-EST. PATIENT-LVL III: ICD-10-PCS | Mod: PBBFAC,,, | Performed by: FAMILY MEDICINE

## 2023-10-13 PROCEDURE — 77067 MAMMO DIGITAL SCREENING BILAT WITH TOMO: ICD-10-PCS | Mod: 26,,, | Performed by: RADIOLOGY

## 2023-10-13 PROCEDURE — 1159F MED LIST DOCD IN RCRD: CPT | Mod: CPTII,S$GLB,, | Performed by: FAMILY MEDICINE

## 2023-10-13 PROCEDURE — 3008F BODY MASS INDEX DOCD: CPT | Mod: CPTII,S$GLB,, | Performed by: FAMILY MEDICINE

## 2023-10-13 PROCEDURE — 3008F PR BODY MASS INDEX (BMI) DOCUMENTED: ICD-10-PCS | Mod: CPTII,S$GLB,, | Performed by: FAMILY MEDICINE

## 2023-10-13 PROCEDURE — 3074F SYST BP LT 130 MM HG: CPT | Mod: CPTII,S$GLB,, | Performed by: FAMILY MEDICINE

## 2023-10-13 PROCEDURE — 99214 PR OFFICE/OUTPT VISIT, EST, LEVL IV, 30-39 MIN: ICD-10-PCS | Mod: S$GLB,,, | Performed by: FAMILY MEDICINE

## 2023-10-13 PROCEDURE — 77063 MAMMO DIGITAL SCREENING BILAT WITH TOMO: ICD-10-PCS | Mod: 26,,, | Performed by: RADIOLOGY

## 2023-10-13 PROCEDURE — 3051F PR MOST RECENT HEMOGLOBIN A1C LEVEL 7.0 - < 8.0%: ICD-10-PCS | Mod: CPTII,S$GLB,, | Performed by: FAMILY MEDICINE

## 2023-10-13 PROCEDURE — 4010F PR ACE/ARB THEARPY RXD/TAKEN: ICD-10-PCS | Mod: CPTII,S$GLB,, | Performed by: FAMILY MEDICINE

## 2023-10-13 PROCEDURE — 1160F RVW MEDS BY RX/DR IN RCRD: CPT | Mod: CPTII,S$GLB,, | Performed by: FAMILY MEDICINE

## 2023-10-13 PROCEDURE — 4010F ACE/ARB THERAPY RXD/TAKEN: CPT | Mod: CPTII,S$GLB,, | Performed by: FAMILY MEDICINE

## 2023-10-13 PROCEDURE — 3079F DIAST BP 80-89 MM HG: CPT | Mod: CPTII,S$GLB,, | Performed by: FAMILY MEDICINE

## 2023-10-13 PROCEDURE — 1160F PR REVIEW ALL MEDS BY PRESCRIBER/CLIN PHARMACIST DOCUMENTED: ICD-10-PCS | Mod: CPTII,S$GLB,, | Performed by: FAMILY MEDICINE

## 2023-10-13 PROCEDURE — 77067 SCR MAMMO BI INCL CAD: CPT | Mod: 26,,, | Performed by: RADIOLOGY

## 2023-10-13 PROCEDURE — 77063 BREAST TOMOSYNTHESIS BI: CPT | Mod: 26,,, | Performed by: RADIOLOGY

## 2023-10-13 PROCEDURE — 99999 PR PBB SHADOW E&M-EST. PATIENT-LVL III: CPT | Mod: PBBFAC,,, | Performed by: FAMILY MEDICINE

## 2023-10-13 PROCEDURE — 81003 URINALYSIS AUTO W/O SCOPE: CPT | Performed by: FAMILY MEDICINE

## 2023-10-13 PROCEDURE — 3079F PR MOST RECENT DIASTOLIC BLOOD PRESSURE 80-89 MM HG: ICD-10-PCS | Mod: CPTII,S$GLB,, | Performed by: FAMILY MEDICINE

## 2023-10-13 PROCEDURE — 77067 SCR MAMMO BI INCL CAD: CPT | Mod: TC,PO

## 2023-10-13 PROCEDURE — 1159F PR MEDICATION LIST DOCUMENTED IN MEDICAL RECORD: ICD-10-PCS | Mod: CPTII,S$GLB,, | Performed by: FAMILY MEDICINE

## 2023-10-13 PROCEDURE — 99214 OFFICE O/P EST MOD 30 MIN: CPT | Mod: S$GLB,,, | Performed by: FAMILY MEDICINE

## 2023-10-13 RX ORDER — LOSARTAN POTASSIUM 100 MG/1
100 TABLET ORAL DAILY
Qty: 90 TABLET | Refills: 1 | Status: SHIPPED | OUTPATIENT
Start: 2023-10-13 | End: 2024-04-10

## 2023-10-13 RX ORDER — LEVOTHYROXINE SODIUM 175 UG/1
TABLET ORAL
Qty: 90 TABLET | Refills: 1 | Status: SHIPPED | OUTPATIENT
Start: 2023-10-13

## 2023-10-13 RX ORDER — ROSUVASTATIN CALCIUM 5 MG/1
5 TABLET, COATED ORAL DAILY
Qty: 90 TABLET | Refills: 1 | Status: SHIPPED | OUTPATIENT
Start: 2023-10-13 | End: 2024-04-10

## 2023-10-13 RX ORDER — SEMAGLUTIDE 0.68 MG/ML
INJECTION, SOLUTION SUBCUTANEOUS
Qty: 1 EACH | Refills: 0 | Status: SHIPPED | OUTPATIENT
Start: 2023-10-13 | End: 2023-11-26

## 2023-10-13 NOTE — PROGRESS NOTES
Subjective:       Patient ID: Mary Patterson is a 63 y.o. female.    Chief Complaint: Diabetes    Diabetes  She presents for her follow-up diabetic visit. She has type 2 diabetes mellitus. Her disease course has been stable. Pertinent negatives for hypoglycemia include no confusion, dizziness or headaches. Pertinent negatives for diabetes include no blurred vision, no chest pain and no fatigue. Symptoms are stable.     Review of Systems   Constitutional:  Negative for fatigue.   Eyes:  Negative for blurred vision.   Respiratory:  Negative for shortness of breath.    Cardiovascular:  Negative for chest pain.   Gastrointestinal:  Negative for abdominal pain.   Neurological:  Negative for dizziness and headaches.   Psychiatric/Behavioral:  Negative for confusion.        Objective:      Physical Exam  Vitals and nursing note reviewed.   Constitutional:       General: She is not in acute distress.     Appearance: Normal appearance. She is well-developed. She is not diaphoretic.   HENT:      Head: Normocephalic and atraumatic.   Pulmonary:      Effort: Pulmonary effort is normal. No respiratory distress.      Breath sounds: Normal breath sounds. No wheezing.   Skin:     General: Skin is warm and dry.      Findings: No erythema or rash.   Neurological:      Mental Status: She is alert.       Assessment:       1. Uncontrolled type 2 diabetes mellitus with hyperglycemia, without long-term current use of insulin    2. Hyperlipidemia associated with type 2 diabetes mellitus    3. Hypothyroidism, unspecified type    4. Encounter for screening colonoscopy    5. Screening mammogram, encounter for    6. Hypertension associated with diabetes        Plan:     Problem List Items Addressed This Visit          Cardiac/Vascular    Hypertension associated with diabetes    Relevant Medications    losartan (COZAAR) 100 MG tablet    semaglutide (OZEMPIC) 0.25 mg or 0.5 mg (2 mg/3 mL) pen injector    Hyperlipidemia associated with type 2  diabetes mellitus    Overview     Not willing to do statin at this time understands increased risk for cardiovascular events being a diabetic uncontrolled         Relevant Medications    semaglutide (OZEMPIC) 0.25 mg or 0.5 mg (2 mg/3 mL) pen injector       Renal/    Screening mammogram, encounter for       Endocrine    Uncontrolled type 2 diabetes mellitus with hyperglycemia, without long-term current use of insulin - Primary    Overview     Chronic, Stable cont ozempic         Relevant Medications    rosuvastatin (CRESTOR) 5 MG tablet    semaglutide (OZEMPIC) 0.25 mg or 0.5 mg (2 mg/3 mL) pen injector    Other Relevant Orders    Hemoglobin A1C    Lipid Panel    Microalbumin/Creatinine Ratio, Urine    Comprehensive Metabolic Panel    Sedimentation rate    C-reactive protein    FADI    Cyclic citrul peptide antibody, IgG    Urinalysis    Hypothyroidism    Overview     Chronic, Stable, cont synthroid         Relevant Medications    levothyroxine (EUTHYROX) 175 MCG tablet    Other Relevant Orders    T4, FREE    T3       GI    Encounter for screening colonoscopy    Relevant Orders    Cologuard Screening (Multitarget Stool DNA)

## 2023-10-16 ENCOUNTER — TELEPHONE (OUTPATIENT)
Dept: INTERNAL MEDICINE | Facility: CLINIC | Age: 63
End: 2023-10-16
Payer: COMMERCIAL

## 2024-02-25 NOTE — TELEPHONE ENCOUNTER
"Her thyroid labs show that her thyroid is in good range. Actually better than before. She reported that she thought she was on "too high" a dose, but I believe she is at good level. I would continue with the 175mcg dose.   "
----- Message from Rashmi You sent at 6/28/2018  2:59 PM CDT -----  Contact: pt   Call pt regarding lab results.    [phone]    
Called and notified pt she verbalized understanding. She would like to have medicine sent in for her to have he generic levothyroxine sent in for a #90 day supply since [place].   
Pt is calling regarding results of thyroid so she can get a refill on meds before leaving the country?   
The patient is a 6y7m Male complaining of throat, pain.

## 2024-05-24 ENCOUNTER — OFFICE VISIT (OUTPATIENT)
Dept: INTERNAL MEDICINE | Facility: CLINIC | Age: 64
End: 2024-05-24
Payer: COMMERCIAL

## 2024-05-24 ENCOUNTER — LAB VISIT (OUTPATIENT)
Dept: LAB | Facility: HOSPITAL | Age: 64
End: 2024-05-24
Payer: COMMERCIAL

## 2024-05-24 ENCOUNTER — LAB VISIT (OUTPATIENT)
Dept: LAB | Facility: HOSPITAL | Age: 64
End: 2024-05-24
Attending: FAMILY MEDICINE
Payer: COMMERCIAL

## 2024-05-24 VITALS
HEIGHT: 66 IN | DIASTOLIC BLOOD PRESSURE: 80 MMHG | OXYGEN SATURATION: 97 % | SYSTOLIC BLOOD PRESSURE: 124 MMHG | HEART RATE: 86 BPM | WEIGHT: 213.38 LBS | BODY MASS INDEX: 34.29 KG/M2

## 2024-05-24 DIAGNOSIS — E11.65 UNCONTROLLED TYPE 2 DIABETES MELLITUS WITH HYPERGLYCEMIA, WITHOUT LONG-TERM CURRENT USE OF INSULIN: ICD-10-CM

## 2024-05-24 DIAGNOSIS — Z91.148 NON COMPLIANCE W MEDICATION REGIMEN: ICD-10-CM

## 2024-05-24 DIAGNOSIS — E11.59 HYPERTENSION ASSOCIATED WITH DIABETES: Chronic | ICD-10-CM

## 2024-05-24 DIAGNOSIS — I15.2 HYPERTENSION ASSOCIATED WITH DIABETES: Chronic | ICD-10-CM

## 2024-05-24 DIAGNOSIS — E78.5 HYPERLIPIDEMIA ASSOCIATED WITH TYPE 2 DIABETES MELLITUS: Chronic | ICD-10-CM

## 2024-05-24 DIAGNOSIS — E03.9 ACQUIRED HYPOTHYROIDISM: ICD-10-CM

## 2024-05-24 DIAGNOSIS — E11.69 HYPERLIPIDEMIA ASSOCIATED WITH TYPE 2 DIABETES MELLITUS: Chronic | ICD-10-CM

## 2024-05-24 DIAGNOSIS — E11.65 UNCONTROLLED TYPE 2 DIABETES MELLITUS WITH HYPERGLYCEMIA, WITHOUT LONG-TERM CURRENT USE OF INSULIN: Primary | ICD-10-CM

## 2024-05-24 PROBLEM — Z12.11 ENCOUNTER FOR SCREENING COLONOSCOPY: Status: RESOLVED | Noted: 2023-04-24 | Resolved: 2024-05-24

## 2024-05-24 PROBLEM — Z12.31 SCREENING MAMMOGRAM, ENCOUNTER FOR: Status: RESOLVED | Noted: 2023-10-13 | Resolved: 2024-05-24

## 2024-05-24 PROCEDURE — G2211 COMPLEX E/M VISIT ADD ON: HCPCS | Mod: S$GLB,,, | Performed by: FAMILY MEDICINE

## 2024-05-24 PROCEDURE — 1160F RVW MEDS BY RX/DR IN RCRD: CPT | Mod: CPTII,S$GLB,, | Performed by: FAMILY MEDICINE

## 2024-05-24 PROCEDURE — 84443 ASSAY THYROID STIM HORMONE: CPT | Performed by: FAMILY MEDICINE

## 2024-05-24 PROCEDURE — 36415 COLL VENOUS BLD VENIPUNCTURE: CPT | Performed by: FAMILY MEDICINE

## 2024-05-24 PROCEDURE — 3079F DIAST BP 80-89 MM HG: CPT | Mod: CPTII,S$GLB,, | Performed by: FAMILY MEDICINE

## 2024-05-24 PROCEDURE — 3074F SYST BP LT 130 MM HG: CPT | Mod: CPTII,S$GLB,, | Performed by: FAMILY MEDICINE

## 2024-05-24 PROCEDURE — 3066F NEPHROPATHY DOC TX: CPT | Mod: CPTII,S$GLB,, | Performed by: FAMILY MEDICINE

## 2024-05-24 PROCEDURE — 99214 OFFICE O/P EST MOD 30 MIN: CPT | Mod: S$GLB,,, | Performed by: FAMILY MEDICINE

## 2024-05-24 PROCEDURE — 99999 PR PBB SHADOW E&M-EST. PATIENT-LVL III: CPT | Mod: PBBFAC,,, | Performed by: FAMILY MEDICINE

## 2024-05-24 PROCEDURE — 3046F HEMOGLOBIN A1C LEVEL >9.0%: CPT | Mod: CPTII,S$GLB,, | Performed by: FAMILY MEDICINE

## 2024-05-24 PROCEDURE — 1159F MED LIST DOCD IN RCRD: CPT | Mod: CPTII,S$GLB,, | Performed by: FAMILY MEDICINE

## 2024-05-24 PROCEDURE — 83036 HEMOGLOBIN GLYCOSYLATED A1C: CPT | Performed by: FAMILY MEDICINE

## 2024-05-24 PROCEDURE — 82043 UR ALBUMIN QUANTITATIVE: CPT | Performed by: FAMILY MEDICINE

## 2024-05-24 PROCEDURE — 80061 LIPID PANEL: CPT | Performed by: FAMILY MEDICINE

## 2024-05-24 PROCEDURE — 3008F BODY MASS INDEX DOCD: CPT | Mod: CPTII,S$GLB,, | Performed by: FAMILY MEDICINE

## 2024-05-24 PROCEDURE — 3061F NEG MICROALBUMINURIA REV: CPT | Mod: CPTII,S$GLB,, | Performed by: FAMILY MEDICINE

## 2024-05-24 PROCEDURE — 84439 ASSAY OF FREE THYROXINE: CPT | Performed by: FAMILY MEDICINE

## 2024-05-24 NOTE — PROGRESS NOTES
"Subjective:   Patient ID: Mary Patterson is a 64 y.o. female.  Chief Complaint:  Establish Care and Medication Refill    Presents to establish care   History of diabetes, hyperlipidemia hypothyroidism, medical noncompliance   Previous PCP Dr. Vargas   Visit 10/20/2023  A1c 8.2%.  Diabetes uncontrolled.    .  Lipid panel and not at goal.    Free T4 decreased.  T3 normal.  FADI screen positive.  Titer panel negative.  Blood pressure stable  Currently reports compliance with thyroid supplement   Otherwise not taking any medication for diabetes or cholesterol   Reports intolerance to Ozempic, Trulicity, and metformin.  Denies any previous difficulty Jardiance, Actos or Januvia.  Denies any known statin intolerance.  Asymptomatic.      Review of Systems   Constitutional:  Negative for chills, diaphoresis, fatigue and fever.   Eyes:  Negative for visual disturbance.   Respiratory:  Negative for cough, chest tightness, shortness of breath and wheezing.    Cardiovascular:  Negative for chest pain, palpitations and leg swelling.   Gastrointestinal:  Negative for abdominal distention, abdominal pain, constipation, diarrhea, nausea and vomiting.   Endocrine: Negative for polydipsia, polyphagia and polyuria.   Genitourinary:  Negative for difficulty urinating, dysuria, flank pain, frequency, hematuria and urgency.   Musculoskeletal:  Negative for myalgias.   Skin:  Negative for rash.   Neurological:  Negative for dizziness, syncope, weakness, light-headedness, numbness and headaches.   Psychiatric/Behavioral:  Negative for sleep disturbance. The patient is not nervous/anxious.      Objective:   /80 (BP Location: Left arm, Patient Position: Sitting, BP Method: Large (Manual))   Pulse 86   Ht 5' 5.5" (1.664 m)   Wt 96.8 kg (213 lb 6.5 oz)   LMP  (LMP Unknown)   SpO2 97%   BMI 34.97 kg/m²     Physical Exam  Vitals and nursing note reviewed.   Constitutional:       Appearance: Normal appearance. She is " well-developed. She is obese.   Eyes:      General: No scleral icterus.     Conjunctiva/sclera: Conjunctivae normal.   Neck:      Vascular: No carotid bruit or JVD.   Cardiovascular:      Rate and Rhythm: Normal rate and regular rhythm.      Pulses:           Dorsalis pedis pulses are 2+ on the right side and 2+ on the left side.        Posterior tibial pulses are 2+ on the right side and 2+ on the left side.      Heart sounds: Normal heart sounds.   Pulmonary:      Effort: Pulmonary effort is normal.      Breath sounds: Normal breath sounds.   Abdominal:      General: There is no distension.      Palpations: Abdomen is soft. There is no hepatomegaly.      Tenderness: There is no abdominal tenderness. There is no guarding or rebound.   Musculoskeletal:      Right lower leg: No edema.      Left lower leg: No edema.      Right foot: Normal range of motion. No deformity or bunion.      Left foot: Normal range of motion. No deformity or bunion.   Feet:      Right foot:      Protective Sensation: 5 sites tested.  5 sites sensed.      Skin integrity: No ulcer, blister, skin breakdown, erythema, warmth, callus, dry skin or fissure.      Toenail Condition: Right toenails are normal.      Left foot:      Protective Sensation: 5 sites tested.  5 sites sensed.      Skin integrity: No ulcer, blister, skin breakdown, erythema, warmth, callus, dry skin or fissure.      Toenail Condition: Left toenails are normal.   Skin:     Findings: No rash.   Neurological:      Mental Status: She is alert.   Psychiatric:         Mood and Affect: Mood and affect normal.         Assessment:       ICD-10-CM ICD-9-CM   1. Uncontrolled type 2 diabetes mellitus with hyperglycemia, without long-term current use of insulin  E11.65 250.02   2. Acquired hypothyroidism  E03.9 244.9   3. Hyperlipidemia associated with type 2 diabetes mellitus  E11.69 250.80    E78.5 272.4   4. Hypertension associated with diabetes  E11.59 250.80    I15.2 401.9   5. Non  compliance w medication regimen  Z91.148 V15.81     Plan:   Uncontrolled type 2 diabetes mellitus with hyperglycemia, without long-term current use of insulin  -     Hemoglobin A1C; Future; Expected date: 05/24/2024  -     Microalbumin/Creatinine Ratio, Urine; Future; Expected date: 05/24/2024  Asymptomatic.  Uncontrolled.  Noncompliant.    A1c done today 11 0.3%.    Hopefully will take Jardiance 25 mg daily and Januvia 100 mg daily as prescribed   Eye exam scheduled   Microalbumin ordered   Foot exam stable     Acquired hypothyroidism  -     TSH; Future; Expected date: 05/24/2024  Check TSH   Adjust current thyroid supplement if indicated     Hyperlipidemia associated with type 2 diabetes mellitus  -     Lipid Panel; Future; Expected date: 05/24/2024  Check lipid panel   Discussed indication statin  Hopefully will take Crestor 5 mg daily as prescribed     Hypertension associated with diabetes  Controlled rate stable.  Asymptomatic.  BP at goal.    Okay remain off medication.    Non compliance w medication regimen  Rediscussed significant health risks from having uncontrolled diabetes and cholesterol   Expressed understanding to nose risk  No other significant barriers identified with regards to compliance  Does not appear financial related  Hopefully patient will compliant with above medications   Recheck labs 3 months    Return to clinic 3 months or sooner as needed    Visit today included increased complexity associated with managing the longitudinal care of the patient due to the serious and/or complex managed problem(s) listed above.

## 2024-05-25 LAB
ALBUMIN/CREAT UR: 6 UG/MG (ref 0–30)
CHOLEST SERPL-MCNC: 209 MG/DL (ref 120–199)
CHOLEST/HDLC SERPL: 5.2 {RATIO} (ref 2–5)
CREAT UR-MCNC: 232 MG/DL (ref 15–325)
ESTIMATED AVG GLUCOSE: 278 MG/DL (ref 68–131)
HBA1C MFR BLD: 11.3 % (ref 4–5.6)
HDLC SERPL-MCNC: 40 MG/DL (ref 40–75)
HDLC SERPL: 19.1 % (ref 20–50)
LDLC SERPL CALC-MCNC: 135.2 MG/DL (ref 63–159)
MICROALBUMIN UR DL<=1MG/L-MCNC: 14 UG/ML
NONHDLC SERPL-MCNC: 169 MG/DL
T4 FREE SERPL-MCNC: 0.92 NG/DL (ref 0.71–1.51)
TRIGL SERPL-MCNC: 169 MG/DL (ref 30–150)
TSH SERPL DL<=0.005 MIU/L-ACNC: 11.02 UIU/ML (ref 0.4–4)

## 2024-05-27 ENCOUNTER — TELEPHONE (OUTPATIENT)
Dept: INTERNAL MEDICINE | Facility: CLINIC | Age: 64
End: 2024-05-27
Payer: COMMERCIAL

## 2024-05-27 DIAGNOSIS — E03.9 HYPOTHYROIDISM, UNSPECIFIED TYPE: ICD-10-CM

## 2024-05-27 DIAGNOSIS — E11.65 UNCONTROLLED TYPE 2 DIABETES MELLITUS WITH HYPERGLYCEMIA, WITHOUT LONG-TERM CURRENT USE OF INSULIN: Primary | Chronic | ICD-10-CM

## 2024-05-27 DIAGNOSIS — E78.5 HYPERLIPIDEMIA ASSOCIATED WITH TYPE 2 DIABETES MELLITUS: Primary | Chronic | ICD-10-CM

## 2024-05-27 DIAGNOSIS — E11.69 HYPERLIPIDEMIA ASSOCIATED WITH TYPE 2 DIABETES MELLITUS: Primary | Chronic | ICD-10-CM

## 2024-05-27 DIAGNOSIS — E11.65 UNCONTROLLED TYPE 2 DIABETES MELLITUS WITH HYPERGLYCEMIA, WITHOUT LONG-TERM CURRENT USE OF INSULIN: ICD-10-CM

## 2024-05-27 RX ORDER — LEVOTHYROXINE SODIUM 175 UG/1
175 TABLET ORAL DAILY
Qty: 90 TABLET | Refills: 3 | Status: SHIPPED | OUTPATIENT
Start: 2024-05-27 | End: 2025-05-27

## 2024-05-27 RX ORDER — ROSUVASTATIN CALCIUM 5 MG/1
5 TABLET, COATED ORAL DAILY
Qty: 30 TABLET | Refills: 11 | Status: SHIPPED | OUTPATIENT
Start: 2024-05-27 | End: 2025-05-27

## 2024-05-27 NOTE — TELEPHONE ENCOUNTER
----- Message from Maryellen Camacho sent at 5/27/2024  1:57 PM CDT -----  Contact: self  [phone]  .Type:  Patient Returning Call    Who Called:pt  Who Left Message for Patient:staff  Does the patient know what this is regarding?: prescription refill order for right dose of medication   Would the patient rather a call back or a response via [facility]? Call back   Best Call Back Number:.[phone]    Additional Information: =

## 2024-05-27 NOTE — TELEPHONE ENCOUNTER
Spoke with pt; pt stated she need refills on Levothroxine she stated she's been out of medication for 3 days; she also states she didn't want to come back into office to discuss medication for glucose; she states she doesn't mind starting medication (whatever PCP recommends) and she doesn't want to do any injections /LD

## 2024-05-27 NOTE — TELEPHONE ENCOUNTER
----- Message from Yadira Osorio sent at 5/27/2024  4:42 PM CDT -----  Regarding: rx  Type:  Patient Returning Call    Who Called:Rupinder  Who Left Message for Patient:Gaetano  Does the patient know what this is regarding?:medication concerns  Would the patient rather a call back or a response via [facility]? Call back  Best Call Back Number: [phone]  Additional Information:

## 2024-05-27 NOTE — TELEPHONE ENCOUNTER
Spoke with pt; MA informed her PCP refilled thyroid medication; MA also informed pt PCP sent medication for her diabetes and to return to clinic in 3 months; pt verbalized understanding /LD

## 2024-06-04 ENCOUNTER — TELEPHONE (OUTPATIENT)
Dept: OPHTHALMOLOGY | Facility: CLINIC | Age: 64
End: 2024-06-04
Payer: COMMERCIAL

## 2024-06-04 NOTE — TELEPHONE ENCOUNTER
----- Message from Denise Stovall sent at 6/4/2024  3:19 PM CDT -----  Name of Who is Calling:  pt         What is the request in detail:requesting to schedule her annual eye exam           Can the clinic reply by [facility]:no           What Number to Call Back if not in [facility]: [phone]

## 2024-06-11 ENCOUNTER — OFFICE VISIT (OUTPATIENT)
Dept: OPHTHALMOLOGY | Facility: CLINIC | Age: 64
End: 2024-06-11
Payer: COMMERCIAL

## 2024-06-11 DIAGNOSIS — H43.813 POSTERIOR VITREOUS DETACHMENT OF BOTH EYES: ICD-10-CM

## 2024-06-11 DIAGNOSIS — H25.13 NUCLEAR SCLEROSIS, BILATERAL: ICD-10-CM

## 2024-06-11 DIAGNOSIS — E11.9 DIABETES MELLITUS WITHOUT COMPLICATION: Primary | ICD-10-CM

## 2024-06-11 DIAGNOSIS — H52.13 MYOPIA WITH ASTIGMATISM AND PRESBYOPIA, BILATERAL: ICD-10-CM

## 2024-06-11 DIAGNOSIS — E11.36 DIABETIC CATARACT: ICD-10-CM

## 2024-06-11 DIAGNOSIS — H52.203 MYOPIA WITH ASTIGMATISM AND PRESBYOPIA, BILATERAL: ICD-10-CM

## 2024-06-11 DIAGNOSIS — H52.4 MYOPIA WITH ASTIGMATISM AND PRESBYOPIA, BILATERAL: ICD-10-CM

## 2024-06-11 PROCEDURE — 92014 COMPRE OPH EXAM EST PT 1/>: CPT | Mod: ,,, | Performed by: OPTOMETRIST

## 2024-06-11 PROCEDURE — 92015 DETERMINE REFRACTIVE STATE: CPT | Mod: ,,, | Performed by: OPTOMETRIST

## 2024-06-11 PROCEDURE — 99999 PR PBB SHADOW E&M-EST. PATIENT-LVL II: CPT | Mod: PBBFAC,,, | Performed by: OPTOMETRIST

## 2024-06-11 PROCEDURE — 92310 CONTACT LENS FITTING OU: CPT | Mod: CSM,S$GLB,, | Performed by: OPTOMETRIST

## 2024-06-11 NOTE — PROGRESS NOTES
HPI     Diabetic Eye Exam            Comments: Lab Results       Component                Value               Date                       HGBA1C                   11.3 (H)            05/24/2024                      Comments    Lab Results       Component                Value               Date                       HGBA1C                   11.3 (H)            05/24/2024              Pt. States blood sugar has been fluctuating.  Wears 1 contact on OS for near  Father has glaucoma  Concerned about cataracts.          Last edited by Smiley Whyte MA on 6/11/2024 11:10 AM.            Assessment /Plan     For exam results, see Encounter Report.    1. Diabetes mellitus without complication  10 years, last A1c 11.3 There was no diabetic retinopathy present on either eye on examination today. Recommend good blood pressure control, strict blood glucose control, and good cholesterol control.  Continue close care with Dr. Santiago regarding diabetes.    2. Diabetic cataract   Nuclear sclerosis, bilateral  Cataracts are not visually significant and not affecting activities of daily living. Annual observation is recommended at this time. Patient to call or return to clinic with any significant change in vision prior to next visit.    3. Posterior vitreous detachment of both eyes  The nature of posterior vitreous detachment was discussed with the patient.  Signs and symptoms of retinal detachment were discussed with patient.   No holes or tears were noted upon careful retinal examination after dilation today.   Return to clinic as soon as possible (same day) if you notice any new floaters, flashes of light, curtain/veil over your vision from any direction, or any change in vision.    4. Myopia with astigmatism and presbyopia, bilateral  Eyeglass Final Rx       Eyeglass Final Rx         Sphere Cylinder Axis Add    Right -1.75 +1.00 015 +2.50    Left -2.00 +0.50 148 +2.50      Type: PAL or SVL    Expiration Date: 6/11/2025                   Contact Lens Final Rx       Final Contact Lens Rx         Brand Base Curve Diameter Sphere Cylinder    Right no lens 8.4       Left Acuvue Angeli 8.4 14.0 -1.75 DS      Expiration Date: 6/11/2025    Replacement: Monthly    Solutions: OptiFree PureMoist    Wearing Schedule: Daily Wear                Monovision CTL fitted today. OD near OS distance.     Contact lens trials fitted in office today. Contact lens hygiene reviewed. Patient able to insert the lenses themselves with minimal difficulty. Patient ok to finalize Contact lens after 1 week of wear. RTC if still having difficulty with CTL trial after 1 week.      RTC 1 yr for dilated eye exam or sooner if any changes to vision.   Discussed above and answered questions.

## 2024-06-23 ENCOUNTER — PATIENT MESSAGE (OUTPATIENT)
Dept: OPTOMETRY | Facility: CLINIC | Age: 64
End: 2024-06-23
Payer: COMMERCIAL

## 2024-08-20 ENCOUNTER — TELEPHONE (OUTPATIENT)
Dept: INTERNAL MEDICINE | Facility: CLINIC | Age: 64
End: 2024-08-20
Payer: COMMERCIAL

## 2024-08-20 DIAGNOSIS — E11.69 HYPERLIPIDEMIA ASSOCIATED WITH TYPE 2 DIABETES MELLITUS: Chronic | ICD-10-CM

## 2024-08-20 DIAGNOSIS — E11.59 HYPERTENSION ASSOCIATED WITH DIABETES: Chronic | ICD-10-CM

## 2024-08-20 DIAGNOSIS — I15.2 HYPERTENSION ASSOCIATED WITH DIABETES: Chronic | ICD-10-CM

## 2024-08-20 DIAGNOSIS — E03.9 ACQUIRED HYPOTHYROIDISM: Chronic | ICD-10-CM

## 2024-08-20 DIAGNOSIS — Z12.31 ENCOUNTER FOR SCREENING MAMMOGRAM FOR MALIGNANT NEOPLASM OF BREAST: Primary | ICD-10-CM

## 2024-08-20 DIAGNOSIS — E11.65 UNCONTROLLED TYPE 2 DIABETES MELLITUS WITH HYPERGLYCEMIA, WITHOUT LONG-TERM CURRENT USE OF INSULIN: Chronic | ICD-10-CM

## 2024-08-20 DIAGNOSIS — E78.5 HYPERLIPIDEMIA ASSOCIATED WITH TYPE 2 DIABETES MELLITUS: Chronic | ICD-10-CM

## 2024-08-20 NOTE — TELEPHONE ENCOUNTER
----- Message from Juliana You sent at 8/20/2024  2:26 PM CDT -----  Contact: GLORIA TAMAYO [[id number]]  ..Type:  Patient Requesting Call    Who Called: GLORIA TAMAYO [[id number]]  Does the patient know what this is regarding?: pt is requesting mammo, lab orders and bone density  Would the patient rather a call back or a response via [facility]?  call  Best Call Back Number: .[phone] (home)   Additional Information:  Please schedule pt in [facility]

## 2024-08-20 NOTE — TELEPHONE ENCOUNTER
Pt would like to have annual labs & bone density test ordered to have done in [place] ./ALICIA RIDLEY placed order for mammo

## 2024-08-21 ENCOUNTER — TELEPHONE (OUTPATIENT)
Dept: INTERNAL MEDICINE | Facility: CLINIC | Age: 64
End: 2024-08-21
Payer: COMMERCIAL

## 2024-08-21 NOTE — TELEPHONE ENCOUNTER
Spoke with pt; MA informed pt mammo and labs were placed; MA scheduled both at location closer to pt as requested; pt also was requesting bone density but wasn't in an immediate rush /LD

## 2024-08-22 ENCOUNTER — TELEPHONE (OUTPATIENT)
Dept: INTERNAL MEDICINE | Facility: CLINIC | Age: 64
End: 2024-08-22
Payer: COMMERCIAL

## 2024-08-22 DIAGNOSIS — M85.89 OSTEOPENIA OF MULTIPLE SITES: Primary | ICD-10-CM

## 2024-08-22 NOTE — TELEPHONE ENCOUNTER
Bone density ordered   However, last imaging was July 2022   Her insurance may not cover it again until July 2025

## 2024-08-22 NOTE — TELEPHONE ENCOUNTER
----- Message from Sarah Piedra sent at 8/22/2024  3:57 PM CDT -----  Contact: Self [phone]  Would like to receive medical advice.    Would they like a call back or a response via [facility]:  call back    Additional information:  Calling to request an order for a bone density.

## 2024-08-23 ENCOUNTER — TELEPHONE (OUTPATIENT)
Dept: INTERNAL MEDICINE | Facility: CLINIC | Age: 64
End: 2024-08-23
Payer: COMMERCIAL

## 2024-08-23 NOTE — TELEPHONE ENCOUNTER
----- Message from Vanessa Laguerre sent at 8/22/2024  4:12 PM CDT -----  Regarding: order needed  Contact: pt  Type: Needs Medical Advice  Who Called:  patient  Symptoms (please be specific):    How long has patient had these symptoms:    Pharmacy name and phone #:    Best Call Back Number: [phone]    Additional Information: seeking a bone density order call once it has been put in thanks

## 2024-08-23 NOTE — TELEPHONE ENCOUNTER
----- Message from Shara Abarca sent at 8/23/2024 11:19 AM CDT -----  Regarding: Return call  Type:  Patient Returning Call    Who Called: Mary   Who Left Message for Patient:MelanyLala  Does the patient know what this is regarding?:Yes   Would the patient rather a call back or a response via [facility]? Call back   Best Call Back Number:[phone]  Additional Information:

## 2024-08-23 NOTE — TELEPHONE ENCOUNTER
Spoke with pt; MA informed her she wasn't due for bone density due to she just had it done and explained to her insurance might not cover the test; pt verbalized understanding /LD

## 2024-08-26 ENCOUNTER — PATIENT OUTREACH (OUTPATIENT)
Dept: ADMINISTRATIVE | Facility: HOSPITAL | Age: 64
End: 2024-08-26
Payer: COMMERCIAL

## 2024-09-16 ENCOUNTER — PATIENT OUTREACH (OUTPATIENT)
Dept: ADMINISTRATIVE | Facility: HOSPITAL | Age: 64
End: 2024-09-16
Payer: COMMERCIAL

## 2024-09-19 ENCOUNTER — LAB VISIT (OUTPATIENT)
Dept: LAB | Facility: HOSPITAL | Age: 64
End: 2024-09-19
Attending: FAMILY MEDICINE
Payer: COMMERCIAL

## 2024-09-19 DIAGNOSIS — I15.2 HYPERTENSION ASSOCIATED WITH DIABETES: Chronic | ICD-10-CM

## 2024-09-19 DIAGNOSIS — E11.59 HYPERTENSION ASSOCIATED WITH DIABETES: Chronic | ICD-10-CM

## 2024-09-19 DIAGNOSIS — E11.65 UNCONTROLLED TYPE 2 DIABETES MELLITUS WITH HYPERGLYCEMIA, WITHOUT LONG-TERM CURRENT USE OF INSULIN: Chronic | ICD-10-CM

## 2024-09-19 DIAGNOSIS — E78.5 HYPERLIPIDEMIA ASSOCIATED WITH TYPE 2 DIABETES MELLITUS: Chronic | ICD-10-CM

## 2024-09-19 DIAGNOSIS — E11.69 HYPERLIPIDEMIA ASSOCIATED WITH TYPE 2 DIABETES MELLITUS: Chronic | ICD-10-CM

## 2024-09-19 DIAGNOSIS — E03.9 ACQUIRED HYPOTHYROIDISM: Chronic | ICD-10-CM

## 2024-09-19 LAB
ESTIMATED AVG GLUCOSE: 266 MG/DL (ref 68–131)
HBA1C MFR BLD: 10.9 % (ref 4–5.6)

## 2024-09-19 PROCEDURE — 84443 ASSAY THYROID STIM HORMONE: CPT | Performed by: FAMILY MEDICINE

## 2024-09-19 PROCEDURE — 80061 LIPID PANEL: CPT | Performed by: FAMILY MEDICINE

## 2024-09-19 PROCEDURE — 80053 COMPREHEN METABOLIC PANEL: CPT | Performed by: FAMILY MEDICINE

## 2024-09-19 PROCEDURE — 84439 ASSAY OF FREE THYROXINE: CPT | Performed by: FAMILY MEDICINE

## 2024-09-19 PROCEDURE — 83036 HEMOGLOBIN GLYCOSYLATED A1C: CPT | Performed by: FAMILY MEDICINE

## 2024-09-20 LAB
ALBUMIN SERPL BCP-MCNC: 3.8 G/DL (ref 3.5–5.2)
ALP SERPL-CCNC: 60 U/L (ref 55–135)
ALT SERPL W/O P-5'-P-CCNC: 25 U/L (ref 10–44)
ANION GAP SERPL CALC-SCNC: 12 MMOL/L (ref 8–16)
AST SERPL-CCNC: 23 U/L (ref 10–40)
BILIRUB SERPL-MCNC: 0.6 MG/DL (ref 0.1–1)
BUN SERPL-MCNC: 14 MG/DL (ref 8–23)
CALCIUM SERPL-MCNC: 9.6 MG/DL (ref 8.7–10.5)
CHLORIDE SERPL-SCNC: 102 MMOL/L (ref 95–110)
CHOLEST SERPL-MCNC: 197 MG/DL (ref 120–199)
CHOLEST/HDLC SERPL: 5.1 {RATIO} (ref 2–5)
CO2 SERPL-SCNC: 22 MMOL/L (ref 23–29)
CREAT SERPL-MCNC: 1.3 MG/DL (ref 0.5–1.4)
EST. GFR  (NO RACE VARIABLE): 46.2 ML/MIN/1.73 M^2
GLUCOSE SERPL-MCNC: 302 MG/DL (ref 70–110)
HDLC SERPL-MCNC: 39 MG/DL (ref 40–75)
HDLC SERPL: 19.8 % (ref 20–50)
LDLC SERPL CALC-MCNC: 124.6 MG/DL (ref 63–159)
NONHDLC SERPL-MCNC: 158 MG/DL
POTASSIUM SERPL-SCNC: 4.4 MMOL/L (ref 3.5–5.1)
PROT SERPL-MCNC: 7.3 G/DL (ref 6–8.4)
SODIUM SERPL-SCNC: 136 MMOL/L (ref 136–145)
T4 FREE SERPL-MCNC: 0.85 NG/DL (ref 0.71–1.51)
TRIGL SERPL-MCNC: 167 MG/DL (ref 30–150)
TSH SERPL DL<=0.005 MIU/L-ACNC: 26.88 UIU/ML (ref 0.4–4)

## 2024-09-26 ENCOUNTER — OFFICE VISIT (OUTPATIENT)
Dept: INTERNAL MEDICINE | Facility: CLINIC | Age: 64
End: 2024-09-26
Payer: COMMERCIAL

## 2024-09-26 VITALS
WEIGHT: 213.88 LBS | HEART RATE: 83 BPM | SYSTOLIC BLOOD PRESSURE: 134 MMHG | OXYGEN SATURATION: 94 % | BODY MASS INDEX: 34.37 KG/M2 | HEIGHT: 66 IN | DIASTOLIC BLOOD PRESSURE: 82 MMHG

## 2024-09-26 DIAGNOSIS — E11.65 UNCONTROLLED TYPE 2 DIABETES MELLITUS WITH HYPERGLYCEMIA, WITHOUT LONG-TERM CURRENT USE OF INSULIN: ICD-10-CM

## 2024-09-26 DIAGNOSIS — E78.5 HYPERLIPIDEMIA ASSOCIATED WITH TYPE 2 DIABETES MELLITUS: ICD-10-CM

## 2024-09-26 DIAGNOSIS — E11.69 HYPERLIPIDEMIA ASSOCIATED WITH TYPE 2 DIABETES MELLITUS: ICD-10-CM

## 2024-09-26 DIAGNOSIS — M85.89 OSTEOPENIA OF MULTIPLE SITES: ICD-10-CM

## 2024-09-26 DIAGNOSIS — I15.2 HYPERTENSION ASSOCIATED WITH DIABETES: ICD-10-CM

## 2024-09-26 DIAGNOSIS — Z53.20 REFUSAL OF STATIN MEDICATION BY PATIENT: ICD-10-CM

## 2024-09-26 DIAGNOSIS — E11.59 HYPERTENSION ASSOCIATED WITH DIABETES: ICD-10-CM

## 2024-09-26 DIAGNOSIS — Z00.00 ANNUAL PHYSICAL EXAM: Primary | ICD-10-CM

## 2024-09-26 DIAGNOSIS — E03.9 ACQUIRED HYPOTHYROIDISM: ICD-10-CM

## 2024-09-26 PROCEDURE — 3008F BODY MASS INDEX DOCD: CPT | Mod: CPTII,S$GLB,, | Performed by: FAMILY MEDICINE

## 2024-09-26 PROCEDURE — 1160F RVW MEDS BY RX/DR IN RCRD: CPT | Mod: CPTII,S$GLB,, | Performed by: FAMILY MEDICINE

## 2024-09-26 PROCEDURE — 3046F HEMOGLOBIN A1C LEVEL >9.0%: CPT | Mod: CPTII,S$GLB,, | Performed by: FAMILY MEDICINE

## 2024-09-26 PROCEDURE — 3079F DIAST BP 80-89 MM HG: CPT | Mod: CPTII,S$GLB,, | Performed by: FAMILY MEDICINE

## 2024-09-26 PROCEDURE — 3075F SYST BP GE 130 - 139MM HG: CPT | Mod: CPTII,S$GLB,, | Performed by: FAMILY MEDICINE

## 2024-09-26 PROCEDURE — 99999 PR PBB SHADOW E&M-EST. PATIENT-LVL III: CPT | Mod: PBBFAC,,, | Performed by: FAMILY MEDICINE

## 2024-09-26 PROCEDURE — 3061F NEG MICROALBUMINURIA REV: CPT | Mod: CPTII,S$GLB,, | Performed by: FAMILY MEDICINE

## 2024-09-26 PROCEDURE — 1159F MED LIST DOCD IN RCRD: CPT | Mod: CPTII,S$GLB,, | Performed by: FAMILY MEDICINE

## 2024-09-26 PROCEDURE — 3066F NEPHROPATHY DOC TX: CPT | Mod: CPTII,S$GLB,, | Performed by: FAMILY MEDICINE

## 2024-09-26 PROCEDURE — 99396 PREV VISIT EST AGE 40-64: CPT | Mod: S$GLB,,, | Performed by: FAMILY MEDICINE

## 2024-09-26 RX ORDER — METFORMIN HYDROCHLORIDE 500 MG/1
500 TABLET ORAL 2 TIMES DAILY WITH MEALS
Qty: 60 TABLET | Refills: 5 | Status: SHIPPED | OUTPATIENT
Start: 2024-09-26

## 2024-09-26 RX ORDER — THYROID 60 MG/1
60 TABLET ORAL
Qty: 30 TABLET | Refills: 5 | Status: SHIPPED | OUTPATIENT
Start: 2024-09-26

## 2024-09-26 NOTE — PROGRESS NOTES
Subjective:   Patient ID: Mary Patterson is a 64 y.o. female.  Chief Complaint:  Annual Exam    Presents for annual physical exam     Labs done prior to today's visit showed:   Glucose 302 and A1c 10.9%.  Diabetes uncontrolled.    .  Not at goal less than 100.    EGFR 46.2 and stable chronic kidney disease stage 3a range  TSH level significantly elevated.  Free T4 level low normal.  Current thyroid supplement not adequate.    Medical history:   -Diabetes.  Uncontrolled.  A1c greater than 10%.  Noncompliance with Jardiance 25 mg daily and Januvia 100 mg daily.  Eye exam, foot exam, microalbumin up-to-date.    -hyperlipidemia.  Uncontrolled.  LDL not at goal.  Noncompliance with Crestor 5 mg daily.  -hypothyroidism.  Uncontrolled.  Noncompliant with levothyroxine 175 mcg daily.  -hypertension.  No medication.  -osteopenia.  Low fracture risk.  No medication indicated.  Due for repeat bone density July 2025    Colon cancer screening up-to-date with Cologuard test till May 20, 2026   Mammogram scheduled   Tetanus booster up-to-date   Shingles vaccine series up-to-date   Pneumonia vaccine up-to-date   Needs RSV vaccine   Needs COVID booster   Needs flu vaccine      Review of Systems   Constitutional:  Negative for chills, diaphoresis, fatigue and fever.   HENT:  Negative for trouble swallowing.    Eyes:  Negative for visual disturbance.   Respiratory:  Negative for cough, chest tightness, shortness of breath and wheezing.    Cardiovascular:  Negative for chest pain, palpitations and leg swelling.   Gastrointestinal:  Negative for abdominal distention, abdominal pain, constipation, diarrhea, nausea and vomiting.   Endocrine: Negative for cold intolerance, heat intolerance, polydipsia, polyphagia and polyuria.   Genitourinary:  Negative for difficulty urinating, dysuria, flank pain, frequency, hematuria, menstrual problem and urgency.   Musculoskeletal:  Negative for myalgias.   Skin:  Negative for rash.  "  Neurological:  Negative for dizziness, tremors, syncope, weakness, light-headedness, numbness and headaches.   Psychiatric/Behavioral:  Negative for sleep disturbance. The patient is not nervous/anxious.        Objective:   /82 (BP Location: Left arm, BP Method: Large (Manual))   Pulse 83   Ht 5' 5.5" (1.664 m)   Wt 97 kg (213 lb 13.5 oz)   LMP  (LMP Unknown)   SpO2 (!) 94%   BMI 35.04 kg/m²     Physical Exam  Vitals and nursing note reviewed.   Constitutional:       Appearance: Normal appearance. She is well-developed. She is obese.   Eyes:      General: No scleral icterus.     Conjunctiva/sclera: Conjunctivae normal.      Right eye: Right conjunctiva is not injected.      Left eye: Left conjunctiva is not injected.   Neck:      Thyroid: No thyroid mass, thyromegaly or thyroid tenderness.      Vascular: No JVD.   Cardiovascular:      Rate and Rhythm: Normal rate and regular rhythm.      Heart sounds: Normal heart sounds.   Pulmonary:      Effort: Pulmonary effort is normal.      Breath sounds: Normal breath sounds.   Abdominal:      General: There is no distension.      Palpations: Abdomen is soft. There is no hepatomegaly.      Tenderness: There is no abdominal tenderness. There is no guarding or rebound.   Musculoskeletal:      Right lower leg: No edema.      Left lower leg: No edema.   Skin:     Findings: No rash.   Neurological:      Mental Status: She is alert.   Psychiatric:         Mood and Affect: Mood and affect normal.         Assessment:       ICD-10-CM ICD-9-CM   1. Annual physical exam  Z00.00 V70.0   2. Uncontrolled type 2 diabetes mellitus with hyperglycemia, without long-term current use of insulin  E11.65 250.02   3. Hyperlipidemia associated with type 2 diabetes mellitus  E11.69 250.80    E78.5 272.4   4. Refusal of statin medication by patient  Z53.20 V64.2   5. Acquired hypothyroidism  E03.9 244.9   6. Hypertension associated with diabetes  E11.59 250.80    I15.2 401.9   7. " Osteopenia of multiple sites  M85.89 733.90     Plan:   Annual physical exam  Repeat blood pressure normal.  BMI 35.  Overall exam stable.    Reviewed labs done prior to today's visit   Colon cancer screening up-to-date   Mammogram scheduled   Declines RSV COVID vaccines     Uncontrolled type 2 diabetes mellitus with hyperglycemia, without long-term current use of insulin  -     metFORMIN (GLUCOPHAGE) 500 MG tablet; Take 1 tablet (500 mg total) by mouth 2 (two) times daily with meals.  Dispense: 60 tablet; Refill: 5  -     SITagliptin phosphate (JANUVIA) 50 MG Tab; Take 1 tablet (50 mg total) by mouth once daily.  Dispense: 30 tablet; Refill: 5  -     Hemoglobin A1C; Future; Expected date: 09/26/2024  Uncontrolled   Noncompliant   Lengthy discussion about risk controlled  Agrees to take metformin 500 mg twice a day and Januvia 50 mg daily   Recheck A1c 3 months   Eye exam, foot exam up-to-date    Hyperlipidemia associated with type 2 diabetes mellitus  Refusal of statin medication by patient  Asymptomatic   LDL not at goal   Discussed statin indication   Discussed risk of heart attack stroke   Still continues to refuse/declines treatment with statin     Acquired hypothyroidism  -     thyroid, pork, (ARMOUR THYROID) 60 mg Tab; Take 1 tablet (60 mg total) by mouth before breakfast.  Dispense: 30 tablet; Refill: 5  -     TSH; Future; Expected date: 09/26/2024  -     T4, Free; Future; Expected date: 09/26/2024  Uncontrolled   Noncompliant   Agrees to try Synthroid 60 mg a day  Recheck TSH and free T4-3 months     Hypertension associated with diabetes  Controlled rate stable.  Asymptomatic.  BP at goal.    Okay remain off medication     Osteopenia of multiple sites  Osteopenia.  Ten year major osteoporotic fracture risk less than 20%.    Ten year hip fracture risk less than 3%  Needs daily vitamin-D and calcium supplementation.    No additional medication needed    Return to clinic 3 months

## 2024-10-03 ENCOUNTER — PATIENT OUTREACH (OUTPATIENT)
Dept: ADMINISTRATIVE | Facility: HOSPITAL | Age: 64
End: 2024-10-03
Payer: COMMERCIAL

## 2024-10-03 NOTE — PROGRESS NOTES
Pt returned call, pt had Cologuard done 5.10.23, due 5.10.26, order placed by Dr Carlson on 10.13.23 was voided by [employer].   topics up to date.

## 2024-10-07 ENCOUNTER — APPOINTMENT (OUTPATIENT)
Dept: RADIOLOGY | Facility: HOSPITAL | Age: 64
End: 2024-10-07
Attending: FAMILY MEDICINE
Payer: COMMERCIAL

## 2024-10-07 DIAGNOSIS — M85.89 OSTEOPENIA OF MULTIPLE SITES: ICD-10-CM

## 2024-10-07 PROCEDURE — 77080 DXA BONE DENSITY AXIAL: CPT | Mod: 26,,, | Performed by: STUDENT IN AN ORGANIZED HEALTH CARE EDUCATION/TRAINING PROGRAM

## 2024-10-07 PROCEDURE — 77080 DXA BONE DENSITY AXIAL: CPT | Mod: TC

## 2024-10-14 ENCOUNTER — HOSPITAL ENCOUNTER (OUTPATIENT)
Dept: RADIOLOGY | Facility: HOSPITAL | Age: 64
Discharge: HOME | End: 2024-10-14
Attending: FAMILY MEDICINE
Payer: COMMERCIAL

## 2024-10-14 DIAGNOSIS — Z12.31 ENCOUNTER FOR SCREENING MAMMOGRAM FOR MALIGNANT NEOPLASM OF BREAST: ICD-10-CM

## 2024-10-14 PROCEDURE — 77063 BREAST TOMOSYNTHESIS BI: CPT | Mod: 26,,, | Performed by: RADIOLOGY

## 2024-10-14 PROCEDURE — 77067 SCR MAMMO BI INCL CAD: CPT | Mod: 26,,, | Performed by: RADIOLOGY

## 2024-10-14 PROCEDURE — 77067 SCR MAMMO BI INCL CAD: CPT | Mod: TC,PO

## 2024-10-14 PROCEDURE — 77063 BREAST TOMOSYNTHESIS BI: CPT | Mod: TC,PO

## 2024-12-20 ENCOUNTER — LAB VISIT (OUTPATIENT)
Dept: LAB | Facility: HOSPITAL | Age: 64
End: 2024-12-20
Attending: FAMILY MEDICINE
Payer: COMMERCIAL

## 2024-12-20 DIAGNOSIS — E03.9 ACQUIRED HYPOTHYROIDISM: ICD-10-CM

## 2024-12-20 DIAGNOSIS — E11.65 UNCONTROLLED TYPE 2 DIABETES MELLITUS WITH HYPERGLYCEMIA, WITHOUT LONG-TERM CURRENT USE OF INSULIN: ICD-10-CM

## 2024-12-20 LAB
ESTIMATED AVG GLUCOSE: 237 MG/DL (ref 68–131)
HBA1C MFR BLD: 9.9 % (ref 4–5.6)

## 2024-12-20 PROCEDURE — 84443 ASSAY THYROID STIM HORMONE: CPT | Performed by: FAMILY MEDICINE

## 2024-12-20 PROCEDURE — 84439 ASSAY OF FREE THYROXINE: CPT | Performed by: FAMILY MEDICINE

## 2024-12-20 PROCEDURE — 36415 COLL VENOUS BLD VENIPUNCTURE: CPT | Mod: PO | Performed by: FAMILY MEDICINE

## 2024-12-20 PROCEDURE — 83036 HEMOGLOBIN GLYCOSYLATED A1C: CPT | Performed by: FAMILY MEDICINE

## 2024-12-21 LAB
T4 FREE SERPL-MCNC: 0.51 NG/DL (ref 0.71–1.51)
TSH SERPL DL<=0.005 MIU/L-ACNC: 54.93 UIU/ML (ref 0.4–4)

## 2024-12-30 ENCOUNTER — OFFICE VISIT (OUTPATIENT)
Dept: INTERNAL MEDICINE | Facility: CLINIC | Age: 64
End: 2024-12-30
Payer: COMMERCIAL

## 2024-12-30 VITALS
OXYGEN SATURATION: 97 % | HEART RATE: 84 BPM | HEIGHT: 66 IN | WEIGHT: 218.69 LBS | SYSTOLIC BLOOD PRESSURE: 128 MMHG | DIASTOLIC BLOOD PRESSURE: 70 MMHG | BODY MASS INDEX: 35.15 KG/M2

## 2024-12-30 DIAGNOSIS — E11.59 HYPERTENSION ASSOCIATED WITH DIABETES: ICD-10-CM

## 2024-12-30 DIAGNOSIS — E11.65 UNCONTROLLED TYPE 2 DIABETES MELLITUS WITH HYPERGLYCEMIA, WITHOUT LONG-TERM CURRENT USE OF INSULIN: Primary | ICD-10-CM

## 2024-12-30 DIAGNOSIS — Z23 NEED FOR INFLUENZA VACCINATION: ICD-10-CM

## 2024-12-30 DIAGNOSIS — E03.9 ACQUIRED HYPOTHYROIDISM: ICD-10-CM

## 2024-12-30 DIAGNOSIS — E66.01 SEVERE OBESITY (BMI 35.0-39.9) WITH COMORBIDITY: ICD-10-CM

## 2024-12-30 DIAGNOSIS — I15.2 HYPERTENSION ASSOCIATED WITH DIABETES: ICD-10-CM

## 2024-12-30 DIAGNOSIS — N18.31 CHRONIC KIDNEY DISEASE, STAGE 3A: ICD-10-CM

## 2024-12-30 PROBLEM — Z53.20 REFUSAL OF STATIN MEDICATION BY PATIENT: Status: ACTIVE | Noted: 2024-12-30

## 2024-12-30 PROBLEM — Z91.148 NON COMPLIANCE W MEDICATION REGIMEN: Chronic | Status: ACTIVE | Noted: 2022-10-25

## 2024-12-30 PROCEDURE — 90656 IIV3 VACC NO PRSV 0.5 ML IM: CPT | Mod: S$GLB,,, | Performed by: FAMILY MEDICINE

## 2024-12-30 PROCEDURE — 1159F MED LIST DOCD IN RCRD: CPT | Mod: CPTII,S$GLB,, | Performed by: FAMILY MEDICINE

## 2024-12-30 PROCEDURE — 3074F SYST BP LT 130 MM HG: CPT | Mod: CPTII,S$GLB,, | Performed by: FAMILY MEDICINE

## 2024-12-30 PROCEDURE — 99999 PR PBB SHADOW E&M-EST. PATIENT-LVL III: CPT | Mod: PBBFAC,,, | Performed by: FAMILY MEDICINE

## 2024-12-30 PROCEDURE — 3066F NEPHROPATHY DOC TX: CPT | Mod: CPTII,S$GLB,, | Performed by: FAMILY MEDICINE

## 2024-12-30 PROCEDURE — 3046F HEMOGLOBIN A1C LEVEL >9.0%: CPT | Mod: CPTII,S$GLB,, | Performed by: FAMILY MEDICINE

## 2024-12-30 PROCEDURE — 3008F BODY MASS INDEX DOCD: CPT | Mod: CPTII,S$GLB,, | Performed by: FAMILY MEDICINE

## 2024-12-30 PROCEDURE — 3061F NEG MICROALBUMINURIA REV: CPT | Mod: CPTII,S$GLB,, | Performed by: FAMILY MEDICINE

## 2024-12-30 PROCEDURE — 99214 OFFICE O/P EST MOD 30 MIN: CPT | Mod: 25,S$GLB,, | Performed by: FAMILY MEDICINE

## 2024-12-30 PROCEDURE — 90471 IMMUNIZATION ADMIN: CPT | Mod: S$GLB,,, | Performed by: FAMILY MEDICINE

## 2024-12-30 PROCEDURE — 3078F DIAST BP <80 MM HG: CPT | Mod: CPTII,S$GLB,, | Performed by: FAMILY MEDICINE

## 2024-12-30 RX ORDER — THYROID 90 MG/1
90 TABLET ORAL
Qty: 90 TABLET | Refills: 0 | Status: SHIPPED | OUTPATIENT
Start: 2024-12-30 | End: 2025-03-30

## 2024-12-30 NOTE — PROGRESS NOTES
Subjective:   Patient ID: Mary Patterson is a 64 y.o. female.  Chief Complaint:  Follow-up    Presents for three-month follow-up     - Uncontrolled type 2 diabetes mellitus with hyperglycemia, without long-term current use of insulin  Last visit lengthy discussion about uncontrolled diabetes and increase health risk   Agreed at that time take metformin 500 mg twice a day and Januvia 50 mg daily  Labs done prior to today's visit shows diabetes remains uncontrolled with A1c 9.9%   Only taking metformin 500 mg daily due to GI upset if does not eat  Did not start Januvia, unclear why  Eye exam, foot exam, microalbumin all up-to-date    - Acquired hypothyroidism  -     thyroid, pork, (ARMOUR THYROID) 60 mg Tab; Take 1 tablet (60 mg total) by mouth before breakfast.  Dispense: 30 tablet; Refill: 5  -     TSH; Future; Expected date: 09/26/2024  -     T4, Free; Future; Expected date: 09/26/2024  Uncontrolled. Noncompliant   Agreed to that visit to take [place] thyroid 60 mg a day  Reports compliance.  Denies side effects.    Has noted improvement in energy levels.  Labs done prior to today's visit shows hypothyroidism remains uncontrolled with TSH 55 and free T4 0.51    - Hyperlipidemia associated with type 2 diabetes mellitus  - Refusal of statin medication by patient  Asymptomatic   LDL not at goal   Discussed statin indication   Discussed risk of heart attack stroke   Continued to refuse/declines treatment with statin     - Hypertension  Controlled off medication   Calculated EGFR decreased x2 in chronic kidney disease stage IIIA range   Creatinine levels normal   Did not start Ace/Arb    Would like flu vaccine today     No additional complaints today        Current Outpatient Medications:     metFORMIN (GLUCOPHAGE) 500 MG tablet, Take 1 tablet (500 mg total) by mouth 2 (two) times daily with meals., Disp: 60 tablet, Rfl: 5    SITagliptin phosphate (JANUVIA) 100 MG Tab, Take 1 tablet (100 mg total) by mouth once daily.,  "Disp: 90 tablet, Rfl: 3    thyroid, pork, (ARMOUR THYROID) 90 mg Tab, Take 1 tablet (90 mg total) by mouth before breakfast., Disp: 90 tablet, Rfl: 0    Current Facility-Administered Medications:     influenza (Flulaval, Fluzone, Fluarix) 45 mcg/0.5 mL IM vaccine (> or = 6 mo) 0.5 mL, 0.5 mL, Intramuscular, 1 time in Clinic/HOD,     Review of Systems   Constitutional:  Negative for diaphoresis, fatigue and fever.   Eyes:  Negative for visual disturbance.   Respiratory:  Negative for shortness of breath.    Cardiovascular:  Negative for chest pain and leg swelling.   Gastrointestinal:  Negative for abdominal pain, constipation, diarrhea, nausea and vomiting.   Endocrine: Negative for polydipsia, polyphagia and polyuria.   Genitourinary:  Negative for difficulty urinating, dysuria, flank pain, frequency, hematuria and urgency.   Musculoskeletal:  Negative for myalgias.   Skin:  Negative for rash.   Neurological:  Negative for dizziness, weakness, light-headedness, numbness and headaches.     Objective:   /70 (BP Location: Right arm, Patient Position: Sitting)   Pulse 84   Ht 5' 5.5" (1.664 m)   Wt 99.2 kg (218 lb 11.1 oz)   LMP  (LMP Unknown)   SpO2 97%   BMI 35.84 kg/m²     Physical Exam  Vitals and nursing note reviewed.   Constitutional:       Appearance: Normal appearance. She is well-developed. She is obese.   Cardiovascular:      Rate and Rhythm: Normal rate and regular rhythm.   Pulmonary:      Effort: Pulmonary effort is normal.   Musculoskeletal:      Right lower leg: No edema.      Left lower leg: No edema.   Skin:     Findings: No rash.   Neurological:      Mental Status: She is alert.   Psychiatric:         Mood and Affect: Mood and affect normal.       Assessment:       ICD-10-CM ICD-9-CM   1. Uncontrolled type 2 diabetes mellitus with hyperglycemia, without long-term current use of insulin  E11.65 250.02   2. Severe obesity (BMI 35.0-39.9) with comorbidity  E66.01 278.01   3. Acquired " hypothyroidism  E03.9 244.9   4. Need for influenza vaccination  Z23 V04.81   5. Hypertension associated with diabetes  E11.59 250.80    I15.2 401.9   6. Chronic kidney disease, stage 3a  N18.31 585.3   7. Hyperlipidemia associated with type 2 diabetes mellitus  E11.69 250.80    E78.5 272.4   8. Refusal of statin medication by patient  Z53.20 V64.2     Plan:   Uncontrolled type 2 diabetes mellitus with hyperglycemia, without long-term current use of insulin  Severe obesity (BMI 35.0-39.9) with comorbidity  -     Hemoglobin A1C; Future; Expected date: 03/24/2025  -     SITagliptin phosphate (JANUVIA) 100 MG Tab; Take 1 tablet (100 mg total) by mouth once daily.  Dispense: 90 tablet; Refill: 3  Improved but remains uncontrolled   Discuss treatment with GLP1 to help with weight loss, declines medication due to intolerance in the past   Discussed SGLT2 treatment with possible underlying chronic kidney disease, but concerned caused increased bladder infections in the past  Continue metformin 500 mg daily   Start Januvia 100 mg daily  Recheck A1c 3 months   Eye exam, foot exam, microalbumin up-to-date    Acquired hypothyroidism  -     thyroid, pork, (ARMOUR THYROID) 90 mg Tab; Take 1 tablet (90 mg total) by mouth before breakfast.  Dispense: 90 tablet; Refill: 0  -     TSH; Future; Expected date: 03/24/2025  -     T4, Free; Future; Expected date: 03/24/2025  Improved but remains uncontrolled   Increase [place] thyroid 90 mg daily   Recheck TSH and free T4-3 months     Hypertension associated with diabetes  Controlled.  Stable.  Asymptomatic.  BP at goal.    Okay remain off medication     Chronic kidney disease, stage 3a  -     Renal Function Panel; Future; Expected date: 03/24/2025  Based on decreased calculated EGFR   Creatinine normal   Microalbumin normal   Possible related to dehydration and elevated glucose and no real underlying chronic kidney disease   Declines treatment with ACE-inhibitor/Arb or SGLT2 at this time    Recheck renal function panel 3 months     Need for influenza vaccination  -     influenza (Flulaval, Fluzone, Fluarix) 45 mcg/0.5 mL IM vaccine (> or = 6 mo) 0.5 mL    Return to clinic 3 months or sooner as needed     Visit today included increased complexity associated with managing the longitudinal care of the patient due to the serious and/or complex managed problem(s) listed above.

## 2025-04-03 ENCOUNTER — PATIENT OUTREACH (OUTPATIENT)
Dept: ADMINISTRATIVE | Facility: HOSPITAL | Age: 65
End: 2025-04-03
Payer: COMMERCIAL

## 2025-04-09 ENCOUNTER — PATIENT OUTREACH (OUTPATIENT)
Dept: ADMINISTRATIVE | Facility: HOSPITAL | Age: 65
End: 2025-04-09
Payer: COMMERCIAL

## 2025-04-10 ENCOUNTER — LAB VISIT (OUTPATIENT)
Dept: LAB | Facility: HOSPITAL | Age: 65
End: 2025-04-10
Attending: FAMILY MEDICINE
Payer: COMMERCIAL

## 2025-04-10 DIAGNOSIS — E11.65 UNCONTROLLED TYPE 2 DIABETES MELLITUS WITH HYPERGLYCEMIA, WITHOUT LONG-TERM CURRENT USE OF INSULIN: ICD-10-CM

## 2025-04-10 DIAGNOSIS — E03.9 ACQUIRED HYPOTHYROIDISM: ICD-10-CM

## 2025-04-10 LAB
EAG (OHS): 260 MG/DL (ref 68–131)
HBA1C MFR BLD: 10.7 % (ref 4–5.6)
T4 FREE SERPL-MCNC: 0.62 NG/DL (ref 0.71–1.51)
TSH SERPL-ACNC: 60.31 UIU/ML (ref 0.4–4)

## 2025-04-10 PROCEDURE — 83036 HEMOGLOBIN GLYCOSYLATED A1C: CPT

## 2025-04-10 PROCEDURE — 84443 ASSAY THYROID STIM HORMONE: CPT

## 2025-04-10 PROCEDURE — 36415 COLL VENOUS BLD VENIPUNCTURE: CPT | Mod: PO

## 2025-04-10 PROCEDURE — 84439 ASSAY OF FREE THYROXINE: CPT

## 2025-04-11 ENCOUNTER — RESULTS FOLLOW-UP (OUTPATIENT)
Dept: INTERNAL MEDICINE | Facility: CLINIC | Age: 65
End: 2025-04-11

## 2025-04-15 ENCOUNTER — OFFICE VISIT (OUTPATIENT)
Dept: INTERNAL MEDICINE | Facility: CLINIC | Age: 65
End: 2025-04-15
Payer: COMMERCIAL

## 2025-04-15 VITALS
DIASTOLIC BLOOD PRESSURE: 90 MMHG | BODY MASS INDEX: 34.93 KG/M2 | HEIGHT: 66 IN | OXYGEN SATURATION: 97 % | WEIGHT: 217.38 LBS | SYSTOLIC BLOOD PRESSURE: 142 MMHG | HEART RATE: 86 BPM

## 2025-04-15 DIAGNOSIS — N18.31 CHRONIC KIDNEY DISEASE, STAGE 3A: Chronic | ICD-10-CM

## 2025-04-15 DIAGNOSIS — E11.59 HYPERTENSION ASSOCIATED WITH DIABETES: ICD-10-CM

## 2025-04-15 DIAGNOSIS — T38.3X5S ADVERSE EFFECT OF METFORMIN, SEQUELA: ICD-10-CM

## 2025-04-15 DIAGNOSIS — E03.9 ACQUIRED HYPOTHYROIDISM: ICD-10-CM

## 2025-04-15 DIAGNOSIS — R07.9 CHEST PAIN, UNSPECIFIED TYPE: ICD-10-CM

## 2025-04-15 DIAGNOSIS — I15.2 HYPERTENSION ASSOCIATED WITH DIABETES: ICD-10-CM

## 2025-04-15 DIAGNOSIS — E11.65 UNCONTROLLED TYPE 2 DIABETES MELLITUS WITH HYPERGLYCEMIA, WITHOUT LONG-TERM CURRENT USE OF INSULIN: Primary | ICD-10-CM

## 2025-04-15 PROBLEM — T38.3X5A METFORMIN ADVERSE REACTION: Status: ACTIVE | Noted: 2025-04-15

## 2025-04-15 PROCEDURE — 99999 PR PBB SHADOW E&M-EST. PATIENT-LVL III: CPT | Mod: PBBFAC,,, | Performed by: FAMILY MEDICINE

## 2025-04-15 PROCEDURE — 3008F BODY MASS INDEX DOCD: CPT | Mod: CPTII,S$GLB,, | Performed by: FAMILY MEDICINE

## 2025-04-15 PROCEDURE — 3080F DIAST BP >= 90 MM HG: CPT | Mod: CPTII,S$GLB,, | Performed by: FAMILY MEDICINE

## 2025-04-15 PROCEDURE — G2211 COMPLEX E/M VISIT ADD ON: HCPCS | Mod: S$GLB,,, | Performed by: FAMILY MEDICINE

## 2025-04-15 PROCEDURE — 3046F HEMOGLOBIN A1C LEVEL >9.0%: CPT | Mod: CPTII,S$GLB,, | Performed by: FAMILY MEDICINE

## 2025-04-15 PROCEDURE — 3077F SYST BP >= 140 MM HG: CPT | Mod: CPTII,S$GLB,, | Performed by: FAMILY MEDICINE

## 2025-04-15 PROCEDURE — 1159F MED LIST DOCD IN RCRD: CPT | Mod: CPTII,S$GLB,, | Performed by: FAMILY MEDICINE

## 2025-04-15 PROCEDURE — 3072F LOW RISK FOR RETINOPATHY: CPT | Mod: CPTII,S$GLB,, | Performed by: FAMILY MEDICINE

## 2025-04-15 PROCEDURE — 99215 OFFICE O/P EST HI 40 MIN: CPT | Mod: S$GLB,,, | Performed by: FAMILY MEDICINE

## 2025-04-15 RX ORDER — THYROID 120 MG/1
120 TABLET ORAL
Qty: 90 TABLET | Refills: 0 | Status: SHIPPED | OUTPATIENT
Start: 2025-04-15 | End: 2025-07-14

## 2025-04-15 NOTE — PROGRESS NOTES
Subjective:   Patient ID: Mary Patterson is a 65 y.o. female.  Chief Complaint:  chest discomfort    Presents for three-month follow-up  Daughter present in room throughout exam    Labs done prior to today's visit showed:   A1c 10.7%.  Diabetes remains uncontrolled.    TSH significantly elevated and free T4 remains low.  Hypothyroidism not adequately controlled.     - Uncontrolled type 2 diabetes mellitus with hyperglycemia, without long-term current use of insulin  - Severe obesity (BMI 35.0-39.9) with comorbidity  Uncontrolled last visit   Continued metformin 500 mg daily   Agreed to start Januvia 100 mg daily   Today reports did not change diabetes regimen and only taking metformin  Unfortunately A1c 10.7% and remains uncontrolled.  Previously discussed treatment with GLP1 to help with weight loss, declines medication due to intolerance in the past   Discussed SGLT2 treatment with possible underlying chronic kidney disease, but concerned caused increased bladder infections in the past  Eye exam, foot exam, microalbumin up-to-date     - Acquired hypothyroidism  Uncontrolled last visit   Requested change from levothyroxine to [place] thyroid   Started at comparable Increased [place] thyroid 90 mg daily dose   Reports compliance.  Denies side effects.  Unfortunately TSH remains elevated and T4 remains low     - Hypertension associated with diabetes  Controlled last visit off medication    - Chronic kidney disease, stage 3a  Based on decreased calculated EGFR   Creatinine normal   Microalbumin normal   Possible related to dehydration and elevated glucose and no real underlying chronic kidney disease   Declines treatment with ACE-inhibitor/Arb or SGLT2 at this time   Advised to recheck renal function panel in 3 months, but lab not done prior to today's visit.    New complaints today include chest pain, substernal, radiates to arm, brief, intermittent, no nausea or diaphoresis    Review of Systems   Constitutional:   "Negative for chills, diaphoresis, fatigue and fever.   HENT:  Negative for trouble swallowing.    Eyes:  Negative for visual disturbance.   Respiratory:  Negative for cough, chest tightness, shortness of breath and wheezing.    Cardiovascular:  Positive for chest pain. Negative for palpitations and leg swelling.   Gastrointestinal:  Negative for abdominal distention, abdominal pain, constipation, diarrhea, nausea and vomiting.   Endocrine: Negative for cold intolerance, heat intolerance, polydipsia, polyphagia and polyuria.   Genitourinary:  Negative for difficulty urinating, dysuria, flank pain, frequency, hematuria, menstrual problem and urgency.   Musculoskeletal:  Negative for myalgias.   Skin:  Negative for rash.   Neurological:  Negative for dizziness, tremors, syncope, weakness, light-headedness, numbness and headaches.   Psychiatric/Behavioral:  Negative for sleep disturbance. The patient is not nervous/anxious.        Objective:   BP (!) 142/90 (BP Location: Right arm, Patient Position: Sitting)   Pulse 86   Ht 5' 5.5" (1.664 m)   Wt 98.6 kg (217 lb 6 oz)   LMP  (LMP Unknown)   SpO2 97%   BMI 35.62 kg/m²     Physical Exam  Vitals and nursing note reviewed.   Constitutional:       Appearance: Normal appearance. She is well-developed. She is obese.      Comments:   Blood pressure mildly elevated   Cardiovascular:      Rate and Rhythm: Normal rate and regular rhythm.   Pulmonary:      Effort: Pulmonary effort is normal.   Musculoskeletal:      Right lower leg: No edema.      Left lower leg: No edema.   Skin:     Findings: No rash.   Neurological:      Mental Status: She is alert.   Psychiatric:         Mood and Affect: Mood and affect normal.       Assessment:       ICD-10-CM ICD-9-CM   1. Uncontrolled type 2 diabetes mellitus with hyperglycemia, without long-term current use of insulin  E11.65 250.02   2. Chronic kidney disease, stage 3a  N18.31 585.3   3. Adverse effect of metformin, sequela  T38.3X5S " 909.5     E932.3   4. Acquired hypothyroidism  E03.9 244.9   5. Hypertension associated with diabetes  E11.59 250.80    I15.2 401.9   6. Chest pain, unspecified type  R07.9 786.50     Plan:   Uncontrolled type 2 diabetes mellitus with hyperglycemia, without long-term current use of insulin  Chronic kidney disease, stage 3a  Adverse effect of metformin, sequela  -     empagliflozin (JARDIANCE) 25 mg tablet; Take 1 tablet (25 mg total) by mouth once daily.  Dispense: 30 tablet; Refill: 2  -     SITagliptin phosphate (JANUVIA) 100 MG Tab; Take 1 tablet (100 mg total) by mouth once daily.  Dispense: 30 tablet; Refill: 2  Uncontrolled  Noncompliant   Discontinue metformin   Patient does not want to start insulin   Patient does not want to start [place] P1 injections  Start Januvia 100 mg daily   Previous recurrent UTIs were most likely related to her uncontrolled diabetes   Start Jardiance 25 mg daily and discussed if recurrent UTI  can discontinue medication  Recheck A1c and renal function panel in 3 months      Acquired hypothyroidism  -     thyroid, pork, (ARMOUR THYROID) 120 mg Tab; Take 1 tablet (120 mg total) by mouth before breakfast.  Dispense: 90 tablet; Refill: 0  -     TSH; Future; Expected date: 04/15/2025  Increase [place] thyroid 120 mg daily   Recheck TSH 6-8 weeks     Hypertension associated with diabetes  Borderline control   Discussed starting ACE inhibitor or Arb with additional benefits of kidney protection  Patient declines at this time    Chest pain, unspecified type  -     Ambulatory referral/consult to Cardiology; Future; Expected date: 04/22/2025  Based on multiple risk factors and uncontrolled current medical conditions recommend additional coronary artery evaluation   Cardiology referral ordered    Return to clinic 3 months or sooner as needed    40 minutes of total time spent on the encounter, which includes face to face time and non-face to face time preparing to see the patient (eg, review of  tests), Obtaining and/or reviewing separately obtained history, documenting clinical information in the electronic or other health record, independently interpreting results (not separately reported) and communicating results to the patient/family/caregiver, or Care coordination (not separately reported)

## 2025-04-30 DIAGNOSIS — E11.59 HYPERTENSION ASSOCIATED WITH DIABETES: Primary | Chronic | ICD-10-CM

## 2025-04-30 DIAGNOSIS — I15.2 HYPERTENSION ASSOCIATED WITH DIABETES: Primary | Chronic | ICD-10-CM

## 2025-04-30 DIAGNOSIS — Z00.00 ROUTINE HEALTH MAINTENANCE: ICD-10-CM

## 2025-05-22 ENCOUNTER — OFFICE VISIT (OUTPATIENT)
Dept: CARDIOLOGY | Facility: CLINIC | Age: 65
End: 2025-05-22
Payer: COMMERCIAL

## 2025-05-22 ENCOUNTER — HOSPITAL ENCOUNTER (OUTPATIENT)
Dept: CARDIOLOGY | Facility: HOSPITAL | Age: 65
Discharge: HOME | End: 2025-05-22
Attending: INTERNAL MEDICINE
Payer: COMMERCIAL

## 2025-05-22 VITALS
BODY MASS INDEX: 33.66 KG/M2 | HEART RATE: 79 BPM | HEIGHT: 66 IN | OXYGEN SATURATION: 97 % | WEIGHT: 209.44 LBS | SYSTOLIC BLOOD PRESSURE: 115 MMHG | DIASTOLIC BLOOD PRESSURE: 78 MMHG

## 2025-05-22 DIAGNOSIS — E11.65 UNCONTROLLED TYPE 2 DIABETES MELLITUS WITH HYPERGLYCEMIA, WITHOUT LONG-TERM CURRENT USE OF INSULIN: Chronic | ICD-10-CM

## 2025-05-22 DIAGNOSIS — R07.9 CHEST PAIN, UNSPECIFIED TYPE: Primary | ICD-10-CM

## 2025-05-22 DIAGNOSIS — E11.59 HYPERTENSION ASSOCIATED WITH DIABETES: Chronic | ICD-10-CM

## 2025-05-22 DIAGNOSIS — E66.811 CLASS 1 OBESITY DUE TO EXCESS CALORIES WITHOUT SERIOUS COMORBIDITY WITH BODY MASS INDEX (BMI) OF 34.0 TO 34.9 IN ADULT: ICD-10-CM

## 2025-05-22 DIAGNOSIS — E11.69 HYPERLIPIDEMIA ASSOCIATED WITH TYPE 2 DIABETES MELLITUS: Chronic | ICD-10-CM

## 2025-05-22 DIAGNOSIS — I15.2 HYPERTENSION ASSOCIATED WITH DIABETES: Chronic | ICD-10-CM

## 2025-05-22 DIAGNOSIS — E66.01 SEVERE OBESITY (BMI 35.0-39.9) WITH COMORBIDITY: ICD-10-CM

## 2025-05-22 DIAGNOSIS — E66.09 CLASS 1 OBESITY DUE TO EXCESS CALORIES WITHOUT SERIOUS COMORBIDITY WITH BODY MASS INDEX (BMI) OF 34.0 TO 34.9 IN ADULT: ICD-10-CM

## 2025-05-22 DIAGNOSIS — Z00.00 ROUTINE HEALTH MAINTENANCE: ICD-10-CM

## 2025-05-22 DIAGNOSIS — E78.5 HYPERLIPIDEMIA ASSOCIATED WITH TYPE 2 DIABETES MELLITUS: Chronic | ICD-10-CM

## 2025-05-22 LAB
OHS QRS DURATION: 88 MS
OHS QTC CALCULATION: 427 MS

## 2025-05-22 PROCEDURE — 3074F SYST BP LT 130 MM HG: CPT | Mod: CPTII,S$GLB,, | Performed by: INTERNAL MEDICINE

## 2025-05-22 PROCEDURE — 1159F MED LIST DOCD IN RCRD: CPT | Mod: CPTII,S$GLB,, | Performed by: INTERNAL MEDICINE

## 2025-05-22 PROCEDURE — 93010 ELECTROCARDIOGRAM REPORT: CPT | Mod: ,,, | Performed by: INTERNAL MEDICINE

## 2025-05-22 PROCEDURE — 99999 PR PBB SHADOW E&M-EST. PATIENT-LVL IV: CPT | Mod: PBBFAC,,, | Performed by: INTERNAL MEDICINE

## 2025-05-22 PROCEDURE — 3072F LOW RISK FOR RETINOPATHY: CPT | Mod: CPTII,S$GLB,, | Performed by: INTERNAL MEDICINE

## 2025-05-22 PROCEDURE — 99204 OFFICE O/P NEW MOD 45 MIN: CPT | Mod: S$GLB,,, | Performed by: INTERNAL MEDICINE

## 2025-05-22 PROCEDURE — 3008F BODY MASS INDEX DOCD: CPT | Mod: CPTII,S$GLB,, | Performed by: INTERNAL MEDICINE

## 2025-05-22 PROCEDURE — 93005 ELECTROCARDIOGRAM TRACING: CPT

## 2025-05-22 PROCEDURE — 1160F RVW MEDS BY RX/DR IN RCRD: CPT | Mod: CPTII,S$GLB,, | Performed by: INTERNAL MEDICINE

## 2025-05-22 PROCEDURE — 3078F DIAST BP <80 MM HG: CPT | Mod: CPTII,S$GLB,, | Performed by: INTERNAL MEDICINE

## 2025-05-22 PROCEDURE — 3046F HEMOGLOBIN A1C LEVEL >9.0%: CPT | Mod: CPTII,S$GLB,, | Performed by: INTERNAL MEDICINE

## 2025-05-22 NOTE — PROGRESS NOTES
Subjective:   Patient ID:  Mary Patterson is a 65 y.o. female who presents for evaluation of No chief complaint on file.      64 yo female, referred for chest pain by Tanner Lobo  [facility] DM hypothyroidism  Chest pain when woke up. Lasted for hours, took ASA  Next sharp muscle on shoulders for hours again.   A1c 10.7    EKG reviewed by myself today reveals NSR   Exercise elliptic 15 min few times a week.  Could not tolerate GLP1A      History of Present Illness    CHIEF COMPLAINT:  - Mary presents for evaluation of recent chest tightness and neck pain episodes, referred by PCP due to concerns about possible cardiac issues.    HPI:  - Experienced significant chest tightness episode 1.5 months ago; awoke with tightness in right side of chest; persisted throughout the day despite taking aspirin  - Visited Dr. Santiago, who referred her to a cardiologist  - Following day, experienced significant bilateral pain from shoulder to side of neck, extending to base of skull; described as extremely sharp; thought she might have near-syncope  - Pain was significant enough to interrupt activity and require immediate return home; took two aspirin with minimal improvement; pain persisted for 3 days before fully resolving  - Implemented lifestyle changes since episodes; uses elliptical machine for exercise; lost 9 lbs  - Can now use elliptical for 10-12, approaching 15 minutes at a time  - Previous A1C was 10.7, prompting medication regimen change  - Switched from Metformin (causing frequent UTIs) to Jardiance and Januvia; reports improvement on new regimen; been on it for 2 months  - Had been on levothyroxine for many years, causing extreme pain in arms, muscles, and joints  - Doctor switched her to [place] Thyroid and recently increased the dose; reports significant improvement with resolution of arm pain  - Describes poor sleep patterns, waking up every 2-3 hours, usually to urinate  - Denies chest pain while using  elliptical machine and additional dyspnea    CARDIAC HISTORY:  - EKG 2025-05-22: NSR, good EKG  - EKG 2025-05-22: NSR, good EKG    MEDICATIONS:  - Aspirin 81 mg PRN for pain  - Jardiance for diabetes  - Januvia for diabetes  - Armor thyroid for thyroid condition, dose recently increased  - Discontinued Metformin for diabetes due to causing UTIs  - Discontinued Levothyroxine for thyroid condition due to extreme pain in upper extremities, muscles, and joints  - Discontinued Ozempic (injections in stomach) for diabetes due to significant side effects    TEST RESULTS:  - A1C: 1.5-2 months ago, 10.7    MEDICAL HISTORY:  - Diabetes  - Thyroid condition    FAMILY HISTORY:  - Father's side: diabetes        No results found for this or any previous visit.     No results found for this or any previous visit.       Past Medical History:   Diagnosis Date    Delayed immunizations 07/05/2022    Diabetes mellitus, type 2     Meralgia paresthetica 04/08/2021    Obesity     Plantar fasciitis 09/13/2017    Submandibular lymphadenopathy 03/22/2019    Throat burning 03/22/2019    Thyroid disease     hypothyroidism    Type II or unspecified type diabetes mellitus without mention of complication, not stated as uncontrolled        Past Surgical History:   Procedure Laterality Date    BLADDER SUSPENSION      CHOLECYSTECTOMY      HYSTERECTOMY      KNEE SURGERY Right     OOPHORECTOMY         Social History[1]    Family History   Problem Relation Name Age of Onset    Cataracts Father Oscar Gross     Glaucoma Father Oscar Gross     Obesity Father Oscar Gross     Diabetes Father Oscar Gross     Rheumatologic disease Sister      Obesity Sister      Glaucoma Paternal Aunt      Glaucoma Paternal Uncle      Diabetes Paternal Uncle      Obesity Paternal Grandfather Damon Gross     Diabetes Paternal Grandfather Damon Gross     Heart disease Neg Hx      Cancer Neg Hx         Review of Systems   Constitutional: Negative for decreased  appetite, diaphoresis, fever, malaise/fatigue and night sweats.   HENT:  Negative for nosebleeds.    Eyes:  Negative for blurred vision and double vision.   Cardiovascular:  Positive for chest pain. Negative for claudication, dyspnea on exertion, irregular heartbeat, leg swelling, near-syncope, orthopnea, palpitations, paroxysmal nocturnal dyspnea and syncope.   Respiratory:  Negative for cough, shortness of breath, sleep disturbances due to breathing, snoring, sputum production and wheezing.    Endocrine: Negative for cold intolerance and polyuria.   Hematologic/Lymphatic: Does not bruise/bleed easily.   Skin:  Negative for rash.   Musculoskeletal:  Negative for back pain, falls, joint pain, joint swelling and neck pain.   Gastrointestinal:  Negative for abdominal pain, heartburn, nausea and vomiting.   Genitourinary:  Negative for dysuria, frequency and hematuria.   Neurological:  Negative for difficulty with concentration, dizziness, focal weakness, headaches, light-headedness, numbness, seizures and weakness.   Psychiatric/Behavioral:  Negative for depression, memory loss and substance abuse. The patient does not have insomnia.    Allergic/Immunologic: Negative for HIV exposure and hives.       Objective:   Physical Exam  HENT:      Head: Normocephalic.   Eyes:      Pupils: Pupils are equal, round, and reactive to light.   Neck:      Thyroid: No thyromegaly.      Vascular: Normal carotid pulses. No carotid bruit or JVD.   Cardiovascular:      Rate and Rhythm: Normal rate and regular rhythm. No extrasystoles are present.     Chest Wall: PMI is not displaced.      Pulses: Normal pulses.      Heart sounds: Normal heart sounds. No murmur heard.     No gallop. No S3 sounds.   Pulmonary:      Effort: No respiratory distress.      Breath sounds: Normal breath sounds. No stridor.   Abdominal:      General: Bowel sounds are normal.      Palpations: Abdomen is soft.      Tenderness: There is no abdominal tenderness. There  "is no rebound.   Musculoskeletal:         General: Normal range of motion.   Skin:     Findings: No rash.   Neurological:      Mental Status: She is alert and oriented to person, place, and time.   Psychiatric:         Behavior: Behavior normal.         Lab Results   Component Value Date    CHOL 197 09/19/2024    CHOL 209 (H) 05/24/2024    CHOL 219 (H) 10/13/2023     Lab Results   Component Value Date    HDL 39 (L) 09/19/2024    HDL 40 05/24/2024    HDL 44 10/13/2023     Lab Results   Component Value Date    LDLCALC 124.6 09/19/2024    LDLCALC 135.2 05/24/2024    LDLCALC 143.8 10/13/2023     Lab Results   Component Value Date    TRIG 167 (H) 09/19/2024    TRIG 169 (H) 05/24/2024    TRIG 156 (H) 10/13/2023     Lab Results   Component Value Date    CHOLHDL 19.8 (L) 09/19/2024    CHOLHDL 19.1 (L) 05/24/2024    CHOLHDL 20.1 10/13/2023       Chemistry        Component Value Date/Time     09/19/2024 1023    K 4.4 09/19/2024 1023     09/19/2024 1023    CO2 22 (L) 09/19/2024 1023    BUN 14 09/19/2024 1023    CREATININE 1.3 09/19/2024 1023     (H) 09/19/2024 1023        Component Value Date/Time    CALCIUM 9.6 09/19/2024 1023    ALKPHOS 60 09/19/2024 1023    AST 23 09/19/2024 1023    ALT 25 09/19/2024 1023    BILITOT 0.6 09/19/2024 1023    ESTGFRAFRICA >60.0 06/30/2022 1006    EGFRNONAA >60.0 06/30/2022 1006          Lab Results   Component Value Date    HGBA1C 10.7 (H) 04/10/2025     Lab Results   Component Value Date    TSH 60.315 (H) 04/10/2025     No results found for: "INR", "PROTIME"  Lab Results   Component Value Date    WBC 7.32 04/29/2023    HGB 13.4 04/29/2023    HCT 41.1 04/29/2023    MCV 88 04/29/2023     04/29/2023     BNP  @LABRCNTIP(BNP,BNPTRIAGEBLO)@  CrCl cannot be calculated (Patient's most recent lab result is older than the maximum 7 days allowed.).  No results found in the last 24 hours.  No results found in the last 24 hours.  No results found in the last 24 " hours.    Assessment:      1. Chest pain, unspecified type    2. Severe obesity (BMI 35.0-39.9) with comorbidity    3. Hyperlipidemia associated with type 2 diabetes mellitus    4. Uncontrolled type 2 diabetes mellitus with hyperglycemia, without long-term current use of insulin    5. Class 1 obesity due to excess calories without serious comorbidity with body mass index (BMI) of 34.0 to 34.9 in adult        Plan:     Assessment & Plan    IMPRESSION:  - Sleep apnea potential cause of chest discomfort upon waking.  - Explained that lack of chest pain during elliptical exercise suggests symptoms are likely not cardiac-related.    CHEST PAIN / CARDIAC EVALUATION:  - Ordered stress test and echocardiogram to further evaluate and rule out cardiac issues.            ETT and echo for chest pain h/o DM   Carotid US screen    This note was generated with the assistance of ambient listening technology. Verbal consent was obtained by the patient and accompanying visitor(s) for the recording of patient appointment to facilitate this note. I attest to having reviewed and edited the generated note for accuracy, though some syntax or spelling errors may persist. Please contact the author of this note for any clarification.              [1]   Social History  Tobacco Use    Smoking status: Never     Passive exposure: Never    Smokeless tobacco: Never   Substance Use Topics    Alcohol use: No    Drug use: No

## 2025-05-28 DIAGNOSIS — E11.9 TYPE 2 DIABETES MELLITUS WITHOUT COMPLICATION: ICD-10-CM

## 2025-06-11 ENCOUNTER — HOSPITAL ENCOUNTER (OUTPATIENT)
Dept: CARDIOLOGY | Facility: HOSPITAL | Age: 65
Discharge: HOME | End: 2025-06-11
Attending: INTERNAL MEDICINE
Payer: COMMERCIAL

## 2025-06-11 VITALS
SYSTOLIC BLOOD PRESSURE: 151 MMHG | HEIGHT: 66 IN | WEIGHT: 209 LBS | BODY MASS INDEX: 33.59 KG/M2 | DIASTOLIC BLOOD PRESSURE: 70 MMHG

## 2025-06-11 VITALS
HEART RATE: 71 BPM | SYSTOLIC BLOOD PRESSURE: 151 MMHG | WEIGHT: 209 LBS | HEIGHT: 66 IN | BODY MASS INDEX: 33.59 KG/M2 | DIASTOLIC BLOOD PRESSURE: 70 MMHG

## 2025-06-11 DIAGNOSIS — R07.9 CHEST PAIN, UNSPECIFIED TYPE: ICD-10-CM

## 2025-06-11 DIAGNOSIS — E11.69 HYPERLIPIDEMIA ASSOCIATED WITH TYPE 2 DIABETES MELLITUS: Chronic | ICD-10-CM

## 2025-06-11 DIAGNOSIS — E78.5 HYPERLIPIDEMIA ASSOCIATED WITH TYPE 2 DIABETES MELLITUS: Chronic | ICD-10-CM

## 2025-06-11 LAB
AORTIC ROOT ANNULUS: 2.9 CM
AORTIC SIZE INDEX (SOV): 1.3 CM/M2
AORTIC SIZE INDEX: 1.2 CM/M2
ASCENDING AORTA: 2.5 CM
AV INDEX (PROSTH): 0.77
AV MEAN GRADIENT: 5 MMHG
AV PEAK GRADIENT: 9 MMHG
AV VALVE AREA BY VELOCITY RATIO: 2.3 CM²
AV VALVE AREA: 2.4 CM²
AV VELOCITY RATIO: 0.73
BSA FOR ECHO PROCEDURE: 2.09 M2
CV ECHO LV RWT: 0.44 CM
CV STRESS BASE HR: 67 BPM
DIASTOLIC BLOOD PRESSURE: 74 MMHG
DOP CALC AO PEAK VEL: 1.5 M/S
DOP CALC AO VTI: 37.9 CM
DOP CALC LVOT AREA: 3.1 CM2
DOP CALC LVOT DIAMETER: 2 CM
DOP CALC LVOT PEAK VEL: 1.1 M/S
DOP CALC LVOT STROKE VOLUME: 91.7 CM3
DOP CALC RVOT PEAK VEL: 0.89 M/S
DOP CALC RVOT VTI: 21.4 CM
DOP CALCLVOT PEAK VEL VTI: 29.2 CM
E WAVE DECELERATION TIME: 197 MSEC
E/A RATIO: 0.6
E/E' RATIO: 7 M/S
ECHO LV POSTERIOR WALL: 0.9 CM (ref 0.6–1.1)
EJECTION FRACTION: 60 %
FRACTIONAL SHORTENING: 29.3 % (ref 28–44)
INTERVENTRICULAR SEPTUM: 0.9 CM (ref 0.6–1.1)
IVC DIAMETER: 1.09 CM
IVRT: 97 MSEC
LA MAJOR: 5.5 CM
LA MINOR: 5.5 CM
LA WIDTH: 3.3 CM
LEFT ARM DIASTOLIC BLOOD PRESSURE: 70 MMHG
LEFT ARM SYSTOLIC BLOOD PRESSURE: 151 MMHG
LEFT ATRIUM AREA SYSTOLIC (APICAL 2 CHAMBER): 19.89 CM2
LEFT ATRIUM AREA SYSTOLIC (APICAL 4 CHAMBER): 16.22 CM2
LEFT ATRIUM SIZE: 4 CM
LEFT ATRIUM VOLUME INDEX MOD: 22 ML/M2
LEFT ATRIUM VOLUME INDEX: 30 ML/M2
LEFT ATRIUM VOLUME MOD: 45 ML
LEFT ATRIUM VOLUME: 62 CM3
LEFT CBA DIAS: 10 CM/S
LEFT CBA SYS: 60 CM/S
LEFT CCA DIST DIAS: 10 CM/S
LEFT CCA DIST SYS: 85 CM/S
LEFT CCA MID DIAS: 14 CM/S
LEFT CCA MID SYS: 86 CM/S
LEFT CCA PROX DIAS: 17 CM/S
LEFT CCA PROX SYS: 126 CM/S
LEFT ECA DIAS: 8 CM/S
LEFT ECA SYS: 92 CM/S
LEFT ICA DIST DIAS: 29 CM/S
LEFT ICA DIST SYS: 113 CM/S
LEFT ICA MID DIAS: 24 CM/S
LEFT ICA MID SYS: 96 CM/S
LEFT ICA PROX DIAS: 16 CM/S
LEFT ICA PROX SYS: 90 CM/S
LEFT INTERNAL DIMENSION IN SYSTOLE: 2.9 CM (ref 2.1–4)
LEFT VENTRICLE DIASTOLIC VOLUME INDEX: 36.95 ML/M2
LEFT VENTRICLE DIASTOLIC VOLUME: 75 ML
LEFT VENTRICLE END SYSTOLIC VOLUME APICAL 2 CHAMBER: 57.47 ML
LEFT VENTRICLE END SYSTOLIC VOLUME APICAL 4 CHAMBER: 35.29 ML
LEFT VENTRICLE MASS INDEX: 56.2 G/M2
LEFT VENTRICLE SYSTOLIC VOLUME INDEX: 15.8 ML/M2
LEFT VENTRICLE SYSTOLIC VOLUME: 32 ML
LEFT VENTRICULAR INTERNAL DIMENSION IN DIASTOLE: 4.1 CM (ref 3.5–6)
LEFT VENTRICULAR MASS: 114.1 G
LEFT VERTEBRAL DIAS: 17 CM/S
LEFT VERTEBRAL SYS: 72 CM/S
LV LATERAL E/E' RATIO: 5.8 M/S
LV SEPTAL E/E' RATIO: 10 M/S
LVED V (TEICH): 74.59 ML
LVES V (TEICH): 32.33 ML
LVOT MG: 2.64 MMHG
LVOT MV: 0.77 CM/S
MV PEAK A VEL: 1.17 M/S
MV PEAK E VEL: 0.7 M/S
MV STENOSIS PRESSURE HALF TIME: 57.27 MS
MV VALVE AREA P 1/2 METHOD: 3.84 CM2
OHS CV CAROTID RIGHT ICA EDV HIGHEST: 26
OHS CV CAROTID ULTRASOUND LEFT ICA/CCA RATIO: 1.33
OHS CV CAROTID ULTRASOUND RIGHT ICA/CCA RATIO: 0.88
OHS CV CPX 85 PERCENT MAX PREDICTED HEART RATE MALE: 133
OHS CV CPX ESTIMATED METS: 6
OHS CV CPX MAX PREDICTED HEART RATE: 156
OHS CV CPX PATIENT IS FEMALE: 1
OHS CV CPX PATIENT IS MALE: 0
OHS CV CPX PEAK DIASTOLIC BLOOD PRESSURE: 77 MMHG
OHS CV CPX PEAK HEAR RATE: 136 BPM
OHS CV CPX PEAK RATE PRESSURE PRODUCT: NORMAL
OHS CV CPX PEAK SYSTOLIC BLOOD PRESSURE: 224 MMHG
OHS CV CPX PERCENT MAX PREDICTED HEART RATE ACHIEVED: 91
OHS CV CPX RATE PRESSURE PRODUCT PRESENTING: 9715
OHS CV PV CAROTID LEFT HIGHEST CCA: 126
OHS CV PV CAROTID LEFT HIGHEST ICA: 113
OHS CV PV CAROTID RIGHT HIGHEST CCA: 103
OHS CV PV CAROTID RIGHT HIGHEST ICA: 73
OHS CV RV/LV RATIO: 0.59 CM
OHS CV US CAROTID LEFT HIGHEST EDV: 29
PISA TR MAX VEL: 2.2 M/S
PULM VEIN S/D RATIO: 1.44
PV MEAN GRADIENT: 2 MMHG
PV PEAK D VEL: 0.43 M/S
PV PEAK GRADIENT: 3 MMHG
PV PEAK S VEL: 0.62 M/S
PV PEAK VELOCITY: 0.88 M/S
RA MAJOR: 4.32 CM
RA PRESSURE ESTIMATED: 3 MMHG
RA WIDTH: 2.27 CM
RIGHT ARM DIASTOLIC BLOOD PRESSURE: 67 MMHG
RIGHT ARM SYSTOLIC BLOOD PRESSURE: 142 MMHG
RIGHT CBA DIAS: 17 CM/S
RIGHT CBA SYS: 73 CM/S
RIGHT CCA DIST DIAS: 18 CM/S
RIGHT CCA DIST SYS: 83 CM/S
RIGHT CCA MID DIAS: 16 CM/S
RIGHT CCA MID SYS: 103 CM/S
RIGHT CCA PROX DIAS: 9 CM/S
RIGHT CCA PROX SYS: 71 CM/S
RIGHT ECA DIAS: 7 CM/S
RIGHT ECA SYS: 92 CM/S
RIGHT ICA DIST DIAS: 26 CM/S
RIGHT ICA DIST SYS: 72 CM/S
RIGHT ICA MID DIAS: 23 CM/S
RIGHT ICA MID SYS: 71 CM/S
RIGHT ICA PROX DIAS: 16 CM/S
RIGHT ICA PROX SYS: 73 CM/S
RIGHT VENTRICLE DIASTOLIC BASEL DIMENSION: 2.4 CM
RIGHT VENTRICULAR END-DIASTOLIC DIMENSION: 2.43 CM
RIGHT VERTEBRAL DIAS: 6 CM/S
RIGHT VERTEBRAL SYS: 38 CM/S
RV TB RVSP: 5 MMHG
SINUS: 2.65 CM
STJ: 2.5 CM
STRESS ECHO POST EXERCISE DUR MIN: 4 MINUTES
STRESS ECHO POST EXERCISE DUR SEC: 15 SECONDS
SYSTOLIC BLOOD PRESSURE: 145 MMHG
TDI LATERAL: 0.12 M/S
TDI SEPTAL: 0.07 M/S
TDI: 0.1 M/S
TR MAX PG: 20 MMHG
TRICUSPID ANNULAR PLANE SYSTOLIC EXCURSION: 2.3 CM
TV REST PULMONARY ARTERY PRESSURE: 22 MMHG
Z-SCORE OF LEFT VENTRICULAR DIMENSION IN END DIASTOLE: -3.83
Z-SCORE OF LEFT VENTRICULAR DIMENSION IN END SYSTOLE: -1.9

## 2025-06-11 PROCEDURE — 93306 TTE W/DOPPLER COMPLETE: CPT | Mod: 26,,, | Performed by: INTERNAL MEDICINE

## 2025-06-11 PROCEDURE — 93017 CV STRESS TEST TRACING ONLY: CPT

## 2025-06-11 PROCEDURE — 93306 TTE W/DOPPLER COMPLETE: CPT

## 2025-06-11 PROCEDURE — 93880 EXTRACRANIAL BILAT STUDY: CPT

## 2025-06-11 PROCEDURE — 93016 CV STRESS TEST SUPVJ ONLY: CPT | Mod: ,,, | Performed by: INTERNAL MEDICINE

## 2025-06-11 PROCEDURE — 93880 EXTRACRANIAL BILAT STUDY: CPT | Mod: 26,,, | Performed by: INTERNAL MEDICINE

## 2025-06-11 PROCEDURE — 93018 CV STRESS TEST I&R ONLY: CPT | Mod: ,,, | Performed by: INTERNAL MEDICINE

## 2025-06-12 ENCOUNTER — RESULTS FOLLOW-UP (OUTPATIENT)
Dept: CARDIOLOGY | Facility: CLINIC | Age: 65
End: 2025-06-12

## 2025-06-13 ENCOUNTER — TELEPHONE (OUTPATIENT)
Dept: CARDIOLOGY | Facility: CLINIC | Age: 65
End: 2025-06-13
Payer: COMMERCIAL

## 2025-06-13 NOTE — TELEPHONE ENCOUNTER
LVM for pt to call back in regards to test results.                 ----- Message from Dominik Ramos MD sent at 6/12/2025  9:32 PM CDT -----  The Echo showed normal function   The treadmill stress EKG test is normal. No ischemia.  Continue current Rx.   RTC in 1yr with SLOANE        ----- Message -----  From: Carlo Suazo MD  Sent: 6/11/2025   6:32 PM CDT  To: Dominik Ramos MD    
LVM for pt to call back in regards to test results.         ----- Message from Dominik Ramos MD sent at 6/12/2025  9:33 PM CDT -----  Carotid artery US showed mild Dz  Continue current Rx.   F/U as scheduled    ----- Message -----  From: Carlo Suazo MD  Sent: 6/11/2025   3:43 PM CDT  To: Dominik Ramos MD    
None

## 2025-06-16 ENCOUNTER — TELEPHONE (OUTPATIENT)
Dept: CARDIOLOGY | Facility: CLINIC | Age: 65
End: 2025-06-16
Payer: COMMERCIAL

## 2025-06-16 NOTE — TELEPHONE ENCOUNTER
Spoke with pt in regards to test results. Pt verbalized understanding information without any concerns.           Copied from CRM #[id number]. Topic: General Inquiry - Return Call  >> Jun 16, 2025  1:26 PM Rosalie wrote:  Type:  Patient Returning Call    Who Called:Mary Patterson   Who Left Message for Patient:Trinh Salas MA  Does the patient know what this is regarding?:test results  Would the patient rather a call back or a response via [employer]? Call back  Best Call Back Number:[phone]   Thanks!

## 2025-07-02 ENCOUNTER — TELEPHONE (OUTPATIENT)
Dept: INTERNAL MEDICINE | Facility: CLINIC | Age: 65
End: 2025-07-02
Payer: COMMERCIAL

## 2025-07-02 NOTE — TELEPHONE ENCOUNTER
Copied from CRM #[id number]. Topic: Appointments - Amb Referral  >> Jul 2, 2025 11:18 AM Summer wrote:  Caller is requesting to schedule their Lab appointment prior to annual appointment.  Order is not listed in EPIC.  Please enter order and contact patient to schedule.    Name of Caller: Mary Briceno Date and Time of Labs: week before appt     Date of EPP Appointment: 7/15    Where would they like the lab performed? [facility]    Would the patient rather a call back or a response via [facility]? callback    Best Call Back Number:[phone]    Additional Information:

## 2025-07-07 ENCOUNTER — PATIENT MESSAGE (OUTPATIENT)
Dept: INTERNAL MEDICINE | Facility: CLINIC | Age: 65
End: 2025-07-07
Payer: COMMERCIAL

## 2025-07-08 DIAGNOSIS — T38.3X5S ADVERSE EFFECT OF METFORMIN, SEQUELA: ICD-10-CM

## 2025-07-08 DIAGNOSIS — E11.65 UNCONTROLLED TYPE 2 DIABETES MELLITUS WITH HYPERGLYCEMIA, WITHOUT LONG-TERM CURRENT USE OF INSULIN: ICD-10-CM

## 2025-07-08 DIAGNOSIS — N18.31 CHRONIC KIDNEY DISEASE, STAGE 3A: Chronic | ICD-10-CM

## 2025-07-08 DIAGNOSIS — E03.9 ACQUIRED HYPOTHYROIDISM: ICD-10-CM

## 2025-07-08 RX ORDER — EMPAGLIFLOZIN 25 MG/1
25 TABLET, FILM COATED ORAL
Qty: 90 TABLET | Refills: 1 | Status: SHIPPED | OUTPATIENT
Start: 2025-07-08

## 2025-07-08 RX ORDER — THYROID, PORCINE 120 MG/1
120 TABLET ORAL
Qty: 90 TABLET | Refills: 3 | Status: SHIPPED | OUTPATIENT
Start: 2025-07-08

## 2025-07-08 NOTE — TELEPHONE ENCOUNTER
Refill Routing Note   Medication(s) are not appropriate for processing by [facility] for the following reason(s):        New or recently adjusted medication    [facility] action(s):  Defer     Requires labs : Yes             Appointments  past 12m or future 3m with PCP    Date Provider   Last Visit   4/15/2025 Gustabo Santiago MD   Next Visit   7/15/2025 Gustabo Santiago MD   ED visits in past 90 days: 0        Note composed:1:03 PM 07/08/2025

## 2025-07-08 NOTE — TELEPHONE ENCOUNTER
Care Due:                  Date            Visit Type   Department     Provider  --------------------------------------------------------------------------------                                EP -                              PRIMARY      [facility] INTERNAL  Last Visit: 04-      CARE ([facility])   AKHIL Santiago                              EP -                              PRIMARY      [facility] INTERNAL  Next Visit: 07-      CARE ([facility])   MEDICINE       Gustabo Santiago                                                            Last  Test          Frequency    Reason                     Performed    Due Date  --------------------------------------------------------------------------------    CMP.........  12 months..  empagliflozin............  09-   09-    [employer] Embedded Care Due Messages. Reference number: [id number].   7/08/2025 6:00:58 AM CDT

## 2025-07-10 ENCOUNTER — LAB VISIT (OUTPATIENT)
Dept: LAB | Facility: HOSPITAL | Age: 65
End: 2025-07-10
Attending: FAMILY MEDICINE
Payer: COMMERCIAL

## 2025-07-10 DIAGNOSIS — E11.65 UNCONTROLLED TYPE 2 DIABETES MELLITUS WITH HYPERGLYCEMIA, WITHOUT LONG-TERM CURRENT USE OF INSULIN: ICD-10-CM

## 2025-07-10 DIAGNOSIS — N18.31 CHRONIC KIDNEY DISEASE, STAGE 3A: Chronic | ICD-10-CM

## 2025-07-10 DIAGNOSIS — E03.9 ACQUIRED HYPOTHYROIDISM: ICD-10-CM

## 2025-07-10 DIAGNOSIS — E11.9 TYPE 2 DIABETES MELLITUS WITHOUT COMPLICATION: ICD-10-CM

## 2025-07-10 LAB
ALBUMIN SERPL BCP-MCNC: 4.2 G/DL (ref 3.5–5.2)
ALBUMIN/CREAT UR: NORMAL
ANION GAP (OHS): 12 MMOL/L (ref 8–16)
BUN SERPL-MCNC: 14 MG/DL (ref 8–23)
CALCIUM SERPL-MCNC: 9.3 MG/DL (ref 8.7–10.5)
CHLORIDE SERPL-SCNC: 106 MMOL/L (ref 95–110)
CO2 SERPL-SCNC: 21 MMOL/L (ref 23–29)
CREAT SERPL-MCNC: 0.9 MG/DL (ref 0.5–1.4)
CREAT UR-MCNC: 56 MG/DL (ref 15–325)
EAG (OHS): 169 MG/DL (ref 68–131)
GFR SERPLBLD CREATININE-BSD FMLA CKD-EPI: >60 ML/MIN/1.73/M2
GLUCOSE SERPL-MCNC: 118 MG/DL (ref 70–110)
HBA1C MFR BLD: 7.5 % (ref 4–5.6)
MICROALBUMIN UR-MCNC: <5 UG/ML (ref ?–5000)
PHOSPHATE SERPL-MCNC: 2.8 MG/DL (ref 2.7–4.5)
POTASSIUM SERPL-SCNC: 4.5 MMOL/L (ref 3.5–5.1)
SODIUM SERPL-SCNC: 139 MMOL/L (ref 136–145)
T4 FREE SERPL-MCNC: 0.76 NG/DL (ref 0.71–1.51)
TSH SERPL-ACNC: 8.5 UIU/ML (ref 0.4–4)

## 2025-07-10 PROCEDURE — 83036 HEMOGLOBIN GLYCOSYLATED A1C: CPT

## 2025-07-10 PROCEDURE — 80069 RENAL FUNCTION PANEL: CPT

## 2025-07-10 PROCEDURE — 84439 ASSAY OF FREE THYROXINE: CPT

## 2025-07-10 PROCEDURE — 84443 ASSAY THYROID STIM HORMONE: CPT

## 2025-07-10 PROCEDURE — 36415 COLL VENOUS BLD VENIPUNCTURE: CPT | Mod: PO

## 2025-07-10 PROCEDURE — 82570 ASSAY OF URINE CREATININE: CPT

## 2025-07-15 DIAGNOSIS — T38.3X5S ADVERSE EFFECT OF METFORMIN, SEQUELA: ICD-10-CM

## 2025-07-15 DIAGNOSIS — N18.31 CHRONIC KIDNEY DISEASE, STAGE 3A: Chronic | ICD-10-CM

## 2025-07-15 DIAGNOSIS — E11.65 UNCONTROLLED TYPE 2 DIABETES MELLITUS WITH HYPERGLYCEMIA, WITHOUT LONG-TERM CURRENT USE OF INSULIN: ICD-10-CM

## 2025-07-15 NOTE — TELEPHONE ENCOUNTER
Copied from CRM #[id number]. Topic: Medications - Medication Refill  >> Jul 15, 2025  9:37 AM Med Assistant Rufina wrote:  Type:  RX Refill Request    Who Called: Rupinder  Refill or New Rx: Refill  RX Name and Strength: Januvia 100mg  How is the patient currently taking it? (ex. 1XDay): 1x  Is this a 30 day or 90 day RX: 90 with additional refills  Preferred Pharmacy with phone number:   [id number] - [place] - [address]  [address]  [place] [address]  Phone: [phone] Fax: [phone]  Local or Mail Order: Local  Ordering Provider: Dr. Santiago  Would the patient rather a call back or a response via [facility]?  Call  Best Call Back Number: [phone]  Additional Information:

## 2025-07-15 NOTE — TELEPHONE ENCOUNTER
No care due was identified.  [facility] Embedded Care Due Messages. Reference number: [id number].   7/15/2025 9:58:48 AM CDT